# Patient Record
Sex: FEMALE | Race: BLACK OR AFRICAN AMERICAN | NOT HISPANIC OR LATINO | URBAN - METROPOLITAN AREA
[De-identification: names, ages, dates, MRNs, and addresses within clinical notes are randomized per-mention and may not be internally consistent; named-entity substitution may affect disease eponyms.]

---

## 2017-09-18 ENCOUNTER — EMERGENCY (EMERGENCY)
Facility: HOSPITAL | Age: 32
LOS: 1 days | Discharge: HOME | End: 2017-09-18
Attending: EMERGENCY MEDICINE | Admitting: EMERGENCY MEDICINE
Payer: COMMERCIAL

## 2017-09-18 VITALS
DIASTOLIC BLOOD PRESSURE: 82 MMHG | WEIGHT: 168.21 LBS | SYSTOLIC BLOOD PRESSURE: 131 MMHG | OXYGEN SATURATION: 98 % | HEART RATE: 99 BPM | TEMPERATURE: 98 F | RESPIRATION RATE: 18 BRPM

## 2017-09-18 VITALS
SYSTOLIC BLOOD PRESSURE: 113 MMHG | OXYGEN SATURATION: 100 % | RESPIRATION RATE: 18 BRPM | HEART RATE: 80 BPM | TEMPERATURE: 98 F | DIASTOLIC BLOOD PRESSURE: 77 MMHG

## 2017-09-18 DIAGNOSIS — J45.909 UNSPECIFIED ASTHMA, UNCOMPLICATED: ICD-10-CM

## 2017-09-18 DIAGNOSIS — R07.89 OTHER CHEST PAIN: ICD-10-CM

## 2017-09-18 PROCEDURE — 93005 ELECTROCARDIOGRAM TRACING: CPT

## 2017-09-18 PROCEDURE — 94640 AIRWAY INHALATION TREATMENT: CPT

## 2017-09-18 PROCEDURE — 71020: CPT | Mod: 26

## 2017-09-18 PROCEDURE — 99285 EMERGENCY DEPT VISIT HI MDM: CPT | Mod: 25

## 2017-09-18 PROCEDURE — 99284 EMERGENCY DEPT VISIT MOD MDM: CPT | Mod: 25

## 2017-09-18 PROCEDURE — 93010 ELECTROCARDIOGRAM REPORT: CPT

## 2017-09-18 PROCEDURE — 71046 X-RAY EXAM CHEST 2 VIEWS: CPT

## 2017-09-18 RX ORDER — IBUPROFEN 200 MG
600 TABLET ORAL ONCE
Qty: 0 | Refills: 0 | Status: COMPLETED | OUTPATIENT
Start: 2017-09-18 | End: 2017-09-18

## 2017-09-18 RX ORDER — IPRATROPIUM/ALBUTEROL SULFATE 18-103MCG
3 AEROSOL WITH ADAPTER (GRAM) INHALATION ONCE
Qty: 0 | Refills: 0 | Status: COMPLETED | OUTPATIENT
Start: 2017-09-18 | End: 2017-09-18

## 2017-09-18 RX ADMIN — Medication 3 MILLILITER(S): at 18:49

## 2017-09-18 RX ADMIN — Medication 600 MILLIGRAM(S): at 18:50

## 2017-09-18 RX ADMIN — Medication 3 MILLILITER(S): at 17:58

## 2017-09-18 NOTE — ED ADULT TRIAGE NOTE - CHIEF COMPLAINT QUOTE
c/o left sided chest discomfort radiating to back accompanied with sob x 3 weeks. Pt reports had cold like symptoms with productive cough with clear sputum. [facility] Asthma.

## 2017-09-18 NOTE — ED ADULT NURSE NOTE - FALLEN IN THE PAST
For vomiting, clear fluids.  Take small sips often.  Don't intake too much at one time.  When tolerating clears, advance to bland diet.  BRAT:bananas, rice, applesauce, toast.  When tolerating bland, advance slowly to regular diet.  Try to avoid milk products for a few days.  Lactose free milk if needed.  Avoid greasy and spicy foods.  May develop malabsorptive stools.  Ok to eat with diarrhea.  Just watch spice, grease, and milk.  Call if symptoms persists.  Take any meds (zofran, phenergan) if prescribed if needed    
no

## 2017-09-18 NOTE — ED PROVIDER NOTE - OBJECTIVE STATEMENT
31 yo female with PMH of asthma presents with left chest pain and shortness of breath for the past 3 weeks. Pt states that she recently had a cold. Pt states that the chest pain became worse today so she came into the ED. In addition, she states that she feels lumps in her breasts. Pt denies n/v/f.

## 2017-09-18 NOTE — ED ADULT NURSE NOTE - CHPI ED SYMPTOMS NEG
no chills/no edema/no wheezing/no cough/no shortness of breath/no fever/no headache/no hemoptysis/no diaphoresis

## 2017-09-18 NOTE — ED ADULT NURSE NOTE - OBJECTIVE STATEMENT
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  No use of inhaler prior to arrival.  Provider to evaluate.

## 2017-09-18 NOTE — ED PROVIDER NOTE - MEDICAL DECISION MAKING DETAILS
Vitals Stable. EKG normal. CXR normal. Wheezes heard on exam. Nebulizer treatment given. No risk factors for PE. Pt is safe for d/c

## 2017-09-18 NOTE — ED PROVIDER NOTE - ATTENDING CONTRIBUTION TO CARE
33yo F here w/ CP and SOB x3 weeks. started w/ uri type symptoms, but those have since resolved. Never had CP w/ it before. Hx of asthma, last use of neb a few days ago. +wheezing on exam. will check ekg, cxr, and try duoneb, and reassess. if ekg nl, do not think needs labs. 33yo F here w/ CP and SOB x3 weeks. started w/ uri type symptoms, but those have since resolved. Never had CP w/ it before. Hx of asthma, last use of neb a few days ago. +mild wheezing on exam. will check ekg, cxr, and try duoneb, and reassess. if ekg nl, do not think needs labs. no PE risk factors, is PERC neg.

## 2022-04-08 ENCOUNTER — OFFICE VISIT (OUTPATIENT)
Dept: OBSTETRICS AND GYNECOLOGY | Facility: CLINIC | Age: 37
End: 2022-04-08
Payer: COMMERCIAL

## 2022-04-08 VITALS — WEIGHT: 167.56 LBS | DIASTOLIC BLOOD PRESSURE: 76 MMHG | SYSTOLIC BLOOD PRESSURE: 104 MMHG

## 2022-04-08 DIAGNOSIS — Z12.4 ENCOUNTER FOR PAPANICOLAOU SMEAR FOR CERVICAL CANCER SCREENING: ICD-10-CM

## 2022-04-08 DIAGNOSIS — L29.2 VULVAR ITCHING: ICD-10-CM

## 2022-04-08 DIAGNOSIS — R35.0 URINARY FREQUENCY: ICD-10-CM

## 2022-04-08 DIAGNOSIS — Z01.419 WELL WOMAN EXAM WITH ROUTINE GYNECOLOGICAL EXAM: Primary | ICD-10-CM

## 2022-04-08 PROCEDURE — 99385 PREV VISIT NEW AGE 18-39: CPT | Mod: S$GLB,,, | Performed by: OBSTETRICS & GYNECOLOGY

## 2022-04-08 PROCEDURE — 87624 HPV HI-RISK TYP POOLED RSLT: CPT | Performed by: OBSTETRICS & GYNECOLOGY

## 2022-04-08 PROCEDURE — 87481 CANDIDA DNA AMP PROBE: CPT | Mod: 59 | Performed by: OBSTETRICS & GYNECOLOGY

## 2022-04-08 PROCEDURE — 87086 URINE CULTURE/COLONY COUNT: CPT | Performed by: OBSTETRICS & GYNECOLOGY

## 2022-04-08 PROCEDURE — 3074F SYST BP LT 130 MM HG: CPT | Mod: CPTII,S$GLB,, | Performed by: OBSTETRICS & GYNECOLOGY

## 2022-04-08 PROCEDURE — 3078F PR MOST RECENT DIASTOLIC BLOOD PRESSURE < 80 MM HG: ICD-10-PCS | Mod: CPTII,S$GLB,, | Performed by: OBSTETRICS & GYNECOLOGY

## 2022-04-08 PROCEDURE — 3074F PR MOST RECENT SYSTOLIC BLOOD PRESSURE < 130 MM HG: ICD-10-PCS | Mod: CPTII,S$GLB,, | Performed by: OBSTETRICS & GYNECOLOGY

## 2022-04-08 PROCEDURE — 99999 PR PBB SHADOW E&M-NEW PATIENT-LVL III: CPT | Mod: PBBFAC,,, | Performed by: OBSTETRICS & GYNECOLOGY

## 2022-04-08 PROCEDURE — 88175 CYTOPATH C/V AUTO FLUID REDO: CPT | Performed by: OBSTETRICS & GYNECOLOGY

## 2022-04-08 PROCEDURE — 1159F MED LIST DOCD IN RCRD: CPT | Mod: CPTII,S$GLB,, | Performed by: OBSTETRICS & GYNECOLOGY

## 2022-04-08 PROCEDURE — 99385 PR PREVENTIVE VISIT,NEW,18-39: ICD-10-PCS | Mod: S$GLB,,, | Performed by: OBSTETRICS & GYNECOLOGY

## 2022-04-08 PROCEDURE — 1159F PR MEDICATION LIST DOCUMENTED IN MEDICAL RECORD: ICD-10-PCS | Mod: CPTII,S$GLB,, | Performed by: OBSTETRICS & GYNECOLOGY

## 2022-04-08 PROCEDURE — 3078F DIAST BP <80 MM HG: CPT | Mod: CPTII,S$GLB,, | Performed by: OBSTETRICS & GYNECOLOGY

## 2022-04-08 PROCEDURE — 99999 PR PBB SHADOW E&M-NEW PATIENT-LVL III: ICD-10-PCS | Mod: PBBFAC,,, | Performed by: OBSTETRICS & GYNECOLOGY

## 2022-04-08 RX ORDER — NITROFURANTOIN 25; 75 MG/1; MG/1
100 CAPSULE ORAL 2 TIMES DAILY
Qty: 14 CAPSULE | Refills: 0 | Status: SHIPPED | OUTPATIENT
Start: 2022-04-08 | End: 2022-04-15

## 2022-04-08 NOTE — PROGRESS NOTES
SUBJECTIVE:   Daniela Dillon is a 36 y.o. female No obstetric history on file.  for annual well woman exam. Patient's last menstrual period was 2022..  She reported menses have always been monthly and lasting 7-10 days, this is normal for her  However in Feb she did not get a cycle, period resumed in 3/31/22  H/o endometriosis and was on ocp in the past    Also having urinary frequency/urgency, incomplete bladder emptying x 1 week.  + dysuria as well  Not sure if she has yeast infection or UTI   gets yeast infection frequently, she denies vaginal discharge    Contraception: pullout method      No past medical history on file.  Past Surgical History:   Procedure Laterality Date    DILATION AND CURETTAGE OF UTERUS       for polyp     Social History     Socioeconomic History    Marital status:    Tobacco Use    Smoking status: Never Smoker    Smokeless tobacco: Never Used   Substance and Sexual Activity    Alcohol use: Not Currently    Drug use: Never     No family history on file.  OB History    Para Term  AB Living   2         2   SAB IAB Ectopic Multiple Live Births           2      # Outcome Date GA Lbr Simon/2nd Weight Sex Delivery Anes PTL Lv   2             1                Obstetric Comments   Gynhx: reg/7-10 days (heavy)    endometriosis   H/o hysteroscopy D&C ( ? Polyp was found) in    Denies abnl pap   Denies std         No current outpatient medications on file.     No current facility-administered medications for this visit.     Allergies: Patient has no known allergies.       ROS:  GENERAL: Denies weight gain or weight loss. Feeling well overall.   SKIN: Denies rash or lesions.   HEAD: Denies head injury or headache.   NODES: Denies enlarged lymph nodes.   CHEST: Denies chest pain or shortness of breath.   CARDIOVASCULAR: Denies palpitations or left sided chest pain.   ABDOMEN: No abdominal pain, constipation, diarrhea, nausea, vomiting or rectal  bleeding.   URINARY: No frequency, dysuria, hematuria, or burning on urination.  REPRODUCTIVE: Denies vaginal discharge, abnormal vaginal bleeding, lesions, pelvic pain  BREASTS: The patient performs breast self-examination and denies pain, lumps, or nipple discharge.   HEMATOLOGIC: No easy bruisability or excessive bleeding.  MUSCULOSKELETAL: Denies joint pain or swelling.   NEUROLOGIC: Denies syncope or weakness.   PSYCHIATRIC: Denies depression, anxiety or mood swings.      OBJECTIVE:   /76   Wt 76 kg (167 lb 8.8 oz)   LMP 03/31/2022 Comment: no feb period  The patient appears well, alert, oriented x 3, in no distress.  PSYCH:  Normal, full range of affect  NECK: negative, no thyromegaly, trachea midline  SKIN: normal, good color, good turgor and no acne, striae, hirsutism  BREAST EXAM: breasts appear normal, no suspicious masses, no skin or nipple changes or axillary nodes  ABDOMEN: soft, non-tender; bowel sounds normal; no masses,  no organomegaly and no hernias, masses, or hepatosplenomegaly  GENITALIA: normal external genitalia, no erythema, no discharge  BLADDER: soft  URETHRA: normal appearing urethra with no masses, tenderness or lesions and normal urethra, normal urethral meatus  VAGINA: Normal  CERVIX: no lesions or cervical motion tenderness  UTERUS: normal size, contour, position, consistency, mobility, non-tender  ADNEXA: no mass, fullness, tenderness      ASSESSMENT:   1. Health maintenance  -pap done. Discussed ASCCP guideline screening every 3 - 5 years.   -counseled on exercise and healthy diet, weight loss  -bone health:  Discussed Vitamin D and Calcium supplementation, weight bearing exercises  2.  Discussed safety at home/school/work, healthy and balanced diet, sleep hygiene, as well as violence/weapons exposure.   3.  Monitor period.  4.  Contraception:  Pt declined, elected to continue with current method  5.  Affirm done, likely UTI - empiric treatment with macrobid, urine cx sent

## 2022-04-10 LAB — BACTERIA UR CULT: NORMAL

## 2022-04-13 LAB
BACTERIAL VAGINOSIS DNA: NEGATIVE
CANDIDA GLABRATA DNA: NEGATIVE
CANDIDA KRUSEI DNA: NEGATIVE
CANDIDA RRNA VAG QL PROBE: NEGATIVE
T VAGINALIS RRNA GENITAL QL PROBE: NEGATIVE

## 2022-04-14 LAB
FINAL PATHOLOGIC DIAGNOSIS: NORMAL
HPV HR 12 DNA SPEC QL NAA+PROBE: NEGATIVE
HPV16 AG SPEC QL: NEGATIVE
HPV18 DNA SPEC QL NAA+PROBE: NEGATIVE
Lab: NORMAL

## 2023-02-23 ENCOUNTER — OFFICE VISIT (OUTPATIENT)
Dept: OBSTETRICS AND GYNECOLOGY | Facility: CLINIC | Age: 38
End: 2023-02-23
Payer: COMMERCIAL

## 2023-02-23 ENCOUNTER — TELEPHONE (OUTPATIENT)
Dept: OBSTETRICS AND GYNECOLOGY | Facility: CLINIC | Age: 38
End: 2023-02-23
Payer: COMMERCIAL

## 2023-02-23 VITALS — WEIGHT: 181 LBS | DIASTOLIC BLOOD PRESSURE: 62 MMHG | SYSTOLIC BLOOD PRESSURE: 104 MMHG

## 2023-02-23 DIAGNOSIS — R30.0 DYSURIA: Primary | ICD-10-CM

## 2023-02-23 PROCEDURE — 99999 PR PBB SHADOW E&M-EST. PATIENT-LVL II: CPT | Mod: PBBFAC,,, | Performed by: PHYSICIAN ASSISTANT

## 2023-02-23 PROCEDURE — 3078F PR MOST RECENT DIASTOLIC BLOOD PRESSURE < 80 MM HG: ICD-10-PCS | Mod: CPTII,S$GLB,, | Performed by: PHYSICIAN ASSISTANT

## 2023-02-23 PROCEDURE — 1159F PR MEDICATION LIST DOCUMENTED IN MEDICAL RECORD: ICD-10-PCS | Mod: CPTII,S$GLB,, | Performed by: PHYSICIAN ASSISTANT

## 2023-02-23 PROCEDURE — 1159F MED LIST DOCD IN RCRD: CPT | Mod: CPTII,S$GLB,, | Performed by: PHYSICIAN ASSISTANT

## 2023-02-23 PROCEDURE — 3074F SYST BP LT 130 MM HG: CPT | Mod: CPTII,S$GLB,, | Performed by: PHYSICIAN ASSISTANT

## 2023-02-23 PROCEDURE — 3074F PR MOST RECENT SYSTOLIC BLOOD PRESSURE < 130 MM HG: ICD-10-PCS | Mod: CPTII,S$GLB,, | Performed by: PHYSICIAN ASSISTANT

## 2023-02-23 PROCEDURE — 99213 OFFICE O/P EST LOW 20 MIN: CPT | Mod: S$GLB,,, | Performed by: PHYSICIAN ASSISTANT

## 2023-02-23 PROCEDURE — 3078F DIAST BP <80 MM HG: CPT | Mod: CPTII,S$GLB,, | Performed by: PHYSICIAN ASSISTANT

## 2023-02-23 PROCEDURE — 99213 PR OFFICE/OUTPT VISIT, EST, LEVL III, 20-29 MIN: ICD-10-PCS | Mod: S$GLB,,, | Performed by: PHYSICIAN ASSISTANT

## 2023-02-23 PROCEDURE — 99999 PR PBB SHADOW E&M-EST. PATIENT-LVL II: ICD-10-PCS | Mod: PBBFAC,,, | Performed by: PHYSICIAN ASSISTANT

## 2023-02-23 PROCEDURE — 87086 URINE CULTURE/COLONY COUNT: CPT | Performed by: PHYSICIAN ASSISTANT

## 2023-02-23 RX ORDER — MONTELUKAST SODIUM 10 MG/1
10 TABLET ORAL NIGHTLY
COMMUNITY
Start: 2023-02-06

## 2023-02-23 RX ORDER — PHENAZOPYRIDINE HYDROCHLORIDE 100 MG/1
100 TABLET, FILM COATED ORAL 3 TIMES DAILY PRN
Qty: 6 TABLET | Refills: 0 | Status: SHIPPED | OUTPATIENT
Start: 2023-02-23 | End: 2023-02-25

## 2023-02-23 RX ORDER — ALBUTEROL SULFATE 90 UG/1
2 AEROSOL, METERED RESPIRATORY (INHALATION) EVERY 6 HOURS PRN
COMMUNITY
Start: 2023-01-22

## 2023-02-23 RX ORDER — FLUTICASONE PROPIONATE AND SALMETEROL XINAFOATE 115; 21 UG/1; UG/1
2 AEROSOL, METERED RESPIRATORY (INHALATION) 2 TIMES DAILY
COMMUNITY
Start: 2023-02-06

## 2023-02-23 RX ORDER — NITROFURANTOIN 25; 75 MG/1; MG/1
100 CAPSULE ORAL 2 TIMES DAILY
Qty: 10 CAPSULE | Refills: 0 | Status: SHIPPED | OUTPATIENT
Start: 2023-02-23 | End: 2023-02-28

## 2023-02-23 NOTE — TELEPHONE ENCOUNTER
----- Message from Wilder Carbajal MA sent at 2/23/2023 10:05 AM CST -----  Type: Patient Call Back    Who called:Self    What is the request in detail: pt. Is asking if she can drop off a sample.. she feels she may have a UTI..     Can the clinic reply by [facility]?No    Would the patient rather a call back or a response via [facility]? yes    Best call back number: [phone] (home)

## 2023-02-23 NOTE — PROGRESS NOTES
CC: Dysuria    HPI: Pt is a 37 y.o.  female who presents for evaluation of her UTI symptoms.   The patient presents today with dysuria, frequency, hematuria and urgency for the past 2 days. The patient denies flank pain, fever, nausea, vomiting. She denies frequent or recurrent UTIs - one last year.  Alleviating factors: cranberry juice, increased water intake    ROS:  GENERAL: Feeling well overall. Denies fever or chills.   SKIN: Denies rash or lesions.   HEAD: Denies head injury or headache.   NODES: Denies enlarged lymph nodes.   CHEST: Denies chest pain or shortness of breath.   CARDIOVASCULAR: Denies palpitations or left sided chest pain.   ABDOMEN: No abdominal pain, constipation, diarrhea, nausea, vomiting or rectal bleeding.   URINARY: + dysuria, hematuria, or burning on urination.  REPRODUCTIVE: See HPI.   BREASTS: Denies pain, lumps, or nipple discharge.   HEMATOLOGIC: No easy bruisability or excessive bleeding.   MUSCULOSKELETAL: Denies joint pain or swelling.   NEUROLOGIC: Denies syncope or weakness.   PSYCHIATRIC: Denies depression, anxiety or mood swings.    PE:   APPEARANCE: Well nourished, well developed, Black or  female in no acute distress.  PELVIS: Deferred  ABDOMEN: No suprapubic tenderness  MUSCULOSKELETAL: No CVA tenderness      Diagnosis:  1. Dysuria        Plan:     Orders Placed This Encounter    Urine culture    POCT urine dipstick without microscope    nitrofurantoin, macrocrystal-monohydrate, (MACROBID) 100 MG capsule    phenazopyridine (PYRIDIUM) 100 MG tablet       UTI Causes  Bladder infections are not contagious. You can't get one from someone else, from a toilet seat, or from sharing a bath.  The most common cause of bladder infections is bacteria from the bowels. The bacteria get onto the skin around the opening of the urethra. From there, they can get into the urine and travel up to the bladder, causing inflammation and infection. This usually happens because  of:  Wiping improperly after urinating. Always wipe from front to back.  Bowel incontinence  Pregnancy  Procedures such as having a catheter inserted  Older age  Not emptying your bladder. This can allow bacteria a chance to grow in your urine.  Dehydration  Constipation  Sex  Use of a diaphragm for birth control      UTI Care and prevention  These self-care steps can help prevent future infections:  Drink plenty of fluids to prevent dehydration and flush out your bladder. Do this unless you must restrict fluids for other health reasons, or your doctor told you not to.  Proper cleaning after going to the bathroom is important. Wipe from front to back after using the toilet to prevent the spread of bacteria.  Establish regular bladder habits. Urinate more often. Don't try to hold urine in for a long time.  Wear loose-fitting clothes and cotton underwear. Avoid tight-fitting pants.  Avoid vulvovaginal irritants  Improve your diet and prevent constipation. Eat more fresh fruit and vegetables, and fiber, and less junk and fatty foods.  Avoid sex until your symptoms are gone.  Increase fluid intake but avoid caffeine, alcohol, and spicy foods. These can irritate your bladder.  Drink water prior to intercourse and urinate afterwards and avoid certain positions which could increase likelyhood of UTIs,  If you use birth control pills and have frequent bladder infections, discuss it with your doctor.  Signs of pylonephritis: Go to the ED if develops fever, chills, n/v, back pain, worsening dyuria, hematuria  Pelvic rest until symptoms resolve    Bladder Irritants  Alcoholic beverages   Apples and apple juice  Cantaloupe    Carbonated beverages  Chili and spicy foods   Chocolate  Citrus fruit    Coffee (including decaffeinated)  Cranberries and cranberry juice Grapes  Guava     Milk Products: milk, cheese, cottage cheese, yogurt, ice cream  Peaches    Pineapple  Plums     Strawberries  Sugar especially artificial sweeteners,  saccharin, aspartame, corn sweeteners, honey, fructose, sucrose, lactose  Tea     Tomatoes and tomato juice  Vitamin B complex   Vinegar  *Most people are not sensitive to ALL of these products; your goal is to find the foods that make YOUR symptoms worse     Certain foods and drinks have been associated with worsening symptoms of urinary frequency, urgency, urge incontinence, or bladder pain. If you suffer from any of these conditions, you may wish to try eliminating one or more of these foods from your diet and see if your symptoms improve. If bladder symptoms are related to dietary factors, strict adherence to a diet that eliminates the food should bring marked relief in 10 days. Once you are feeling better, you can begin to add foods back into your diet, one at a time. If symptoms return, you will be able to identify the irritant. As you add foods back to your diet it is very important that you drink significant amounts of water. Low-acid fruit substitutions include apricots, papaya, pears and watermelon. Coffee drinkers can drink Kava or other lowacid instant drinks. Tea drinkers can substitute non-citrus herbal and sun brewed teas. Calcium carbonate co-buffered with calcium ascorbate can be substituted for Vitamin C. Prelief is a dietary supplement that works as an acid blocker for the bladder.       Follow-up PRN no resolution of symptoms.      Crystal Brambila PA-C

## 2023-02-25 LAB — BACTERIA UR CULT: NORMAL

## 2023-03-29 NOTE — PROGRESS NOTES
Subjective:       Patient ID: Daniela Dillon is a pleasant 37 y.o. Black or  female patient    Chief Complaint: Establish Care      Patient is a new pt to me and to our practice.    HPI    Pt with PMH of asthma and endometrial polyp coming today to establish care with a new PCP.  She was in [facility] for 19 years but is from [facility].  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  They have 2 kids, an 18 y old daughter who is in college ([place]) and an 11 y old son (Renee).  Reports feeling globally fine, she gained some weight when moving to [facility], does not move as much as in [facility].  She has no major problem to report.    Patient Active Problem List   Diagnosis    Asthma          ACTIVE MEDICAL ISSUES:  Documented in Problem List     PAST MEDICAL HISTORY  Documented     PAST SURGICAL HISTORY:  Documented     SOCIAL HISTORY:  Documented     FAMILY HISTORY:  Documented     ALLERGIES AND MEDICATIONS: updated and reviewed.  Documented    Review of Systems   Constitutional:  Positive for unexpected weight change.   HENT: Negative.     Respiratory: Negative.     Cardiovascular: Negative.    Gastrointestinal: Negative.    Genitourinary: Negative.    Musculoskeletal: Negative.    All other systems reviewed and are negative.    Objective:      Physical Exam  Vitals and nursing note reviewed.   Constitutional:       Appearance: Normal appearance.   HENT:      Right Ear: Tympanic membrane normal.      Left Ear: Tympanic membrane normal.   Eyes:      Conjunctiva/sclera: Conjunctivae normal.   Cardiovascular:      Rate and Rhythm: Normal rate and regular rhythm.      Pulses: Normal pulses.      Heart sounds: Normal heart sounds.   Pulmonary:      Effort: Pulmonary effort is normal.      Breath sounds: Normal breath sounds.   Abdominal:      General: Bowel sounds are normal.   Musculoskeletal:         General: Normal range of motion.   Skin:     General: Skin is warm and dry.   Neurological:       "General: No focal deficit present.      Mental Status: She is alert and oriented to person, place, and time.   Psychiatric:         Mood and Affect: Mood normal.         Behavior: Behavior normal.         Thought Content: Thought content normal.         Judgment: Judgment normal.       Vitals:    03/30/23 1345   BP: 118/72   BP Location: Right arm   Patient Position: Sitting   BP Method: Large (Manual)   Pulse: 80   Resp: 16   Temp: 98.2 °F (36.8 °C)   TempSrc: Oral   SpO2: 99%   Weight: 80.2 kg (176 lb 12.9 oz)   Height: 5' 5" (1.651 m)     Body mass index is 29.42 kg/m².    RESULTS: None to review.    Assessment:       1. Encounter for annual physical exam    2. Establishing care with new doctor, encounter for    3. Asthma, unspecified asthma severity, unspecified whether complicated, unspecified whether persistent    4. Need for Td vaccine        Plan:   Daniela was seen today for establish care.    Diagnoses and all orders for this visit:    Encounter for annual physical exam  -     CBC Auto Differential; Future  -     Comprehensive Metabolic Panel; Future  -     Hemoglobin A1C; Future  -     TSH; Future  -     Lipid Panel; Future  -     Hepatitis C Antibody; Future  -     HIV 1/2 Ag/Ab (4th Gen); Future    Will do usual blood work, discussed preventative measures, encouraged to take the COVID bivalent booster, discussed lifestyle.    Establishing care with new doctor, encounter for    Discussed the importance of a good pt-doctor trust relationship. Provided the pt with my contact.    Asthma, unspecified asthma severity, unspecified whether complicated, unspecified whether persistent    Not active at this point.    Need for Td vaccine  -     (In Office Administered) Td Vaccine - Preservative Free      No follow-ups on file.    This note was created by combination of typed  and M-Modal dictation.  Transcription errors may be present.  If there are any questions, please contact me.    "

## 2023-03-30 ENCOUNTER — OFFICE VISIT (OUTPATIENT)
Dept: FAMILY MEDICINE | Facility: CLINIC | Age: 38
End: 2023-03-30
Payer: COMMERCIAL

## 2023-03-30 VITALS
RESPIRATION RATE: 16 BRPM | DIASTOLIC BLOOD PRESSURE: 72 MMHG | BODY MASS INDEX: 29.46 KG/M2 | HEIGHT: 65 IN | SYSTOLIC BLOOD PRESSURE: 118 MMHG | OXYGEN SATURATION: 99 % | WEIGHT: 176.81 LBS | HEART RATE: 80 BPM | TEMPERATURE: 98 F

## 2023-03-30 DIAGNOSIS — Z00.00 ENCOUNTER FOR ANNUAL PHYSICAL EXAM: Primary | ICD-10-CM

## 2023-03-30 DIAGNOSIS — Z23 NEED FOR TD VACCINE: ICD-10-CM

## 2023-03-30 DIAGNOSIS — J45.909 ASTHMA, UNSPECIFIED ASTHMA SEVERITY, UNSPECIFIED WHETHER COMPLICATED, UNSPECIFIED WHETHER PERSISTENT: ICD-10-CM

## 2023-03-30 DIAGNOSIS — Z76.89 ESTABLISHING CARE WITH NEW DOCTOR, ENCOUNTER FOR: ICD-10-CM

## 2023-03-30 PROCEDURE — 3008F BODY MASS INDEX DOCD: CPT | Mod: CPTII,S$GLB,, | Performed by: INTERNAL MEDICINE

## 2023-03-30 PROCEDURE — 3008F PR BODY MASS INDEX (BMI) DOCUMENTED: ICD-10-PCS | Mod: CPTII,S$GLB,, | Performed by: INTERNAL MEDICINE

## 2023-03-30 PROCEDURE — 99999 PR PBB SHADOW E&M-EST. PATIENT-LVL IV: ICD-10-PCS | Mod: PBBFAC,,, | Performed by: INTERNAL MEDICINE

## 2023-03-30 PROCEDURE — 3074F PR MOST RECENT SYSTOLIC BLOOD PRESSURE < 130 MM HG: ICD-10-PCS | Mod: CPTII,S$GLB,, | Performed by: INTERNAL MEDICINE

## 2023-03-30 PROCEDURE — 1160F RVW MEDS BY RX/DR IN RCRD: CPT | Mod: CPTII,S$GLB,, | Performed by: INTERNAL MEDICINE

## 2023-03-30 PROCEDURE — 1160F PR REVIEW ALL MEDS BY PRESCRIBER/CLIN PHARMACIST DOCUMENTED: ICD-10-PCS | Mod: CPTII,S$GLB,, | Performed by: INTERNAL MEDICINE

## 2023-03-30 PROCEDURE — 90471 TD VACCINE GREATER THAN OR EQUAL TO 7YO PRESERVATIVE FREE IM: ICD-10-PCS | Mod: S$GLB,,, | Performed by: INTERNAL MEDICINE

## 2023-03-30 PROCEDURE — 90714 TD VACC NO PRESV 7 YRS+ IM: CPT | Mod: S$GLB,,, | Performed by: INTERNAL MEDICINE

## 2023-03-30 PROCEDURE — 1159F MED LIST DOCD IN RCRD: CPT | Mod: CPTII,S$GLB,, | Performed by: INTERNAL MEDICINE

## 2023-03-30 PROCEDURE — 90714 TD VACCINE GREATER THAN OR EQUAL TO 7YO PRESERVATIVE FREE IM: ICD-10-PCS | Mod: S$GLB,,, | Performed by: INTERNAL MEDICINE

## 2023-03-30 PROCEDURE — 99395 PR PREVENTIVE VISIT,EST,18-39: ICD-10-PCS | Mod: 25,S$GLB,, | Performed by: INTERNAL MEDICINE

## 2023-03-30 PROCEDURE — 90471 IMMUNIZATION ADMIN: CPT | Mod: S$GLB,,, | Performed by: INTERNAL MEDICINE

## 2023-03-30 PROCEDURE — 3074F SYST BP LT 130 MM HG: CPT | Mod: CPTII,S$GLB,, | Performed by: INTERNAL MEDICINE

## 2023-03-30 PROCEDURE — 3078F PR MOST RECENT DIASTOLIC BLOOD PRESSURE < 80 MM HG: ICD-10-PCS | Mod: CPTII,S$GLB,, | Performed by: INTERNAL MEDICINE

## 2023-03-30 PROCEDURE — 99999 PR PBB SHADOW E&M-EST. PATIENT-LVL IV: CPT | Mod: PBBFAC,,, | Performed by: INTERNAL MEDICINE

## 2023-03-30 PROCEDURE — 3078F DIAST BP <80 MM HG: CPT | Mod: CPTII,S$GLB,, | Performed by: INTERNAL MEDICINE

## 2023-03-30 PROCEDURE — 99395 PREV VISIT EST AGE 18-39: CPT | Mod: 25,S$GLB,, | Performed by: INTERNAL MEDICINE

## 2023-03-30 PROCEDURE — 1159F PR MEDICATION LIST DOCUMENTED IN MEDICAL RECORD: ICD-10-PCS | Mod: CPTII,S$GLB,, | Performed by: INTERNAL MEDICINE

## 2023-03-30 RX ORDER — FLUTICASONE PROPIONATE 50 MCG
1 SPRAY, SUSPENSION (ML) NASAL
COMMUNITY

## 2023-03-30 RX ORDER — CETIRIZINE HYDROCHLORIDE 10 MG/1
10 TABLET ORAL DAILY
COMMUNITY

## 2023-03-30 NOTE — PROGRESS NOTES
Health Maintenance Due   Topic     Hepatitis C Screening      Lipid Panel      HIV Screening      TETANUS VACCINE      COVID-19 Vaccine (3 - Booster)

## 2023-03-31 PROBLEM — J45.909 ASTHMA: Status: ACTIVE | Noted: 2023-03-31

## 2023-04-10 ENCOUNTER — LAB VISIT (OUTPATIENT)
Dept: LAB | Facility: HOSPITAL | Age: 38
End: 2023-04-10
Attending: INTERNAL MEDICINE
Payer: COMMERCIAL

## 2023-04-10 DIAGNOSIS — Z00.00 ENCOUNTER FOR ANNUAL PHYSICAL EXAM: ICD-10-CM

## 2023-04-10 LAB
ALBUMIN SERPL BCP-MCNC: 4.1 G/DL (ref 3.5–5.2)
ALP SERPL-CCNC: 67 U/L (ref 55–135)
ALT SERPL W/O P-5'-P-CCNC: 12 U/L (ref 10–44)
ANION GAP SERPL CALC-SCNC: 9 MMOL/L (ref 8–16)
AST SERPL-CCNC: 17 U/L (ref 10–40)
BASOPHILS # BLD AUTO: 0.02 K/UL (ref 0–0.2)
BASOPHILS NFR BLD: 0.5 % (ref 0–1.9)
BILIRUB SERPL-MCNC: 0.6 MG/DL (ref 0.1–1)
BUN SERPL-MCNC: 12 MG/DL (ref 6–20)
CALCIUM SERPL-MCNC: 9.3 MG/DL (ref 8.7–10.5)
CHLORIDE SERPL-SCNC: 106 MMOL/L (ref 95–110)
CHOLEST SERPL-MCNC: 157 MG/DL (ref 120–199)
CHOLEST/HDLC SERPL: 3.5 {RATIO} (ref 2–5)
CO2 SERPL-SCNC: 22 MMOL/L (ref 23–29)
CREAT SERPL-MCNC: 0.8 MG/DL (ref 0.5–1.4)
DIFFERENTIAL METHOD: ABNORMAL
EOSINOPHIL # BLD AUTO: 0.1 K/UL (ref 0–0.5)
EOSINOPHIL NFR BLD: 1.2 % (ref 0–8)
ERYTHROCYTE [DISTWIDTH] IN BLOOD BY AUTOMATED COUNT: 13 % (ref 11.5–14.5)
EST. GFR  (NO RACE VARIABLE): >60 ML/MIN/1.73 M^2
ESTIMATED AVG GLUCOSE: 114 MG/DL (ref 68–131)
GLUCOSE SERPL-MCNC: 90 MG/DL (ref 70–110)
HBA1C MFR BLD: 5.6 % (ref 4–5.6)
HCT VFR BLD AUTO: 37.7 % (ref 37–48.5)
HCV AB SERPL QL IA: NORMAL
HDLC SERPL-MCNC: 45 MG/DL (ref 40–75)
HDLC SERPL: 28.7 % (ref 20–50)
HGB BLD-MCNC: 11.9 G/DL (ref 12–16)
HIV 1+2 AB+HIV1 P24 AG SERPL QL IA: NORMAL
IMM GRANULOCYTES # BLD AUTO: 0.01 K/UL (ref 0–0.04)
IMM GRANULOCYTES NFR BLD AUTO: 0.2 % (ref 0–0.5)
LDLC SERPL CALC-MCNC: 99.4 MG/DL (ref 63–159)
LYMPHOCYTES # BLD AUTO: 1.4 K/UL (ref 1–4.8)
LYMPHOCYTES NFR BLD: 35.1 % (ref 18–48)
MCH RBC QN AUTO: 27.8 PG (ref 27–31)
MCHC RBC AUTO-ENTMCNC: 31.6 G/DL (ref 32–36)
MCV RBC AUTO: 88 FL (ref 82–98)
MONOCYTES # BLD AUTO: 0.4 K/UL (ref 0.3–1)
MONOCYTES NFR BLD: 9 % (ref 4–15)
NEUTROPHILS # BLD AUTO: 2.2 K/UL (ref 1.8–7.7)
NEUTROPHILS NFR BLD: 54 % (ref 38–73)
NONHDLC SERPL-MCNC: 112 MG/DL
NRBC BLD-RTO: 0 /100 WBC
PLATELET # BLD AUTO: 256 K/UL (ref 150–450)
PMV BLD AUTO: 12.1 FL (ref 9.2–12.9)
POTASSIUM SERPL-SCNC: 4.4 MMOL/L (ref 3.5–5.1)
PROT SERPL-MCNC: 6.9 G/DL (ref 6–8.4)
RBC # BLD AUTO: 4.28 M/UL (ref 4–5.4)
SODIUM SERPL-SCNC: 137 MMOL/L (ref 136–145)
TRIGL SERPL-MCNC: 63 MG/DL (ref 30–150)
TSH SERPL DL<=0.005 MIU/L-ACNC: 3.22 UIU/ML (ref 0.4–4)
WBC # BLD AUTO: 4.02 K/UL (ref 3.9–12.7)

## 2023-04-10 PROCEDURE — 80061 LIPID PANEL: CPT | Performed by: INTERNAL MEDICINE

## 2023-04-10 PROCEDURE — 84443 ASSAY THYROID STIM HORMONE: CPT | Performed by: INTERNAL MEDICINE

## 2023-04-10 PROCEDURE — 83036 HEMOGLOBIN GLYCOSYLATED A1C: CPT | Performed by: INTERNAL MEDICINE

## 2023-04-10 PROCEDURE — 87389 HIV-1 AG W/HIV-1&-2 AB AG IA: CPT | Performed by: INTERNAL MEDICINE

## 2023-04-10 PROCEDURE — 85025 COMPLETE CBC W/AUTO DIFF WBC: CPT | Performed by: INTERNAL MEDICINE

## 2023-04-10 PROCEDURE — 80053 COMPREHEN METABOLIC PANEL: CPT | Performed by: INTERNAL MEDICINE

## 2023-04-10 PROCEDURE — 36415 COLL VENOUS BLD VENIPUNCTURE: CPT | Mod: PO | Performed by: INTERNAL MEDICINE

## 2023-04-10 PROCEDURE — 86803 HEPATITIS C AB TEST: CPT | Performed by: INTERNAL MEDICINE

## 2023-04-20 ENCOUNTER — OFFICE VISIT (OUTPATIENT)
Dept: OBSTETRICS AND GYNECOLOGY | Facility: CLINIC | Age: 38
End: 2023-04-20
Payer: COMMERCIAL

## 2023-04-20 VITALS
SYSTOLIC BLOOD PRESSURE: 118 MMHG | WEIGHT: 179.38 LBS | DIASTOLIC BLOOD PRESSURE: 68 MMHG | BODY MASS INDEX: 29.84 KG/M2

## 2023-04-20 DIAGNOSIS — Z12.31 BREAST CANCER SCREENING BY MAMMOGRAM: ICD-10-CM

## 2023-04-20 DIAGNOSIS — Z01.419 ENCOUNTER FOR GYNECOLOGICAL EXAMINATION WITHOUT ABNORMAL FINDING: Primary | ICD-10-CM

## 2023-04-20 PROCEDURE — 3078F DIAST BP <80 MM HG: CPT | Mod: CPTII,S$GLB,,

## 2023-04-20 PROCEDURE — 99395 PREV VISIT EST AGE 18-39: CPT | Mod: S$GLB,,,

## 2023-04-20 PROCEDURE — 99999 PR PBB SHADOW E&M-EST. PATIENT-LVL III: ICD-10-PCS | Mod: PBBFAC,,,

## 2023-04-20 PROCEDURE — 99999 PR PBB SHADOW E&M-EST. PATIENT-LVL III: CPT | Mod: PBBFAC,,,

## 2023-04-20 PROCEDURE — 1159F MED LIST DOCD IN RCRD: CPT | Mod: CPTII,S$GLB,,

## 2023-04-20 PROCEDURE — 99395 PR PREVENTIVE VISIT,EST,18-39: ICD-10-PCS | Mod: S$GLB,,,

## 2023-04-20 PROCEDURE — 3074F SYST BP LT 130 MM HG: CPT | Mod: CPTII,S$GLB,,

## 2023-04-20 PROCEDURE — 1159F PR MEDICATION LIST DOCUMENTED IN MEDICAL RECORD: ICD-10-PCS | Mod: CPTII,S$GLB,,

## 2023-04-20 PROCEDURE — 3044F PR MOST RECENT HEMOGLOBIN A1C LEVEL <7.0%: ICD-10-PCS | Mod: CPTII,S$GLB,,

## 2023-04-20 PROCEDURE — 3008F PR BODY MASS INDEX (BMI) DOCUMENTED: ICD-10-PCS | Mod: CPTII,S$GLB,,

## 2023-04-20 PROCEDURE — 3044F HG A1C LEVEL LT 7.0%: CPT | Mod: CPTII,S$GLB,,

## 2023-04-20 PROCEDURE — 3078F PR MOST RECENT DIASTOLIC BLOOD PRESSURE < 80 MM HG: ICD-10-PCS | Mod: CPTII,S$GLB,,

## 2023-04-20 PROCEDURE — 3074F PR MOST RECENT SYSTOLIC BLOOD PRESSURE < 130 MM HG: ICD-10-PCS | Mod: CPTII,S$GLB,,

## 2023-04-20 PROCEDURE — 3008F BODY MASS INDEX DOCD: CPT | Mod: CPTII,S$GLB,,

## 2023-04-20 NOTE — PROGRESS NOTES
Subjective:      Patient ID: Daniela Dillon is a 37 y.o. female.    Chief Complaint:  Well Woman      History of Present Illness  HPI  Annual Exam-Premenopausal  Patient presents for annual exam. The patient has no complaints today. She does report same problems with her cycle, heavy and prolonged (lasting 7 days). She is not taking OCP. Birth control: coitus interruptus.    The patient is sexually active. GYN screening history: last pap: approximate date 4/18/2022 and was normal and last mammogram: patient has never had a mammogram. The patient wears seatbelts: yes. The patient participates in regular exercise: yes. Has the patient ever been transfused or tattooed?: not asked. The patient reports that there is not domestic violence in her life.    Denies STD screening    SCREENING:   Pap: 4/8/2022  Covid Vaccine Status:  needs booster  Mammogram: N/A   TC:   Colonoscopy: N/A   DEXA:  N/A     GYN FH:  Breast cancer: none  Colon cancer: none  Ovarian cancer: none  Endometrial cancer: none    Last 4/2022 (AM)  Daniela Dillon is a 36 y.o. female No obstetric history on file.  for annual well woman exam.   Patient's last menstrual period was 03/31/2022..  She reported menses have always been monthly   and lasting 7-10 days, this is normal for her  However in Feb she did not get a cycle, period resumed in 3/31/22  H/o endometriosis and was on ocp in the past   Also having urinary frequency/urgency, incomplete bladder emptying x 1 week.  + dysuria as well  Not sure if she has yeast infection or UTI   gets yeast infection frequently, she denies vaginal discharge   Contraception: pullout method        Past Medical History:  Past Medical History:   Diagnosis Date    Asthma        Past Surgical History:  Past Surgical History:   Procedure Laterality Date    DILATION AND CURETTAGE OF UTERUS      2021 for polyp       Family History:  Family History   Problem Relation Age of Onset    Hodgkin's lymphoma Mother     Stroke  Father     Hypertension Father     Drug abuse Father     Alcohol abuse Father     Endocrine tumor Sister     Lung cancer Paternal Grandmother         smoker    Fibroids Daughter     No Known Problems Son        Allergies:  Review of patient's allergies indicates:  No Known Allergies    Medications:  Current Outpatient Medications on File Prior to Visit   Medication Sig Dispense Refill    ADVAIR -21 mcg/actuation HFAA inhaler 2 puffs 2 (two) times daily.      albuterol (PROVENTIL/VENTOLIN HFA) 90 mcg/actuation inhaler 2 puffs every 6 (six) hours as needed.      cetirizine (ZYRTEC) 10 MG tablet Take 10 mg by mouth once daily.      fluticasone propionate (FLONASE) 50 mcg/actuation nasal spray 1 spray by Nasal route.      montelukast (SINGULAIR) 10 mg tablet Take 10 mg by mouth every evening.       No current facility-administered medications on file prior to visit.       Social History:  Social History     Tobacco Use    Smoking status: Never    Smokeless tobacco: Never   Substance Use Topics    Alcohol use: Yes     Comment: social    Drug use: Yes     Types: Marijuana            GYN & OB History  Patient's last menstrual period was 04/10/2023 (exact date).   Date of Last Pap: 2022    OB History    Para Term  AB Living   2 2 2 0 0 2   SAB IAB Ectopic Multiple Live Births           2      # Outcome Date GA Lbr Simon/2nd Weight Sex Delivery Anes PTL Lv   2 Term            1 Term               Obstetric Comments   Gynhx: reg/7-10 days (heavy)    endometriosis   H/o hysteroscopy D&C ( ? Polyp was found) in    Denies abnl pap   Denies std     Past Medical History:  Past Medical History:   Diagnosis Date    Asthma        Past Surgical History:  Past Surgical History:   Procedure Laterality Date    DILATION AND CURETTAGE OF UTERUS       for polyp       Family History:  Family History   Problem Relation Age of Onset    Hodgkin's lymphoma Mother     Stroke Father     Hypertension Father     Drug  abuse Father     Alcohol abuse Father     Endocrine tumor Sister     Lung cancer Paternal Grandmother         smoker    Fibroids Daughter     No Known Problems Son        Allergies:  Review of patient's allergies indicates:  No Known Allergies    Medications:  Current Outpatient Medications on File Prior to Visit   Medication Sig Dispense Refill    ADVAIR -21 mcg/actuation HFAA inhaler 2 puffs 2 (two) times daily.      albuterol (PROVENTIL/VENTOLIN HFA) 90 mcg/actuation inhaler 2 puffs every 6 (six) hours as needed.      cetirizine (ZYRTEC) 10 MG tablet Take 10 mg by mouth once daily.      fluticasone propionate (FLONASE) 50 mcg/actuation nasal spray 1 spray by Nasal route.      montelukast (SINGULAIR) 10 mg tablet Take 10 mg by mouth every evening.       No current facility-administered medications on file prior to visit.       Social History:  Social History     Tobacco Use    Smoking status: Never    Smokeless tobacco: Never   Substance Use Topics    Alcohol use: Yes     Comment: social    Drug use: Yes     Types: Marijuana        Review of Systems  Review of Systems   Constitutional:  Negative for activity change and appetite change.   Respiratory:  Negative for shortness of breath.    Cardiovascular:  Negative for chest pain.   Gastrointestinal:  Negative for abdominal pain, diarrhea and nausea.   Genitourinary:  Positive for menorrhagia. Negative for bladder incontinence, decreased libido, dysmenorrhea, dyspareunia, dysuria, flank pain, frequency, genital sores, hematuria, hot flashes, menstrual problem, pelvic pain, urgency, vaginal bleeding, vaginal discharge, vaginal pain, urinary incontinence, postcoital bleeding, postmenopausal bleeding, vaginal dryness and vaginal odor.   Integumentary:  Negative for breast tenderness.   Neurological:  Negative for headaches.   Breast: Negative for breast self exam and tenderness       Objective:     Physical Exam:   Constitutional: She is oriented to person,  place, and time. She appears well-developed and well-nourished.    HENT:   Head: Normocephalic.      Cardiovascular:       Exam reveals no clubbing.        Pulmonary/Chest: Effort normal and breath sounds normal. Right breast exhibits no nipple discharge, no skin change, no tenderness, no bleeding and no swelling. Left breast exhibits no nipple discharge, no skin change, no tenderness, no bleeding and no swelling. Breasts are symmetrical.        Abdominal: Soft. There is no abdominal tenderness. Hernia confirmed negative in the right inguinal area and confirmed negative in the left inguinal area.     Genitourinary:    Inguinal canal, vagina, uterus, right adnexa, left adnexa and rectum normal.   Rectum:      No tenderness.      Pelvic exam was performed with patient supine.   The external female genitalia was normal.   No external genitalia lesions identified,Genitalia hair distrobution normal .   Labial bartholins normal.Cervix is normal. No  no vaginal discharge or tenderness in the vagina.    No signs of injury in the vagina.   Vagina was moist.      Cervix exhibits polyp. Cervix exhibits no discharge and no tenderness. Uterus is not tender. Normal urethral meatus.Urethra findings: no tendernessBladder findings: no bladder tenderness          Musculoskeletal: Normal range of motion and moves all extremeties.      Lymphadenopathy: No inguinal adenopathy noted on the right or left side.    Neurological: She is alert and oriented to person, place, and time.    Skin: Skin is warm. Nails show no clubbing.    Psychiatric: She has a normal mood and affect. Her behavior is normal. Judgment and thought content normal.       Assessment:     1. Encounter for gynecological examination without abnormal finding    2. Breast cancer screening by mammogram              Plan:     1. Encounter for gynecological examination without abnormal finding  - Pap due in 2 years.  -   Screening tests as ordered.  - Diet and exercise  encouraged.    Counseling: injury prevention: Driving under the influence of alcohol  Weapons  Seatbelts  Adequate sleep  Exercise  Stress management techniques  reviewed current Pap guidelines. Explained new understanding of natural history of cervical disease and improved Paps. Recommended guideline concordant care.    2. Breast cancer screening by mammogram  - Mammo Digital Screening Bilat w/ Dennis; Future (after her birthday)         Follow up next year for annual exam or sooner if needed

## 2023-09-27 ENCOUNTER — TELEPHONE (OUTPATIENT)
Dept: OBSTETRICS AND GYNECOLOGY | Facility: CLINIC | Age: 38
End: 2023-09-27
Payer: COMMERCIAL

## 2023-09-27 NOTE — TELEPHONE ENCOUNTER
"----- Message from Fazal Alba sent at 9/27/2023 12:01 PM CDT -----  Regarding: Self  [phone]  Type: RX Refill Request    Who Called:  Self     Have you contacted your pharmacy: no    Refill    RX Name and Strength:  "Diflucan" do not see it on chart, but the pt said the doctor prescribed it before.     Preferred Pharmacy with phone number:   [facility] [address] [place] [address]  Phone: [phone] Fax: [phone]     Local or Mail Order: local     Would the patient rather a call back or a response via My [facility]? Call back     Best Call Back Number: [phone]     Additional Information:     Thank you.       "

## 2023-09-27 NOTE — TELEPHONE ENCOUNTER
Pt call was returned. Pt was informed that she would need to come into the office to get tested to make sure she is giving the correct medications. Pt stated she never had no one tell her this information before, she will have to look for another provider. I went spoke with Dr. Slaughter and pt have not seen her in some time.

## 2023-10-16 ENCOUNTER — OFFICE VISIT (OUTPATIENT)
Dept: OBSTETRICS AND GYNECOLOGY | Facility: CLINIC | Age: 38
End: 2023-10-16
Payer: COMMERCIAL

## 2023-10-16 VITALS — SYSTOLIC BLOOD PRESSURE: 112 MMHG | DIASTOLIC BLOOD PRESSURE: 64 MMHG | WEIGHT: 177.5 LBS | BODY MASS INDEX: 29.53 KG/M2

## 2023-10-16 DIAGNOSIS — N30.01 HEMATURIA DUE TO ACUTE CYSTITIS: Primary | ICD-10-CM

## 2023-10-16 LAB
BILIRUB SERPL-MCNC: NEGATIVE MG/DL
BLOOD URINE, POC: 250
CLARITY, POC UA: NORMAL
COLOR, POC UA: NORMAL
GLUCOSE UR QL STRIP: NORMAL
KETONES UR QL STRIP: NEGATIVE
LEUKOCYTE ESTERASE URINE, POC: 2
NITRITE, POC UA: 0
PH, POC UA: 7
PROTEIN, POC: 2
SPECIFIC GRAVITY, POC UA: 1.01
UROBILINOGEN, POC UA: NORMAL

## 2023-10-16 PROCEDURE — 87086 URINE CULTURE/COLONY COUNT: CPT | Performed by: OBSTETRICS & GYNECOLOGY

## 2023-10-16 PROCEDURE — 3078F DIAST BP <80 MM HG: CPT | Mod: CPTII,S$GLB,, | Performed by: OBSTETRICS & GYNECOLOGY

## 2023-10-16 PROCEDURE — 3074F SYST BP LT 130 MM HG: CPT | Mod: CPTII,S$GLB,, | Performed by: OBSTETRICS & GYNECOLOGY

## 2023-10-16 PROCEDURE — 81002 POCT URINE DIPSTICK WITHOUT MICROSCOPE: ICD-10-PCS | Mod: S$GLB,,, | Performed by: OBSTETRICS & GYNECOLOGY

## 2023-10-16 PROCEDURE — 1159F PR MEDICATION LIST DOCUMENTED IN MEDICAL RECORD: ICD-10-PCS | Mod: CPTII,S$GLB,, | Performed by: OBSTETRICS & GYNECOLOGY

## 2023-10-16 PROCEDURE — 3074F PR MOST RECENT SYSTOLIC BLOOD PRESSURE < 130 MM HG: ICD-10-PCS | Mod: CPTII,S$GLB,, | Performed by: OBSTETRICS & GYNECOLOGY

## 2023-10-16 PROCEDURE — 3044F PR MOST RECENT HEMOGLOBIN A1C LEVEL <7.0%: ICD-10-PCS | Mod: CPTII,S$GLB,, | Performed by: OBSTETRICS & GYNECOLOGY

## 2023-10-16 PROCEDURE — 99999 PR PBB SHADOW E&M-EST. PATIENT-LVL III: ICD-10-PCS | Mod: PBBFAC,,, | Performed by: OBSTETRICS & GYNECOLOGY

## 2023-10-16 PROCEDURE — 3078F PR MOST RECENT DIASTOLIC BLOOD PRESSURE < 80 MM HG: ICD-10-PCS | Mod: CPTII,S$GLB,, | Performed by: OBSTETRICS & GYNECOLOGY

## 2023-10-16 PROCEDURE — 3008F PR BODY MASS INDEX (BMI) DOCUMENTED: ICD-10-PCS | Mod: CPTII,S$GLB,, | Performed by: OBSTETRICS & GYNECOLOGY

## 2023-10-16 PROCEDURE — 81002 URINALYSIS NONAUTO W/O SCOPE: CPT | Mod: S$GLB,,, | Performed by: OBSTETRICS & GYNECOLOGY

## 2023-10-16 PROCEDURE — 3044F HG A1C LEVEL LT 7.0%: CPT | Mod: CPTII,S$GLB,, | Performed by: OBSTETRICS & GYNECOLOGY

## 2023-10-16 PROCEDURE — 99213 PR OFFICE/OUTPT VISIT, EST, LEVL III, 20-29 MIN: ICD-10-PCS | Mod: S$GLB,,, | Performed by: OBSTETRICS & GYNECOLOGY

## 2023-10-16 PROCEDURE — 1159F MED LIST DOCD IN RCRD: CPT | Mod: CPTII,S$GLB,, | Performed by: OBSTETRICS & GYNECOLOGY

## 2023-10-16 PROCEDURE — 99213 OFFICE O/P EST LOW 20 MIN: CPT | Mod: S$GLB,,, | Performed by: OBSTETRICS & GYNECOLOGY

## 2023-10-16 PROCEDURE — 3008F BODY MASS INDEX DOCD: CPT | Mod: CPTII,S$GLB,, | Performed by: OBSTETRICS & GYNECOLOGY

## 2023-10-16 PROCEDURE — 99999 PR PBB SHADOW E&M-EST. PATIENT-LVL III: CPT | Mod: PBBFAC,,, | Performed by: OBSTETRICS & GYNECOLOGY

## 2023-10-16 RX ORDER — FLUCONAZOLE 150 MG/1
150 TABLET ORAL DAILY
Qty: 1 TABLET | Refills: 0 | Status: SHIPPED | OUTPATIENT
Start: 2023-10-16 | End: 2023-10-17

## 2023-10-16 RX ORDER — CIPROFLOXACIN 500 MG/1
500 TABLET ORAL 2 TIMES DAILY
Qty: 6 TABLET | Refills: 0 | Status: SHIPPED | OUTPATIENT
Start: 2023-10-16 | End: 2023-10-19

## 2023-10-16 NOTE — LETTER
October 16, 2023    Daniela Dillon  [address]  [place] [address]         [facility] [place] - [facility] GYN  [address]  [place] [address]  Phone: [phone] October 16, 2023     Patient: Daniela iDllon   YOB: 1985   Date of Visit: 10/16/2023       To Whom It May Concern:    It is my medical opinion that Daniela Dillon may return to work on 10/18/2023 . Ms. Dillon will need to take more breaks for the restroom.     If you have any questions or concerns, please don't hesitate to call.    Sincerely,        Tigre Alcala MD

## 2023-10-18 LAB — BACTERIA UR CULT: NORMAL

## 2023-10-23 ENCOUNTER — OFFICE VISIT (OUTPATIENT)
Dept: OBSTETRICS AND GYNECOLOGY | Facility: CLINIC | Age: 38
End: 2023-10-23
Payer: COMMERCIAL

## 2023-10-23 VITALS
WEIGHT: 176.38 LBS | SYSTOLIC BLOOD PRESSURE: 110 MMHG | DIASTOLIC BLOOD PRESSURE: 70 MMHG | BODY MASS INDEX: 29.35 KG/M2

## 2023-10-23 DIAGNOSIS — N93.9 ABNORMAL UTERINE BLEEDING (AUB): Primary | ICD-10-CM

## 2023-10-23 PROCEDURE — 99213 OFFICE O/P EST LOW 20 MIN: CPT | Mod: S$GLB,,, | Performed by: OBSTETRICS & GYNECOLOGY

## 2023-10-23 PROCEDURE — 99999 PR PBB SHADOW E&M-EST. PATIENT-LVL III: CPT | Mod: PBBFAC,,, | Performed by: OBSTETRICS & GYNECOLOGY

## 2023-10-23 PROCEDURE — 3078F DIAST BP <80 MM HG: CPT | Mod: CPTII,S$GLB,, | Performed by: OBSTETRICS & GYNECOLOGY

## 2023-10-23 PROCEDURE — 3044F HG A1C LEVEL LT 7.0%: CPT | Mod: CPTII,S$GLB,, | Performed by: OBSTETRICS & GYNECOLOGY

## 2023-10-23 PROCEDURE — 1159F PR MEDICATION LIST DOCUMENTED IN MEDICAL RECORD: ICD-10-PCS | Mod: CPTII,S$GLB,, | Performed by: OBSTETRICS & GYNECOLOGY

## 2023-10-23 PROCEDURE — 3074F SYST BP LT 130 MM HG: CPT | Mod: CPTII,S$GLB,, | Performed by: OBSTETRICS & GYNECOLOGY

## 2023-10-23 PROCEDURE — 1159F MED LIST DOCD IN RCRD: CPT | Mod: CPTII,S$GLB,, | Performed by: OBSTETRICS & GYNECOLOGY

## 2023-10-23 PROCEDURE — 3074F PR MOST RECENT SYSTOLIC BLOOD PRESSURE < 130 MM HG: ICD-10-PCS | Mod: CPTII,S$GLB,, | Performed by: OBSTETRICS & GYNECOLOGY

## 2023-10-23 PROCEDURE — 3008F PR BODY MASS INDEX (BMI) DOCUMENTED: ICD-10-PCS | Mod: CPTII,S$GLB,, | Performed by: OBSTETRICS & GYNECOLOGY

## 2023-10-23 PROCEDURE — 3008F BODY MASS INDEX DOCD: CPT | Mod: CPTII,S$GLB,, | Performed by: OBSTETRICS & GYNECOLOGY

## 2023-10-23 PROCEDURE — 99999 PR PBB SHADOW E&M-EST. PATIENT-LVL III: ICD-10-PCS | Mod: PBBFAC,,, | Performed by: OBSTETRICS & GYNECOLOGY

## 2023-10-23 PROCEDURE — 3078F PR MOST RECENT DIASTOLIC BLOOD PRESSURE < 80 MM HG: ICD-10-PCS | Mod: CPTII,S$GLB,, | Performed by: OBSTETRICS & GYNECOLOGY

## 2023-10-23 PROCEDURE — 99213 PR OFFICE/OUTPT VISIT, EST, LEVL III, 20-29 MIN: ICD-10-PCS | Mod: S$GLB,,, | Performed by: OBSTETRICS & GYNECOLOGY

## 2023-10-23 PROCEDURE — 3044F PR MOST RECENT HEMOGLOBIN A1C LEVEL <7.0%: ICD-10-PCS | Mod: CPTII,S$GLB,, | Performed by: OBSTETRICS & GYNECOLOGY

## 2023-10-23 NOTE — LETTER
October 23, 2023      [facility] - OB GYN  [address]  [place] [address]  Phone: [phone]       Patient: Daniela Dillon   YOB: 1985  Date of Visit: 10/23/2023    To Whom It May Concern:    Cris Dillon  was at [facility] on 10/23/2023. The patient may return to work on 10/23/2023 with no restrictions.She also has a appointment on 10/25/23 for 2:30pm. If you have any questions or concerns, or if I can be of further assistance, please do not hesitate to contact me.    Sincerely,    Carla Killian MA

## 2023-10-23 NOTE — PROGRESS NOTES
SUBJECTIVE:   38 y.o. female   for endometrial ablation.     Patient's last menstrual period was 10/02/2023 (exact date)..        Pt with long standing AUB-HMB, endometriosis. 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 Polyp was found) in    Denies abnl pap   Denies std     Past Medical History:   Diagnosis Date    Asthma      Past Surgical History:   Procedure Laterality Date    DILATION AND CURETTAGE OF UTERUS       for polyp     Social History     Socioeconomic History    Marital status:    Tobacco Use    Smoking status: Never    Smokeless tobacco: Never   Substance and Sexual Activity    Alcohol use: Yes     Comment: social    Drug use: Yes     Types: Marijuana    Sexual activity: Yes     Partners: Male     Birth control/protection: None   Social History Narrative     since     She is teacher at [employer].     Family History   Problem Relation Age of Onset    Hodgkin's lymphoma Mother     Stroke Father     Hypertension Father     Drug abuse Father     Alcohol abuse Father     Endocrine tumor Sister     Lung cancer Paternal Grandmother         smoker    Fibroids Daughter     No Known Problems Son          Current Outpatient Medications   Medication Sig Dispense Refill     ADVAIR -21 mcg/actuation HFAA inhaler 2 puffs 2 (two) times daily.      albuterol (PROVENTIL/VENTOLIN HFA) 90 mcg/actuation inhaler 2 puffs every 6 (six) hours as needed.      cetirizine (ZYRTEC) 10 MG tablet Take 10 mg by mouth once daily.      fluticasone propionate (FLONASE) 50 mcg/actuation nasal spray 1 spray by Nasal route.      montelukast (SINGULAIR) 10 mg tablet Take 10 mg by mouth every evening.       No current facility-administered medications for this visit.     Allergies: Patient has no known allergies.       ROS  ROS:  GENERAL: Denies weight gain or weight loss. Feeling well overall.   SKIN: Denies rash or lesions.   HEAD: Denies head injury or headache.   NODES: Denies enlarged lymph nodes.   CHEST: Denies chest pain or shortness of breath.   CARDIOVASCULAR: Denies palpitations or left sided chest pain.   ABDOMEN: No abdominal pain, constipation, diarrhea, nausea, vomiting or rectal bleeding.   URINARY: No frequency, dysuria, hematuria, or burning on urination.  REPRODUCTIVE: Denies vaginal discharge, abnormal vaginal bleeding, lesions, pelvic pain  BREASTS: The patient performs breast self-examination and denies pain, lumps, or nipple discharge.   HEMATOLOGIC: No easy bruisability or excessive bleeding.  MUSCULOSKELETAL: Denies joint pain or swelling.   NEUROLOGIC: Denies syncope or weakness.   PSYCHIATRIC: Denies depression, anxiety or mood swings.      OBJECTIVE:   /70   Wt 80 kg (176 lb 5.9 oz)   LMP 10/02/2023 (Exact Date)   BMI 29.35 kg/m²   The patient appears well, alert, oriented x 3, in no distress.    Counseling appt       ASSESSMENT:   .Diagnoses and all orders for this visit:    Abnormal uterine bleeding (AUB)  -     US Pelvis Comp with Transvag NON-OB (xpd; Future  -     Case Request Operating Room: ABLATION, ENDOMETRIUM, THERMAL, HYSTEROSCOPIC, SALPINGECTOMY, LAPAROSCOPIC        -      Endometrial ablation with novasure and BTL scheduled for 11/14/23    RTC for EMB  this week, preop as well

## 2023-10-24 ENCOUNTER — ANESTHESIA EVENT (OUTPATIENT)
Dept: SURGERY | Facility: HOSPITAL | Age: 38
End: 2023-10-24
Payer: COMMERCIAL

## 2023-10-25 ENCOUNTER — HOSPITAL ENCOUNTER (OUTPATIENT)
Dept: RADIOLOGY | Facility: HOSPITAL | Age: 38
Discharge: HOME | End: 2023-10-25
Attending: OBSTETRICS & GYNECOLOGY
Payer: COMMERCIAL

## 2023-10-25 ENCOUNTER — HOSPITAL ENCOUNTER (OUTPATIENT)
Dept: PREADMISSION TESTING | Facility: HOSPITAL | Age: 38
Discharge: HOME | End: 2023-10-25
Attending: OBSTETRICS & GYNECOLOGY
Payer: COMMERCIAL

## 2023-10-25 ENCOUNTER — PROCEDURE VISIT (OUTPATIENT)
Dept: OBSTETRICS AND GYNECOLOGY | Facility: CLINIC | Age: 38
End: 2023-10-25
Payer: COMMERCIAL

## 2023-10-25 VITALS — SYSTOLIC BLOOD PRESSURE: 110 MMHG | BODY MASS INDEX: 29.29 KG/M2 | DIASTOLIC BLOOD PRESSURE: 70 MMHG | WEIGHT: 176 LBS

## 2023-10-25 DIAGNOSIS — Z01.818 PREOP EXAMINATION: ICD-10-CM

## 2023-10-25 DIAGNOSIS — N93.9 ABNORMAL UTERINE BLEEDING (AUB): ICD-10-CM

## 2023-10-25 DIAGNOSIS — Z01.818 PREOP TESTING: Primary | ICD-10-CM

## 2023-10-25 LAB
ANION GAP SERPL CALC-SCNC: 8 MMOL/L (ref 8–16)
BASOPHILS # BLD AUTO: 0.03 K/UL (ref 0–0.2)
BASOPHILS NFR BLD: 0.5 % (ref 0–1.9)
BUN SERPL-MCNC: 10 MG/DL (ref 6–20)
CALCIUM SERPL-MCNC: 8.9 MG/DL (ref 8.7–10.5)
CHLORIDE SERPL-SCNC: 108 MMOL/L (ref 95–110)
CO2 SERPL-SCNC: 22 MMOL/L (ref 23–29)
CREAT SERPL-MCNC: 0.9 MG/DL (ref 0.5–1.4)
DIFFERENTIAL METHOD: ABNORMAL
EOSINOPHIL # BLD AUTO: 0.1 K/UL (ref 0–0.5)
EOSINOPHIL NFR BLD: 1.1 % (ref 0–8)
ERYTHROCYTE [DISTWIDTH] IN BLOOD BY AUTOMATED COUNT: 13.8 % (ref 11.5–14.5)
EST. GFR  (NO RACE VARIABLE): >60 ML/MIN/1.73 M^2
GLUCOSE SERPL-MCNC: 88 MG/DL (ref 70–110)
HCT VFR BLD AUTO: 34.3 % (ref 37–48.5)
HGB BLD-MCNC: 11 G/DL (ref 12–16)
IMM GRANULOCYTES # BLD AUTO: 0.01 K/UL (ref 0–0.04)
IMM GRANULOCYTES NFR BLD AUTO: 0.2 % (ref 0–0.5)
LYMPHOCYTES # BLD AUTO: 1.9 K/UL (ref 1–4.8)
LYMPHOCYTES NFR BLD: 30.6 % (ref 18–48)
MCH RBC QN AUTO: 27 PG (ref 27–31)
MCHC RBC AUTO-ENTMCNC: 32.1 G/DL (ref 32–36)
MCV RBC AUTO: 84 FL (ref 82–98)
MONOCYTES # BLD AUTO: 0.5 K/UL (ref 0.3–1)
MONOCYTES NFR BLD: 8.3 % (ref 4–15)
NEUTROPHILS # BLD AUTO: 3.7 K/UL (ref 1.8–7.7)
NEUTROPHILS NFR BLD: 59.3 % (ref 38–73)
NRBC BLD-RTO: 0 /100 WBC
PLATELET # BLD AUTO: 225 K/UL (ref 150–450)
PMV BLD AUTO: 12 FL (ref 9.2–12.9)
POTASSIUM SERPL-SCNC: 4 MMOL/L (ref 3.5–5.1)
RBC # BLD AUTO: 4.07 M/UL (ref 4–5.4)
SODIUM SERPL-SCNC: 138 MMOL/L (ref 136–145)
WBC # BLD AUTO: 6.3 K/UL (ref 3.9–12.7)

## 2023-10-25 PROCEDURE — 58100 PR BIOPSY OF UTERUS LINING: ICD-10-PCS | Mod: S$GLB,,, | Performed by: OBSTETRICS & GYNECOLOGY

## 2023-10-25 PROCEDURE — 93005 ELECTROCARDIOGRAM TRACING: CPT

## 2023-10-25 PROCEDURE — 88305 TISSUE EXAM BY PATHOLOGIST: ICD-10-PCS | Mod: 26,,, | Performed by: PATHOLOGY

## 2023-10-25 PROCEDURE — 71046 X-RAY EXAM CHEST 2 VIEWS: CPT | Mod: TC,FY

## 2023-10-25 PROCEDURE — 71046 XR CHEST PA AND LATERAL PRE-OP: ICD-10-PCS | Mod: 26,,, | Performed by: INTERNAL MEDICINE

## 2023-10-25 PROCEDURE — 88305 TISSUE EXAM BY PATHOLOGIST: CPT | Performed by: PATHOLOGY

## 2023-10-25 PROCEDURE — 88305 TISSUE EXAM BY PATHOLOGIST: CPT | Mod: 26,,, | Performed by: PATHOLOGY

## 2023-10-25 PROCEDURE — 85025 COMPLETE CBC W/AUTO DIFF WBC: CPT | Performed by: OBSTETRICS & GYNECOLOGY

## 2023-10-25 PROCEDURE — 58100 BIOPSY OF UTERUS LINING: CPT | Mod: S$GLB,,, | Performed by: OBSTETRICS & GYNECOLOGY

## 2023-10-25 PROCEDURE — 99499 NO LOS: ICD-10-PCS | Mod: 59,S$GLB,, | Performed by: OBSTETRICS & GYNECOLOGY

## 2023-10-25 PROCEDURE — 71046 X-RAY EXAM CHEST 2 VIEWS: CPT | Mod: 26,,, | Performed by: INTERNAL MEDICINE

## 2023-10-25 PROCEDURE — 80048 BASIC METABOLIC PNL TOTAL CA: CPT | Performed by: OBSTETRICS & GYNECOLOGY

## 2023-10-25 PROCEDURE — 93010 ELECTROCARDIOGRAM REPORT: CPT | Mod: ,,, | Performed by: INTERNAL MEDICINE

## 2023-10-25 PROCEDURE — 99499 UNLISTED E&M SERVICE: CPT | Mod: 59,S$GLB,, | Performed by: OBSTETRICS & GYNECOLOGY

## 2023-10-25 PROCEDURE — 93010 EKG 12-LEAD: ICD-10-PCS | Mod: ,,, | Performed by: INTERNAL MEDICINE

## 2023-10-25 RX ORDER — MUPIROCIN 20 MG/G
OINTMENT TOPICAL
Status: CANCELLED | OUTPATIENT
Start: 2023-10-25

## 2023-10-25 RX ORDER — SODIUM CHLORIDE 9 MG/ML
INJECTION, SOLUTION INTRAVENOUS CONTINUOUS
Status: CANCELLED | OUTPATIENT
Start: 2023-10-25

## 2023-10-25 RX ORDER — FAMOTIDINE 20 MG/1
20 TABLET, FILM COATED ORAL
Status: CANCELLED | OUTPATIENT
Start: 2023-10-25

## 2023-10-25 NOTE — DISCHARGE INSTRUCTIONS
YOUR PROCEDURE WILL BE AT [facility] at [address] [place] [address]                  Before 7 AM, enter through the Emergency Entrance..   After 7 AM enter through the Main Entrance.                 Report to the Same Day Surgery Registration Desk in the hallway.(Just beside the Same Day Surgery Unit)      Your procedure  is scheduled for __11/14/2023________.    Call [phone] between 2pm and 5pm on ___11/13/2023____to find out your arrival time for the day of surgery.    You may have two visitors.  No children under 12 years old.     You will be going to the Same Day Surgery Unit on the 2nd floor of the hospital.    Important instructions:  Do not eat anything after midnight.  You may have plain water, non carbonated.  You may also have Gatorade or Powerade after midnight.    Stop all fluids 2 hours before your surgery.    It is okay to brush your teeth.  Do not have gum, candy or mints.    SEE MEDICATION SHEET.   TAKE MEDICATIONS AS DIRECTED WITH SIPS OF WATER.      Do not take any diabetic medication on the morning of surgery unless instructed to do so by your doctor or pre op nurse.      All GLP-1 weekly diabetic/weight loss medications must not be taken for one week before your surgery, or your surgery could be canceled.      STOP taking Aspirin, Ibuprofen,  Advil, Motrin, Mobic(meloxicam), Aleve (naproxen), Fish oil, and Vitamin E for at least 7 days before your surgery.     You may take Tylenol if needed which is not a blood thinner.    Please shower the night before and the morning of your surgery.      Follow any Prep Instructions given by your surgeon.    Use Chlorhexidine soap as instructed by your pre op nurse.   Please place clean linens on your bed the night before surgery. Please wear fresh clean clothing after each shower.    No shaving of procedural area at least 4-5 days before surgery due to increased risk of skin irritation and/or possible  infection.    Female patients may be asked for a urine specimen on the morning of the surgery.  Please check with your nurse before using the restroom.    Contact lenses and removable denture work may not be worn during your procedure.    You may wear deodorant only. If you are having breast surgery, do not wear deodorant on the operative side.    Do not wear powder, body lotion, perfume/cologne or make-up, oils, creams or rubs.    Do not wear any jewelry or have any metal on your body.    You will be asked to remove any dentures or partials for the procedure.    If you are going home on the same day of surgery, you must arrange for a family member or a friend to drive you home.  Public transportation is prohibited.  You will not be able to drive home if you were given anesthesia or sedation.    Patients who want to have their Post-op prescriptions filled from our in-house [facility], bring a Credit/Debit Card or cash with you. A co-pay may be required.  The pharmacy closes at 5:30 pm.    Wear loose fitting clothes allowing for bandages.    Please leave money and valuables home.      You may bring your cell phone.    Call the doctor if fever or illness should occur before your surgery.    Call [phone] to contact us here if needed.                            CLOTHES ON DAY OF SURGERY    SHOULDER surgery:  you must have a very oversized shirt.  Very, Very large.  You will probably have a large sling on with your arm strapped to your chest.  You will not be able to put the arm of the operated shoulder into a sleeve.  You can put the arm of the un-operated shoulder into the sleeve, but the shirt will need to be draped over the operated shoulder.       ARM or HAND surgery:  make sure that your sleeves are large and loose enough to pass over large dressings or cast.      BREAST or UNDERARM surgery:  wear a loose, button down shirt so that you can dress without raising your arms over your head.    ABDOMINAL surgery:   wear loose, comfortable clothing.  Nothing tight around the abdomen.  NO JEANS    PENIS or SCROTAL surgery:  loose comfortable clothing.  Large sweat pants, pajama pants or a robe.  ABSOLUTELY NO JEANS      LEG or FOOT surgery:  wear large loose pants that are able to pass over any large dressings or casts.  You could also wear loose shorts or a skirt.

## 2023-10-25 NOTE — H&P
SUBJECTIVE:   38 y.o. female   for endometrial ablation.     Patient's last menstrual period was 10/02/2023 (exact date)..        Pt with long standing AUB-HMB, endometriosis. 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 Polyp was found) in    Denies abnl pap   Denies std     Past Medical History:   Diagnosis Date    Asthma      Past Surgical History:   Procedure Laterality Date    DILATION AND CURETTAGE OF UTERUS       for polyp     Social History     Socioeconomic History    Marital status:    Tobacco Use    Smoking status: Never    Smokeless tobacco: Never   Substance and Sexual Activity    Alcohol use: Yes     Comment: social    Drug use: Yes     Types: Marijuana    Sexual activity: Yes     Partners: Male     Birth control/protection: None   Social History Narrative     since     She is teacher at [employer].     Family History   Problem Relation Age of Onset    Hodgkin's lymphoma Mother     Stroke Father     Hypertension Father     Drug abuse Father     Alcohol abuse Father     Endocrine tumor Sister     Lung cancer Paternal Grandmother         smoker    Fibroids Daughter     No Known Problems Son          Current Outpatient Medications   Medication Sig Dispense Refill     ADVAIR -21 mcg/actuation HFAA inhaler 2 puffs 2 (two) times daily.      albuterol (PROVENTIL/VENTOLIN HFA) 90 mcg/actuation inhaler 2 puffs every 6 (six) hours as needed.      cetirizine (ZYRTEC) 10 MG tablet Take 10 mg by mouth once daily.      fluticasone propionate (FLONASE) 50 mcg/actuation nasal spray 1 spray by Nasal route.      montelukast (SINGULAIR) 10 mg tablet Take 10 mg by mouth every evening.       No current facility-administered medications for this visit.     Allergies: Patient has no known allergies.       ROS  ROS:  GENERAL: Denies weight gain or weight loss. Feeling well overall.   SKIN: Denies rash or lesions.   HEAD: Denies head injury or headache.   NODES: Denies enlarged lymph nodes.   CHEST: Denies chest pain or shortness of breath.   CARDIOVASCULAR: Denies palpitations or left sided chest pain.   ABDOMEN: No abdominal pain, constipation, diarrhea, nausea, vomiting or rectal bleeding.   URINARY: No frequency, dysuria, hematuria, or burning on urination.  REPRODUCTIVE: Denies vaginal discharge, abnormal vaginal bleeding, lesions, pelvic pain  BREASTS: The patient performs breast self-examination and denies pain, lumps, or nipple discharge.   HEMATOLOGIC: No easy bruisability or excessive bleeding.  MUSCULOSKELETAL: Denies joint pain or swelling.   NEUROLOGIC: Denies syncope or weakness.   PSYCHIATRIC: Denies depression, anxiety or mood swings.      OBJECTIVE:   /70   Wt 79.8 kg (176 lb)   LMP 10/02/2023 (Exact Date)   BMI 29.29 kg/m²   The patient appears well, alert, oriented x 3, in no distress.  PSYCH:  Normal, full range of affect  NECK: negative, no thyromegaly, trachea midline  SKIN: normal, good color, good turgor and no acne, striae, hirsutism  HEART:  RRR  LUNGS:  CTAB  ABDOMEN: soft, non-tender; bowel sounds normal; no masses,  no organomegaly and no hernias, masses, or hepatosplenomegaly  GENITALIA: normal external genitalia, no erythema, no discharge  BLADDER:  soft  URETHRA: normal appearing urethra with no masses, tenderness or lesions and normal urethra, normal urethral meatus  VAGINA: Normal  CERVIX: no lesions or cervical motion tenderness  UTERUS: normal size, contour, position, consistency, mobility, non-tender  ADNEXA: no mass, fullness, tenderness      ASSESSMENT:       Abnormal uterine bleeding (AUB)  -  desired endometrial ablation  -  EMB done today and will f/u  -   pelvic US scheduled for next week  -   plan for novasure endometrial ablation with lap bilateral salpingectomy. Consent signed today

## 2023-10-25 NOTE — PROGRESS NOTES
CC: ENDOMETRIAL BIOPSPY    Daniela Dillon is a 38 y.o. female  presents for an endometrial biopsy secondary to AUB-HMB.  UPT is Negative.      PRE-ENDOMETRIAL BIOPSY COUNSELING:    The patient was informed of the risk of bleeding, infection, uterine perforation and pain and that the test will rule-out endometrial cancer with accuracy greater than 95%.  She was counseled on the alternatives to endometrial biopsy and agrees to proceed.      TIME OUT PERFORMED.    The cervix was visualized with a speculum.    The cervix was prepped with iodine.    A single tooth tenaculum was placed on the anterior lip prior to the biopsy.      A sterile endometrial pipelle was passed without difficulty to a depth of 7 cm.    Endometrial tissue was obtained.      The specimen was placed in formalyn and sent to Pathology of histology evaluation.    The patient tolerated the procedure well.      ASSESSMENT AND PLAN  The encounter diagnosis was Abnormal uterine bleeding (AUB).    POST ENDOMETRIAL BIOPSY COUNSELING:  Manage post biopsy cramping with NSAIDs or Tylenol.  Expect spotting or light bleeding for a few days.  Report bleeding heavier than a period, fever > 101.0 F, worsening pain or a foul smelling vaginal discharge.      Counseling lasted approximately 15 minutes and all her questions were answered.      FOLLOW-UP:  Pending biopsy.

## 2023-10-27 LAB
FINAL PATHOLOGIC DIAGNOSIS: NORMAL
GROSS: NORMAL
Lab: NORMAL

## 2023-10-31 ENCOUNTER — HOSPITAL ENCOUNTER (OUTPATIENT)
Dept: RADIOLOGY | Facility: HOSPITAL | Age: 38
Discharge: HOME | End: 2023-10-31
Attending: OBSTETRICS & GYNECOLOGY
Payer: COMMERCIAL

## 2023-10-31 DIAGNOSIS — N93.9 ABNORMAL UTERINE BLEEDING (AUB): ICD-10-CM

## 2023-10-31 PROCEDURE — 76830 TRANSVAGINAL US NON-OB: CPT | Mod: TC

## 2023-10-31 PROCEDURE — 76830 TRANSVAGINAL US NON-OB: CPT | Mod: 26,,, | Performed by: RADIOLOGY

## 2023-10-31 PROCEDURE — 76856 US EXAM PELVIC COMPLETE: CPT | Mod: 26,,, | Performed by: RADIOLOGY

## 2023-10-31 PROCEDURE — 76830 US PELVIS COMP WITH TRANSVAG NON-OB (XPD): ICD-10-PCS | Mod: 26,,, | Performed by: RADIOLOGY

## 2023-10-31 PROCEDURE — 76856 US PELVIS COMP WITH TRANSVAG NON-OB (XPD): ICD-10-PCS | Mod: 26,,, | Performed by: RADIOLOGY

## 2023-11-11 ENCOUNTER — LAB VISIT (OUTPATIENT)
Dept: LAB | Facility: HOSPITAL | Age: 38
End: 2023-11-11
Attending: OBSTETRICS & GYNECOLOGY
Payer: COMMERCIAL

## 2023-11-11 DIAGNOSIS — Z01.818 PREOP TESTING: ICD-10-CM

## 2023-11-11 LAB
ABO + RH BLD: NORMAL
BLD GP AB SCN CELLS X3 SERPL QL: NORMAL
HCG INTACT+B SERPL-ACNC: <1.2 MIU/ML
SPECIMEN OUTDATE: NORMAL

## 2023-11-11 PROCEDURE — 84702 CHORIONIC GONADOTROPIN TEST: CPT | Performed by: OBSTETRICS & GYNECOLOGY

## 2023-11-11 PROCEDURE — 36415 COLL VENOUS BLD VENIPUNCTURE: CPT | Performed by: OBSTETRICS & GYNECOLOGY

## 2023-11-11 PROCEDURE — 86901 BLOOD TYPING SEROLOGIC RH(D): CPT | Performed by: OBSTETRICS & GYNECOLOGY

## 2023-11-13 ENCOUNTER — TELEPHONE (OUTPATIENT)
Dept: SURGERY | Facility: HOSPITAL | Age: 38
End: 2023-11-13
Payer: COMMERCIAL

## 2023-11-14 ENCOUNTER — HOSPITAL ENCOUNTER (OUTPATIENT)
Facility: HOSPITAL | Age: 38
Discharge: HOME | End: 2023-11-14
Attending: OBSTETRICS & GYNECOLOGY | Admitting: OBSTETRICS & GYNECOLOGY
Payer: COMMERCIAL

## 2023-11-14 ENCOUNTER — ANESTHESIA (OUTPATIENT)
Dept: SURGERY | Facility: HOSPITAL | Age: 38
End: 2023-11-14
Payer: COMMERCIAL

## 2023-11-14 VITALS
RESPIRATION RATE: 16 BRPM | BODY MASS INDEX: 29.29 KG/M2 | TEMPERATURE: 98 F | HEART RATE: 78 BPM | OXYGEN SATURATION: 100 % | SYSTOLIC BLOOD PRESSURE: 121 MMHG | WEIGHT: 176 LBS | DIASTOLIC BLOOD PRESSURE: 56 MMHG

## 2023-11-14 DIAGNOSIS — Z30.2 REQUEST FOR STERILIZATION: ICD-10-CM

## 2023-11-14 DIAGNOSIS — Z98.890 S/P ENDOMETRIAL ABLATION: Primary | ICD-10-CM

## 2023-11-14 DIAGNOSIS — N93.9 ABNORMAL UTERINE BLEEDING (AUB): ICD-10-CM

## 2023-11-14 PROCEDURE — 25000003 PHARM REV CODE 250: Performed by: ANESTHESIOLOGY

## 2023-11-14 PROCEDURE — 71000016 HC POSTOP RECOV ADDL HR: Performed by: OBSTETRICS & GYNECOLOGY

## 2023-11-14 PROCEDURE — 25000003 PHARM REV CODE 250: Performed by: STUDENT IN AN ORGANIZED HEALTH CARE EDUCATION/TRAINING PROGRAM

## 2023-11-14 PROCEDURE — 63600175 PHARM REV CODE 636 W HCPCS: Performed by: ANESTHESIOLOGY

## 2023-11-14 PROCEDURE — 36000708 HC OR TIME LEV III 1ST 15 MIN: Performed by: OBSTETRICS & GYNECOLOGY

## 2023-11-14 PROCEDURE — 71000039 HC RECOVERY, EACH ADD'L HOUR: Performed by: OBSTETRICS & GYNECOLOGY

## 2023-11-14 PROCEDURE — 71000015 HC POSTOP RECOV 1ST HR: Performed by: OBSTETRICS & GYNECOLOGY

## 2023-11-14 PROCEDURE — 37000009 HC ANESTHESIA EA ADD 15 MINS: Performed by: OBSTETRICS & GYNECOLOGY

## 2023-11-14 PROCEDURE — 37000008 HC ANESTHESIA 1ST 15 MINUTES: Performed by: OBSTETRICS & GYNECOLOGY

## 2023-11-14 PROCEDURE — 58661 LAPAROSCOPY REMOVE ADNEXA: CPT | Mod: 50,,, | Performed by: OBSTETRICS & GYNECOLOGY

## 2023-11-14 PROCEDURE — D9220A PRA ANESTHESIA: ICD-10-PCS | Mod: CRNA,,, | Performed by: STUDENT IN AN ORGANIZED HEALTH CARE EDUCATION/TRAINING PROGRAM

## 2023-11-14 PROCEDURE — 25000003 PHARM REV CODE 250: Performed by: OBSTETRICS & GYNECOLOGY

## 2023-11-14 PROCEDURE — 58661 PR LAP,RMV  ADNEXAL STRUCTURE: ICD-10-PCS | Mod: 50,,, | Performed by: OBSTETRICS & GYNECOLOGY

## 2023-11-14 PROCEDURE — 88302 TISSUE EXAM BY PATHOLOGIST: CPT | Mod: 26,,, | Performed by: PATHOLOGY

## 2023-11-14 PROCEDURE — D9220A PRA ANESTHESIA: ICD-10-PCS | Mod: ANES,,, | Performed by: ANESTHESIOLOGY

## 2023-11-14 PROCEDURE — 36000709 HC OR TIME LEV III EA ADD 15 MIN: Performed by: OBSTETRICS & GYNECOLOGY

## 2023-11-14 PROCEDURE — 71000033 HC RECOVERY, INTIAL HOUR: Performed by: OBSTETRICS & GYNECOLOGY

## 2023-11-14 PROCEDURE — 58563 HYSTEROSCOPY ABLATION: CPT | Mod: 51,,, | Performed by: OBSTETRICS & GYNECOLOGY

## 2023-11-14 PROCEDURE — D9220A PRA ANESTHESIA: Mod: CRNA,,, | Performed by: STUDENT IN AN ORGANIZED HEALTH CARE EDUCATION/TRAINING PROGRAM

## 2023-11-14 PROCEDURE — 27201423 OPTIME MED/SURG SUP & DEVICES STERILE SUPPLY: Performed by: OBSTETRICS & GYNECOLOGY

## 2023-11-14 PROCEDURE — D9220A PRA ANESTHESIA: Mod: ANES,,, | Performed by: ANESTHESIOLOGY

## 2023-11-14 PROCEDURE — 58563 PR HYSTEROSCOPY,W/ENDOMETRIAL ABLATION: ICD-10-PCS | Mod: 51,,, | Performed by: OBSTETRICS & GYNECOLOGY

## 2023-11-14 PROCEDURE — 63600175 PHARM REV CODE 636 W HCPCS: Performed by: STUDENT IN AN ORGANIZED HEALTH CARE EDUCATION/TRAINING PROGRAM

## 2023-11-14 PROCEDURE — 88302 PR  SURG PATH,LEVEL II: ICD-10-PCS | Mod: 26,,, | Performed by: PATHOLOGY

## 2023-11-14 PROCEDURE — 82962 GLUCOSE BLOOD TEST: CPT | Performed by: OBSTETRICS & GYNECOLOGY

## 2023-11-14 PROCEDURE — 88302 TISSUE EXAM BY PATHOLOGIST: CPT | Performed by: PATHOLOGY

## 2023-11-14 RX ORDER — ACETAMINOPHEN 500 MG
1000 TABLET ORAL
Status: COMPLETED | OUTPATIENT
Start: 2023-11-14 | End: 2023-11-14

## 2023-11-14 RX ORDER — HYDROCODONE BITARTRATE AND ACETAMINOPHEN 5; 325 MG/1; MG/1
1 TABLET ORAL EVERY 4 HOURS PRN
Status: CANCELLED | OUTPATIENT
Start: 2023-11-14

## 2023-11-14 RX ORDER — PROCHLORPERAZINE EDISYLATE 5 MG/ML
5 INJECTION INTRAMUSCULAR; INTRAVENOUS EVERY 6 HOURS PRN
Status: CANCELLED | OUTPATIENT
Start: 2023-11-14

## 2023-11-14 RX ORDER — PROCHLORPERAZINE EDISYLATE 5 MG/ML
INJECTION INTRAMUSCULAR; INTRAVENOUS
Status: DISCONTINUED | OUTPATIENT
Start: 2023-11-14 | End: 2023-11-14

## 2023-11-14 RX ORDER — IBUPROFEN 600 MG/1
600 TABLET ORAL EVERY 6 HOURS PRN
Qty: 30 TABLET | Refills: 0 | Status: SHIPPED | OUTPATIENT
Start: 2023-11-14

## 2023-11-14 RX ORDER — MUPIROCIN 20 MG/G
OINTMENT TOPICAL
Status: DISCONTINUED | OUTPATIENT
Start: 2023-11-14 | End: 2023-11-14 | Stop reason: HOSPADM

## 2023-11-14 RX ORDER — ONDANSETRON 4 MG/1
8 TABLET, ORALLY DISINTEGRATING ORAL EVERY 8 HOURS PRN
Status: CANCELLED | OUTPATIENT
Start: 2023-11-14

## 2023-11-14 RX ORDER — HYDROMORPHONE HYDROCHLORIDE 2 MG/ML
0.2 INJECTION, SOLUTION INTRAMUSCULAR; INTRAVENOUS; SUBCUTANEOUS EVERY 5 MIN PRN
Status: DISCONTINUED | OUTPATIENT
Start: 2023-11-14 | End: 2023-11-14 | Stop reason: HOSPADM

## 2023-11-14 RX ORDER — SODIUM CHLORIDE 0.9 % (FLUSH) 0.9 %
10 SYRINGE (ML) INJECTION
Status: DISCONTINUED | OUTPATIENT
Start: 2023-11-14 | End: 2023-11-14 | Stop reason: HOSPADM

## 2023-11-14 RX ORDER — IBUPROFEN 600 MG/1
600 TABLET ORAL EVERY 6 HOURS PRN
Status: CANCELLED | OUTPATIENT
Start: 2023-11-14

## 2023-11-14 RX ORDER — PHENYLEPHRINE HYDROCHLORIDE 10 MG/ML
INJECTION INTRAVENOUS
Status: DISCONTINUED | OUTPATIENT
Start: 2023-11-14 | End: 2023-11-14

## 2023-11-14 RX ORDER — DEXAMETHASONE SODIUM PHOSPHATE 4 MG/ML
INJECTION, SOLUTION INTRA-ARTICULAR; INTRALESIONAL; INTRAMUSCULAR; INTRAVENOUS; SOFT TISSUE
Status: DISCONTINUED | OUTPATIENT
Start: 2023-11-14 | End: 2023-11-14

## 2023-11-14 RX ORDER — ONDANSETRON 2 MG/ML
INJECTION INTRAMUSCULAR; INTRAVENOUS
Status: DISCONTINUED | OUTPATIENT
Start: 2023-11-14 | End: 2023-11-14

## 2023-11-14 RX ORDER — MORPHINE SULFATE 4 MG/ML
3 INJECTION, SOLUTION INTRAMUSCULAR; INTRAVENOUS
Status: CANCELLED | OUTPATIENT
Start: 2023-11-14

## 2023-11-14 RX ORDER — KETOROLAC TROMETHAMINE 30 MG/ML
INJECTION, SOLUTION INTRAMUSCULAR; INTRAVENOUS
Status: DISCONTINUED | OUTPATIENT
Start: 2023-11-14 | End: 2023-11-14

## 2023-11-14 RX ORDER — LIDOCAINE HYDROCHLORIDE 10 MG/ML
1 INJECTION, SOLUTION EPIDURAL; INFILTRATION; INTRACAUDAL; PERINEURAL ONCE
Status: DISCONTINUED | OUTPATIENT
Start: 2023-11-14 | End: 2023-11-14 | Stop reason: HOSPADM

## 2023-11-14 RX ORDER — DIPHENHYDRAMINE HYDROCHLORIDE 50 MG/ML
25 INJECTION INTRAMUSCULAR; INTRAVENOUS EVERY 4 HOURS PRN
Status: CANCELLED | OUTPATIENT
Start: 2023-11-14

## 2023-11-14 RX ORDER — LIDOCAINE HYDROCHLORIDE 20 MG/ML
INJECTION INTRAVENOUS
Status: DISCONTINUED | OUTPATIENT
Start: 2023-11-14 | End: 2023-11-14

## 2023-11-14 RX ORDER — HYDROCODONE BITARTRATE AND ACETAMINOPHEN 5; 325 MG/1; MG/1
1 TABLET ORAL EVERY 6 HOURS PRN
Qty: 10 TABLET | Refills: 0 | Status: SHIPPED | OUTPATIENT
Start: 2023-11-14

## 2023-11-14 RX ORDER — FENTANYL CITRATE 50 UG/ML
INJECTION, SOLUTION INTRAMUSCULAR; INTRAVENOUS
Status: DISCONTINUED | OUTPATIENT
Start: 2023-11-14 | End: 2023-11-14

## 2023-11-14 RX ORDER — HYDROMORPHONE HYDROCHLORIDE 2 MG/ML
1 INJECTION, SOLUTION INTRAMUSCULAR; INTRAVENOUS; SUBCUTANEOUS EVERY 6 HOURS PRN
Status: CANCELLED | OUTPATIENT
Start: 2023-11-14

## 2023-11-14 RX ORDER — PROPOFOL 10 MG/ML
VIAL (ML) INTRAVENOUS
Status: DISCONTINUED | OUTPATIENT
Start: 2023-11-14 | End: 2023-11-14

## 2023-11-14 RX ORDER — SODIUM CHLORIDE 9 MG/ML
INJECTION, SOLUTION INTRAVENOUS CONTINUOUS
Status: DISCONTINUED | OUTPATIENT
Start: 2023-11-14 | End: 2023-11-14 | Stop reason: HOSPADM

## 2023-11-14 RX ORDER — FAMOTIDINE 20 MG/1
20 TABLET, FILM COATED ORAL
Status: COMPLETED | OUTPATIENT
Start: 2023-11-14 | End: 2023-11-14

## 2023-11-14 RX ORDER — DOCUSATE SODIUM 100 MG/1
100 CAPSULE, LIQUID FILLED ORAL 2 TIMES DAILY
Qty: 60 CAPSULE | Refills: 2 | Status: SHIPPED | OUTPATIENT
Start: 2023-11-14 | End: 2024-11-13

## 2023-11-14 RX ORDER — ROCURONIUM BROMIDE 10 MG/ML
INJECTION, SOLUTION INTRAVENOUS
Status: DISCONTINUED | OUTPATIENT
Start: 2023-11-14 | End: 2023-11-14

## 2023-11-14 RX ORDER — DIPHENHYDRAMINE HCL 25 MG
25 CAPSULE ORAL EVERY 4 HOURS PRN
Status: CANCELLED | OUTPATIENT
Start: 2023-11-14

## 2023-11-14 RX ORDER — MIDAZOLAM HYDROCHLORIDE 1 MG/ML
INJECTION INTRAMUSCULAR; INTRAVENOUS
Status: DISCONTINUED | OUTPATIENT
Start: 2023-11-14 | End: 2023-11-14

## 2023-11-14 RX ORDER — SODIUM CHLORIDE, SODIUM LACTATE, POTASSIUM CHLORIDE, CALCIUM CHLORIDE 600; 310; 30; 20 MG/100ML; MG/100ML; MG/100ML; MG/100ML
INJECTION, SOLUTION INTRAVENOUS CONTINUOUS
Status: DISCONTINUED | OUTPATIENT
Start: 2023-11-14 | End: 2023-11-14 | Stop reason: HOSPADM

## 2023-11-14 RX ADMIN — ROCURONIUM BROMIDE 20 MG: 10 INJECTION, SOLUTION INTRAVENOUS at 01:11

## 2023-11-14 RX ADMIN — SODIUM CHLORIDE, POTASSIUM CHLORIDE, SODIUM LACTATE AND CALCIUM CHLORIDE: 600; 310; 30; 20 INJECTION, SOLUTION INTRAVENOUS at 02:11

## 2023-11-14 RX ADMIN — ROCURONIUM BROMIDE 50 MG: 10 INJECTION, SOLUTION INTRAVENOUS at 01:11

## 2023-11-14 RX ADMIN — FAMOTIDINE 20 MG: 20 TABLET ORAL at 09:11

## 2023-11-14 RX ADMIN — HYDROMORPHONE HYDROCHLORIDE 0.2 MG: 2 INJECTION INTRAMUSCULAR; INTRAVENOUS; SUBCUTANEOUS at 04:11

## 2023-11-14 RX ADMIN — LIDOCAINE HYDROCHLORIDE 40 MG: 20 INJECTION, SOLUTION INTRAVENOUS at 02:11

## 2023-11-14 RX ADMIN — PROPOFOL 30 MG: 10 INJECTION, EMULSION INTRAVENOUS at 02:11

## 2023-11-14 RX ADMIN — DEXAMETHASONE SODIUM PHOSPHATE 8 MG: 4 INJECTION, SOLUTION INTRAMUSCULAR; INTRAVENOUS at 01:11

## 2023-11-14 RX ADMIN — MIDAZOLAM HYDROCHLORIDE 2 MG: 1 INJECTION INTRAMUSCULAR; INTRAVENOUS at 01:11

## 2023-11-14 RX ADMIN — LIDOCAINE HYDROCHLORIDE 100 MG: 20 INJECTION, SOLUTION INTRAVENOUS at 01:11

## 2023-11-14 RX ADMIN — PROCHLORPERAZINE EDISYLATE 5 MG: 5 INJECTION INTRAMUSCULAR; INTRAVENOUS at 01:11

## 2023-11-14 RX ADMIN — MUPIROCIN: 20 OINTMENT TOPICAL at 09:11

## 2023-11-14 RX ADMIN — PHENYLEPHRINE HYDROCHLORIDE 100 MCG: 10 INJECTION INTRAVENOUS at 01:11

## 2023-11-14 RX ADMIN — SUGAMMADEX 200 MG: 100 INJECTION, SOLUTION INTRAVENOUS at 02:11

## 2023-11-14 RX ADMIN — PROPOFOL 150 MG: 10 INJECTION, EMULSION INTRAVENOUS at 01:11

## 2023-11-14 RX ADMIN — PHENYLEPHRINE HYDROCHLORIDE 200 MCG: 10 INJECTION INTRAVENOUS at 01:11

## 2023-11-14 RX ADMIN — SODIUM CHLORIDE, POTASSIUM CHLORIDE, SODIUM LACTATE AND CALCIUM CHLORIDE: 600; 310; 30; 20 INJECTION, SOLUTION INTRAVENOUS at 09:11

## 2023-11-14 RX ADMIN — FENTANYL CITRATE 100 MCG: 50 INJECTION, SOLUTION INTRAMUSCULAR; INTRAVENOUS at 01:11

## 2023-11-14 RX ADMIN — ACETAMINOPHEN 1000 MG: 500 TABLET ORAL at 09:11

## 2023-11-14 RX ADMIN — KETOROLAC TROMETHAMINE 30 MG: 30 INJECTION, SOLUTION INTRAMUSCULAR; INTRAVENOUS at 02:11

## 2023-11-14 RX ADMIN — ONDANSETRON 4 MG: 2 INJECTION, SOLUTION INTRAMUSCULAR; INTRAVENOUS at 01:11

## 2023-11-14 NOTE — ANESTHESIA PREPROCEDURE EVALUATION
11/14/2023  Daniela Dillon is a 38 y.o., female.      Pre-op Assessment    I have reviewed the Patient Summary Reports.     I have reviewed the Nursing Notes. I have reviewed the NPO Status.   I have reviewed the Medications.     Review of Systems  Anesthesia Hx:  No problems with previous Anesthesia             Denies Family Hx of Anesthesia complications.    Denies Personal Hx of Anesthesia complications.                    Social:  Non-Smoker, No Alcohol Use       Hematology/Oncology:  Hematology Normal   Oncology Normal                                   EENT/Dental:  EENT/Dental Normal           Cardiovascular:  Cardiovascular Normal                                            Pulmonary:    Asthma moderate                   Renal/:  Renal/ Normal                 Hepatic/GI:  Hepatic/GI Normal                 Musculoskeletal:  Musculoskeletal Normal                Neurological:  Neurology Normal                                      Endocrine:  Endocrine Normal                Physical Exam  General: Well nourished, Cooperative, Alert and Oriented    Airway:  Mallampati: II   Mouth Opening: Normal  TM Distance: Normal  Tongue: Normal  Neck ROM: Normal ROM    Dental:  Intact        Anesthesia Plan  Type of Anesthesia, risks & benefits discussed:    Anesthesia Type: Gen ETT  Intra-op Monitoring Plan: Standard ASA Monitors  Induction:  IV  Airway Plan: Video, Post-Induction  Informed Consent: Informed consent signed with the Patient and all parties understand the risks and agree with anesthesia plan.  All questions answered. Patient consented to blood products? No  ASA Score: 2    Ready For Surgery From Anesthesia Perspective.     .       Breath sounds clear and equal bilaterally.

## 2023-11-14 NOTE — PLAN OF CARE
Pre-op plan of care reviewed with patient. Admit assessment complete. Questions encouraged and answered. Post-op education begun with pt. Pt ready to proceed.

## 2023-11-14 NOTE — H&P
SUBJECTIVE:   38 y.o. female   for endometrial ablation.      Patient's last menstrual period was 10/02/2023 (exact date)..         Pt with long standing AUB-HMB, endometriosis. 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     COMPARISON:  None.     FINDINGS:  Uterus:     Size: 7.3 x 4.4 x 5.3 cm     Masses: None     Endometrium: Normal in this pre menopausal patient, measuring the 2.6 mm.     Right ovary:     Size: Is 2.7 cm     Appearance: Normal     Vascular flow: Normal.     Left ovary:     Size: 2.7 cm     Appearance: Normal     Vascular Flow: Normal.     Free Fluid:     Physiologic     Impression:     No sonographic abnormality.        Electronically signed by: Josh Staton Jr  Date:                                            10/31/2023  Time:                                           16:22                    OB History    Para Term  AB Living   2 2 2 0 0 2   SAB IAB Ectopic Multiple Live Births              2          # Outcome Date GA Lbr Simon/2nd Weight Sex Delivery Anes PTL Lv   2 Term                      1 Term                         Obstetric Comments   Gynhx: reg/7-10 days (heavy)    endometriosis   H/o hysteroscopy D&C ( ? Polyp was found) in 2021   Denies abnl pap   Denies std           Past Medical History:   Diagnosis Date    Asthma              Past Surgical History:   Procedure Laterality Date    DILATION AND CURETTAGE OF UTERUS         2021 for polyp      Social History               Socioeconomic History    Marital status:    Tobacco Use    Smoking status: Never    Smokeless tobacco: Never   Substance and Sexual Activity    Alcohol use: Yes       Comment: social    Drug use: Yes       Types: Marijuana    Sexual activity: Yes       Partners: Male       Birth control/protection: None   Social History Narrative      since 2009     She is teacher at [employer].               Family History   Problem Relation Age of Onset    Hodgkin's lymphoma Mother      Stroke Father      Hypertension Father      Drug abuse Father      Alcohol abuse Father      Endocrine tumor Sister      Lung cancer Paternal Grandmother           smoker    Fibroids Daughter      No Known Problems Son              Current Medications          Current Outpatient Medications   Medication Sig Dispense Refill    ADVAIR -21 mcg/actuation HFAA inhaler 2 puffs 2 (two) times daily.        albuterol (PROVENTIL/VENTOLIN HFA) 90 mcg/actuation inhaler 2 puffs every 6 (six) hours as needed.        cetirizine (ZYRTEC) 10 MG tablet Take 10 mg by mouth once daily.        fluticasone propionate (FLONASE) 50 mcg/actuation nasal spray 1 spray by Nasal route.        montelukast (SINGULAIR) 10 mg tablet Take 10 mg by mouth every evening.          No current facility-administered medications for this visit.         Allergies: Patient has no known allergies.         ROS  ROS:  GENERAL: Denies weight gain or weight loss. Feeling well overall.   SKIN: Denies rash or lesions.   HEAD: Denies head injury or headache.   NODES:  Denies enlarged lymph nodes.   CHEST: Denies chest pain or shortness of breath.   CARDIOVASCULAR: Denies palpitations or left sided chest pain.   ABDOMEN: No abdominal pain, constipation, diarrhea, nausea, vomiting or rectal bleeding.   URINARY: No frequency, dysuria, hematuria, or burning on urination.  REPRODUCTIVE: Denies vaginal discharge, abnormal vaginal bleeding, lesions, pelvic pain  BREASTS: The patient performs breast self-examination and denies pain, lumps, or nipple discharge.   HEMATOLOGIC: No easy bruisability or excessive bleeding.  MUSCULOSKELETAL: Denies joint pain or swelling.   NEUROLOGIC: Denies syncope or weakness.   PSYCHIATRIC: Denies depression, anxiety or mood swings.        OBJECTIVE:   /70   Wt 79.8 kg (176 lb)   LMP 10/02/2023 (Exact Date)   BMI 29.29 kg/m²   The patient appears well, alert, oriented x 3, in no distress.  PSYCH:  Normal, full range of affect  NECK: negative, no thyromegaly, trachea midline  SKIN: normal, good color, good turgor and no acne, striae, hirsutism  HEART:  RRR  LUNGS:  CTAB  ABDOMEN: soft, non-tender; bowel sounds normal; no masses,  no organomegaly and no hernias, masses, or hepatosplenomegaly  GENITALIA: normal external genitalia, no erythema, no discharge  BLADDER: soft  URETHRA: normal appearing urethra with no masses, tenderness or lesions and normal urethra, normal urethral meatus  VAGINA: Normal  CERVIX: no lesions or cervical motion tenderness  UTERUS: normal size, contour, position, consistency, mobility, non-tender  ADNEXA: no mass, fullness, tenderness        ASSESSMENT:         Abnormal uterine bleeding (AUB)  -  desired endometrial ablation  -  EMB done today and will f/u  -   pelvic US scheduled for next week  -   plan for novasure endometrial ablation with lap bilateral salpingectomy. Consent signed today

## 2023-11-14 NOTE — ANESTHESIA PROCEDURE NOTES
Intubation    Date/Time: 11/14/2023 1:13 PM    Performed by: Kristine Egan CRNA  Authorized by: Antonio Gardner MD    Intubation:     Induction:  Intravenous    Intubated:  Postinduction    Mask Ventilation:  Easy mask    Attempts:  1    Attempted By:  Student    Method of Intubation:  Video laryngoscopy    Blade:  Caban 3    Laryngeal View Grade: Grade I - full view of cords      Difficult Airway Encountered?: No      Complications:  None    Airway Device:  Oral endotracheal tube    Airway Device Size:  7.0    Style/Cuff Inflation:  Cuffed (inflated to minimal occlusive pressure)    Tube secured:  21    Secured at:  The lips    Placement Verified By:  Capnometry    Complicating Factors:  None    Findings Post-Intubation:  BS equal bilateral and atraumatic/condition of teeth unchanged

## 2023-11-14 NOTE — OP NOTE
PROCEDURE NOTE    Preoperative Diagnosis: 1.  Desires permanent sterilization    2. AUB-HMB    Postoperative Diagnosis: same    Procedure Performed: 1.  Laparoscopic bilateral salpingectomy    2. Hysteroscopy D&C with novasure endometrial ablation    Surgeon: Yaa Slaughter MD    Assistants: None    Anesthesia: GETA    Findings: Normal uterus, tubes, and ovaries    EBL: minimal    Complications: None    Antibiotics: None    Pathology specimen: bilateral fallopian tubes    Indications: 37y/o female with long standing AUB-HMB, failed medical treatment and desired novasure ablation with  permanent sterilization. All risks/benefits/alternatives discussed including bleeding, infection, damage to bowel, bladder, internal organs, laparotomy, risk of failure < 1/200 with increased chance of ectopic if gestation occurs. Pt understands and wishes to proceed. All questions answered.    Procedure: The patient was taken to the operating room where general anesthesia was noted to be adequate. She was then prepped and draped in the dorsal supine position with lower extremities in Matheus stirrups. An appropriate time-out was performed. The bladder was drained.    The a heavy weight speculum placed in the patient's vagina. A julian retractor was used to identify the anterior lip of the cervix which was grasped with a single tooth tenaculum and and the cervix was dilated with Theron dilators.    The hysteroscope was inserted atraumatically under direct visualization. The saline was then infused under low pressure to perform a diagnostic hysteroscopy. Both tubal ostia were visualized. A D&C was done.     The hysteroscope was removed and the cervix was futher dilated. The Novasure device was inserted. The uterine cavity size was 5 cm x 3.7 cm.  The carbon dioxide perforation test was performed and passed. The Novasure device was then activated. Total ablation time was 120 seconds.    The hysteroscope was reinserted and there was noted to be  adequate ablation of most of the endometrial lining.    Fluid deficit was 150 cc.     Attention was then turned to the patient's abdomen.  A 8 mm incision was made immediately inferior to the umbilicus.  The veres needle was inserted and saline drop test was done.  The abdomen was insufflated with CO2 with entry pressure of 3mmHg and maximum pressure of 15 mmHg.  The trocar and laparoscope were introduced into the peritoneal cavity under direct visualization without difficulty. The bilateral lower trocar was introduced under direct visualization.    Inspection of the abdominopelvic cavity revealed normal anatomy.     The left tube was grasped at the fimbriated end.  The Cool seal bipolar device was used to coagulated then transected from the fimbria to the uterine cornua. The same procedure was done on the right tube.  Both tubes were removed through the 8 mm trocar    The abdomen was then irrigated and suction.  The intraperitoneal pressure was decreased to 5 mmHg and there was no bleeding observed.      All instruments were then removed from the patient's abdomen. The skin incisions were closed with 4-0 monocryl and dermabond. There was excellent hemostasis. All instruments were then removed from the patient's vagina. No bleeding was noted in the vagina.    The patient tolerated the procedure well. Sponge, lap, and needle counts were correct x 2. The patient was taken to the recovery room in stable condition.    Yaa Slaughter

## 2023-11-14 NOTE — TRANSFER OF CARE
Anesthesia Transfer of Care Note    Patient: Daniela Dillon    Procedure(s) Performed: Procedure(s) (LRB):  ABLATION, ENDOMETRIUM, THERMAL, HYSTEROSCOPIC (N/A)  SALPINGECTOMY, LAPAROSCOPIC (Bilateral)    Patient location: PACU    Anesthesia Type: general    Transport from OR: Transported from OR on 6-10 L/min O2 by face mask with adequate spontaneous ventilation    Post pain: adequate analgesia    Post assessment: no apparent anesthetic complications and tolerated procedure well    Post vital signs: stable    Level of consciousness: sedated and responds to stimulation    Nausea/Vomiting: no nausea/vomiting    Complications: none    Transfer of care protocol was followed      Last vitals: Visit Vitals  /64   Pulse 93   Temp 36.4 °C (97.5 °F) (Oral)   Resp 17   Wt 79.8 kg (176 lb)   LMP 10/27/2023 (Exact Date)   SpO2 100%   Breastfeeding No   BMI 29.29 kg/m²

## 2023-11-15 ENCOUNTER — TELEPHONE (OUTPATIENT)
Dept: OBSTETRICS AND GYNECOLOGY | Facility: CLINIC | Age: 38
End: 2023-11-15
Payer: COMMERCIAL

## 2023-11-15 LAB — POCT GLUCOSE: 92 MG/DL (ref 70–110)

## 2023-11-15 NOTE — PLAN OF CARE
Care complete. Pt verbalized understanding of discharge instructions and readiness to go home. Rx delivered from [facility] pharmacy. Now waiting for transport home.

## 2023-11-15 NOTE — DISCHARGE SUMMARY
[facility] - Surgery  Discharge Note  Short Stay    Procedure(s) (LRB):  ABLATION, ENDOMETRIUM, THERMAL, HYSTEROSCOPIC (N/A)  SALPINGECTOMY, LAPAROSCOPIC (Bilateral)      OUTCOME: Condition has improved and patient is now ready for discharge.    DISPOSITION: Home or Self Care    FINAL DIAGNOSIS:  <principal problem not specified>    FOLLOWUP: In clinic    DISCHARGE INSTRUCTIONS:    Discharge Procedure Orders   Diet Adult Regular     Pelvic Rest     Notify your health care provider if you experience any of the following:  increased confusion or weakness     Notify your health care provider if you experience any of the following:  persistent dizziness, light-headedness, or visual disturbances     Notify your health care provider if you experience any of the following:  worsening rash     Notify your health care provider if you experience any of the following:  severe persistent headache     Notify your health care provider if you experience any of the following:  difficulty breathing or increased cough     Notify your health care provider if you experience any of the following:  redness, tenderness, or signs of infection (pain, swelling, redness, odor or green/yellow discharge around incision site)     Notify your health care provider if you experience any of the following:  severe uncontrolled pain     Notify your health care provider if you experience any of the following:  persistent nausea and vomiting or diarrhea     Notify your health care provider if you experience any of the following:  temperature >100.4     No dressing needed     Activity as tolerated         Clinical Reference Documents Added to Patient Instructions         Document    FALLOPIAN TUBE REMOVAL DISCHARGE INSTRUCTIONS (ENGLISH)    HYSTEROSCOPY DISCHARGE INSTRUCTIONS (ENGLISH)            TIME SPENT ON DISCHARGE: 5 minutes

## 2023-11-15 NOTE — TELEPHONE ENCOUNTER
----- Message from Clifford Baze sent at 11/15/2023  2:45 PM CST -----  Regarding: Self .[phone]  Patient is requesting a sooner appointment.  Patient declined first available appointment listed as well as another facility and provider .  Patient will not accept being placed on the waitlist and is requesting a message be sent to doctor.    Name of Caller: Self     When is the first available appointment?  12/20    Symptoms:  Post op     Would the patient rather a call back or a response via [facility]?  Call back     Best Call Back Number:  [phone]      Additional Information: Pt had a sx on 11/14 and was told to schedule a post op visit with Dr Slaughter within two weeks. Please call pt back with sooner appt times.

## 2023-11-15 NOTE — ANESTHESIA POSTPROCEDURE EVALUATION
Anesthesia Post Evaluation    Patient: Daniela Dillon    Procedure(s) Performed: Procedure(s) (LRB):  ABLATION, ENDOMETRIUM, THERMAL, HYSTEROSCOPIC (N/A)  SALPINGECTOMY, LAPAROSCOPIC (Bilateral)    Final Anesthesia Type: general      Patient location during evaluation: [facility]  Patient participation: Yes- Able to Participate  Level of consciousness: awake and alert  Post-procedure vital signs: reviewed and stable  Pain management: adequate  Airway patency: patent    PONV status at discharge: No PONV  Anesthetic complications: no      Cardiovascular status: hemodynamically stable and blood pressure returned to baseline  Respiratory status: room air, spontaneous ventilation and unassisted  Hydration status: euvolemic  Follow-up not needed.          Vitals Value Taken Time   /56 11/14/23 1754   Temp 36.9 °C (98.4 °F) 11/14/23 1754   Pulse 78 11/14/23 1754   Resp 16 11/14/23 1754   SpO2 100 % 11/14/23 1754         Event Time   Out of Recovery 16:26:00         Pain/Dafne Score: Pain Rating Prior to Med Admin: 3 (11/14/2023  4:25 PM)  Dafne Score: 10 (11/14/2023  5:54 PM)  Modified Dafne Score: 20 (11/14/2023  5:54 PM)

## 2023-11-15 NOTE — ANESTHESIA POSTPROCEDURE EVALUATION
Anesthesia Post Evaluation    Patient: Daniela Dillon    Procedure(s) Performed: Procedure(s) (LRB):  ABLATION, ENDOMETRIUM, THERMAL, HYSTEROSCOPIC (N/A)  SALPINGECTOMY, LAPAROSCOPIC (Bilateral)    Final Anesthesia Type: general      Patient location during evaluation: [facility]  Patient participation: Yes- Able to Participate  Level of consciousness: awake and alert  Post-procedure vital signs: reviewed and stable  Pain management: adequate  Airway patency: patent    PONV status at discharge: No PONV      Cardiovascular status: hemodynamically stable and blood pressure returned to baseline  Respiratory status: room air, spontaneous ventilation and unassisted  Hydration status: euvolemic            Vitals Value Taken Time   /56 11/14/23 1754   Temp 36.9 °C (98.4 °F) 11/14/23 1754   Pulse 78 11/14/23 1754   Resp 16 11/14/23 1754   SpO2 100 % 11/14/23 1754         Event Time   Out of Recovery 16:26:00         Pain/Dafne Score: Pain Rating Prior to Med Admin: 3 (11/14/2023  4:25 PM)  Dafne Score: 10 (11/14/2023  5:54 PM)  Modified Dafne Score: 20 (11/14/2023  5:54 PM)

## 2023-11-15 NOTE — ANESTHESIA POSTPROCEDURE EVALUATION
Anesthesia Post Evaluation    Patient: Daniela Dillon    Procedure(s) Performed: Procedure(s) (LRB):  ABLATION, ENDOMETRIUM, THERMAL, HYSTEROSCOPIC (N/A)  SALPINGECTOMY, LAPAROSCOPIC (Bilateral)    Final Anesthesia Type: general      Patient location during evaluation: [facility]  Patient participation: Yes- Able to Participate  Level of consciousness: awake and alert  Post-procedure vital signs: reviewed and stable  Pain management: adequate  Airway patency: patent    PONV status at discharge: No PONV      Cardiovascular status: hemodynamically stable and blood pressure returned to baseline  Respiratory status: room air, spontaneous ventilation and unassisted  Hydration status: euvolemic  Follow-up not needed.          Vitals Value Taken Time   /56 11/14/23 1754   Temp 36.9 °C (98.4 °F) 11/14/23 1754   Pulse 78 11/14/23 1754   Resp 16 11/14/23 1754   SpO2 100 % 11/14/23 1754         Event Time   Out of Recovery 16:26:00         Pain/Dafne Score: Pain Rating Prior to Med Admin: 3 (11/14/2023  4:25 PM)  Dafne Score: 10 (11/14/2023  5:54 PM)  Modified Dafne Score: 20 (11/14/2023  5:54 PM)

## 2023-11-16 LAB
FINAL PATHOLOGIC DIAGNOSIS: NORMAL
GROSS: NORMAL
Lab: NORMAL

## 2023-11-21 ENCOUNTER — TELEPHONE (OUTPATIENT)
Dept: OBSTETRICS AND GYNECOLOGY | Facility: CLINIC | Age: 38
End: 2023-11-21
Payer: COMMERCIAL

## 2023-11-21 NOTE — TELEPHONE ENCOUNTER
----- Message from Ary Ortiz sent at 11/21/2023 10:12 AM CST -----  Regarding: self [phone]  .Type: Patient Call Back    Who called:self    What is the request in detail: patient is requesting a call back in regards to a clearance from her post op procedure. She stated her job is requesting documentation to come back to work. Patient seeking advice on if she can upload documents into the portal and Dr. Slaughter can fill them out.    Can the clinic reply by [facility]? no    Would the patient rather a call back or a response via [facility]?call back     Best call back number [phone]

## 2023-11-29 ENCOUNTER — OFFICE VISIT (OUTPATIENT)
Dept: OBSTETRICS AND GYNECOLOGY | Facility: CLINIC | Age: 38
End: 2023-11-29
Payer: COMMERCIAL

## 2023-11-29 VITALS — BODY MASS INDEX: 29.2 KG/M2 | DIASTOLIC BLOOD PRESSURE: 80 MMHG | SYSTOLIC BLOOD PRESSURE: 118 MMHG | WEIGHT: 175.5 LBS

## 2023-11-29 DIAGNOSIS — Z98.890 POST-OPERATIVE STATE: Primary | ICD-10-CM

## 2023-11-29 PROCEDURE — 3044F HG A1C LEVEL LT 7.0%: CPT | Mod: CPTII,S$GLB,, | Performed by: OBSTETRICS & GYNECOLOGY

## 2023-11-29 PROCEDURE — 3079F DIAST BP 80-89 MM HG: CPT | Mod: CPTII,S$GLB,, | Performed by: OBSTETRICS & GYNECOLOGY

## 2023-11-29 PROCEDURE — 3079F PR MOST RECENT DIASTOLIC BLOOD PRESSURE 80-89 MM HG: ICD-10-PCS | Mod: CPTII,S$GLB,, | Performed by: OBSTETRICS & GYNECOLOGY

## 2023-11-29 PROCEDURE — 3074F PR MOST RECENT SYSTOLIC BLOOD PRESSURE < 130 MM HG: ICD-10-PCS | Mod: CPTII,S$GLB,, | Performed by: OBSTETRICS & GYNECOLOGY

## 2023-11-29 PROCEDURE — 99024 POSTOP FOLLOW-UP VISIT: CPT | Mod: S$GLB,,, | Performed by: OBSTETRICS & GYNECOLOGY

## 2023-11-29 PROCEDURE — 3074F SYST BP LT 130 MM HG: CPT | Mod: CPTII,S$GLB,, | Performed by: OBSTETRICS & GYNECOLOGY

## 2023-11-29 PROCEDURE — 99999 PR PBB SHADOW E&M-EST. PATIENT-LVL III: CPT | Mod: PBBFAC,,, | Performed by: OBSTETRICS & GYNECOLOGY

## 2023-11-29 PROCEDURE — 1159F PR MEDICATION LIST DOCUMENTED IN MEDICAL RECORD: ICD-10-PCS | Mod: CPTII,S$GLB,, | Performed by: OBSTETRICS & GYNECOLOGY

## 2023-11-29 PROCEDURE — 99999 PR PBB SHADOW E&M-EST. PATIENT-LVL III: ICD-10-PCS | Mod: PBBFAC,,, | Performed by: OBSTETRICS & GYNECOLOGY

## 2023-11-29 PROCEDURE — 99024 PR POST-OP FOLLOW-UP VISIT: ICD-10-PCS | Mod: S$GLB,,, | Performed by: OBSTETRICS & GYNECOLOGY

## 2023-11-29 PROCEDURE — 3044F PR MOST RECENT HEMOGLOBIN A1C LEVEL <7.0%: ICD-10-PCS | Mod: CPTII,S$GLB,, | Performed by: OBSTETRICS & GYNECOLOGY

## 2023-11-29 PROCEDURE — 1159F MED LIST DOCD IN RCRD: CPT | Mod: CPTII,S$GLB,, | Performed by: OBSTETRICS & GYNECOLOGY

## 2023-11-29 NOTE — PROGRESS NOTES
CC: Postoperative visit    Daniela Dillon is a 38 y.o. female  presents for a postoperative visit s/p novarsure endometrial ablation and lap BS on 23.  Her postoperative course was uncomplicated.  She is doing well postoperative.    Did have any bleeding with her last cycle      1. Bilateral fallopian tubes, salpingectomy:  Completely transected fallopian tubes with no diagnostic histopathologic abnormalities           ROS:  GENERAL: No fever, chills, fatigability or weight loss.  VULVAR: No pain, no lesions and no itching.  VAGINAL: No relaxation, no itching, no discharge, no abnormal bleeding and no lesions.  ABDOMEN: No abdominal pain. Denies nausea. Denies vomiting. No diarrhea. No constipation  BREAST: Denies pain. No lumps. No discharge.  URINARY: No incontinence, no nocturia, no frequency and no dysuria.  CARDIOVASCULAR: No chest pain. No shortness of breath. No leg cramps.  NEUROLOGICAL: No headaches. No vision changes.    Physical Exam  /80   Wt 79.6 kg (175 lb 7.8 oz)   BMI 29.20 kg/m²   The patient appears well, alert, oriented x 3, in no distress.  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

## 2023-11-30 ENCOUNTER — TELEPHONE (OUTPATIENT)
Dept: OBSTETRICS AND GYNECOLOGY | Facility: CLINIC | Age: 38
End: 2023-11-30
Payer: COMMERCIAL

## 2023-11-30 ENCOUNTER — PATIENT MESSAGE (OUTPATIENT)
Dept: OBSTETRICS AND GYNECOLOGY | Facility: CLINIC | Age: 38
End: 2023-11-30
Payer: COMMERCIAL

## 2023-11-30 NOTE — TELEPHONE ENCOUNTER
----- Message from Roberto Bradley sent at 11/30/2023  9:36 AM CST -----  Regarding: Rosita  Type: Patient Callback     Who called: Rosita     What is the request in detail: Pt stated that she has more forms for her job that needs to be filled out by Monday and she needs someone from the office to give her a call to get advice as to how to handle this.     Can the clinic reply by [facility]? Yes     Would the patient rather a call back or a response via [facility]? Callback     Best call back number: [phone]      Additional Information:

## 2023-11-30 NOTE — TELEPHONE ENCOUNTER
----- Message from Joyce Lock LPN sent at 11/30/2023 12:04 PM CST -----  Regarding: FW: Rosita    ----- Message -----  From: Roberto Bradley  Sent: 11/30/2023   9:38 AM CST  To: Kasandra Baez Staff  Subject: Rosita                                              Type: Patient Callback     Who called: Rosita     What is the request in detail: Pt stated that she has more forms for her job that needs to be filled out by Monday and she needs someone from the office to give her a call to get advice as to how to handle this.     Can the clinic reply by [facility]? Yes     Would the patient rather a call back or a response via [facility]? Callback     Best call back number: [phone]      Additional Information:

## 2023-12-04 ENCOUNTER — TELEPHONE (OUTPATIENT)
Dept: OBSTETRICS AND GYNECOLOGY | Facility: CLINIC | Age: 38
End: 2023-12-04
Payer: COMMERCIAL

## 2023-12-04 NOTE — TELEPHONE ENCOUNTER
Returned pt's call - advised message was sent to me and not to Dr. Slaughter. Pt planning to resend message to Dr. Slaughter.

## 2023-12-04 NOTE — TELEPHONE ENCOUNTER
----- Message from Yoko Oquendo sent at 12/4/2023  9:06 AM CST -----  Regarding: fmala paperwork / today  Name of Who is Calling:  BIBIANA HORTA [[id number]]        What is the request in detail: pt is calling bc her fmla pperwork is due today. She said that Dr Dykes forgot to fill out the 3rd page. She said that it is due today . She is asking if Dr Dykes can fill out the 3rd page and send it to her through the portal. Please assist asap as it is due today.           Can the clinic reply by [facility]:          [facility] Back [facility]: [phone]

## 2023-12-29 ENCOUNTER — PATIENT MESSAGE (OUTPATIENT)
Dept: FAMILY MEDICINE | Facility: CLINIC | Age: 38
End: 2023-12-29
Payer: COMMERCIAL

## 2023-12-29 DIAGNOSIS — J45.909 ASTHMA, UNSPECIFIED ASTHMA SEVERITY, UNSPECIFIED WHETHER COMPLICATED, UNSPECIFIED WHETHER PERSISTENT: Primary | ICD-10-CM

## 2024-02-05 ENCOUNTER — PATIENT MESSAGE (OUTPATIENT)
Dept: FAMILY MEDICINE | Facility: CLINIC | Age: 39
End: 2024-02-05
Payer: COMMERCIAL

## 2024-02-08 ENCOUNTER — PATIENT MESSAGE (OUTPATIENT)
Dept: SURGERY | Facility: HOSPITAL | Age: 39
End: 2024-02-08
Payer: COMMERCIAL

## 2024-02-15 ENCOUNTER — TELEPHONE (OUTPATIENT)
Dept: FAMILY MEDICINE | Facility: CLINIC | Age: 39
End: 2024-02-15
Payer: COMMERCIAL

## 2024-02-15 NOTE — TELEPHONE ENCOUNTER
Advised pt that an office visit is needed for vaccines. Pt declined scheduling, states she will schedule online.

## 2024-02-15 NOTE — TELEPHONE ENCOUNTER
----- Message from Ladi Terry sent at 2/15/2024  2:55 PM CST -----  Type: Patient Call Back    Who called:Self    What is the request in detail:In regards to vaccines     Can the clinic reply by [facility]?No    Would the patient rather a call back or a response via [facility]? Call    Best call back number:[phone] (home)       Additional Information:

## 2024-03-12 ENCOUNTER — OFFICE VISIT (OUTPATIENT)
Dept: FAMILY MEDICINE | Facility: CLINIC | Age: 39
End: 2024-03-12
Payer: COMMERCIAL

## 2024-03-12 VITALS
TEMPERATURE: 98 F | WEIGHT: 173.5 LBS | BODY MASS INDEX: 28.91 KG/M2 | OXYGEN SATURATION: 98 % | SYSTOLIC BLOOD PRESSURE: 110 MMHG | HEIGHT: 65 IN | DIASTOLIC BLOOD PRESSURE: 60 MMHG | RESPIRATION RATE: 16 BRPM | HEART RATE: 94 BPM

## 2024-03-12 DIAGNOSIS — Z02.0 SCHOOL PHYSICAL EXAM: Primary | ICD-10-CM

## 2024-03-12 DIAGNOSIS — Z00.00 ENCOUNTER FOR ANNUAL PHYSICAL EXAM: ICD-10-CM

## 2024-03-12 DIAGNOSIS — Z23 NEED FOR TETANUS, DIPHTHERIA, AND ACELLULAR PERTUSSIS (TDAP) VACCINE: ICD-10-CM

## 2024-03-12 PROCEDURE — 99999 PR PBB SHADOW E&M-EST. PATIENT-LVL IV: CPT | Mod: PBBFAC,,, | Performed by: INTERNAL MEDICINE

## 2024-03-12 PROCEDURE — 1160F RVW MEDS BY RX/DR IN RCRD: CPT | Mod: CPTII,S$GLB,, | Performed by: INTERNAL MEDICINE

## 2024-03-12 PROCEDURE — 90734 MENACWYD/MENACWYCRM VACC IM: CPT | Mod: S$GLB,,, | Performed by: INTERNAL MEDICINE

## 2024-03-12 PROCEDURE — 99396 PREV VISIT EST AGE 40-64: CPT | Mod: 25,S$GLB,, | Performed by: INTERNAL MEDICINE

## 2024-03-12 PROCEDURE — 3078F DIAST BP <80 MM HG: CPT | Mod: CPTII,S$GLB,, | Performed by: INTERNAL MEDICINE

## 2024-03-12 PROCEDURE — 1159F MED LIST DOCD IN RCRD: CPT | Mod: CPTII,S$GLB,, | Performed by: INTERNAL MEDICINE

## 2024-03-12 PROCEDURE — 86580 TB INTRADERMAL TEST: CPT | Mod: S$GLB,,, | Performed by: INTERNAL MEDICINE

## 2024-03-12 PROCEDURE — 90471 IMMUNIZATION ADMIN: CPT | Mod: S$GLB,,, | Performed by: INTERNAL MEDICINE

## 2024-03-12 PROCEDURE — 3074F SYST BP LT 130 MM HG: CPT | Mod: CPTII,S$GLB,, | Performed by: INTERNAL MEDICINE

## 2024-03-12 PROCEDURE — 3008F BODY MASS INDEX DOCD: CPT | Mod: CPTII,S$GLB,, | Performed by: INTERNAL MEDICINE

## 2024-03-12 NOTE — PROGRESS NOTES
Administered Menveo vaccine (1 vial dose) IM to left deltoid.  Patient tolerated injection well, no adverse reactions noted.   Administered PPD intradermally to right forearm, wheal noted.  Patient tolerated injection well, no adverse reactions noted. Patient advised on care of area of test.  Patient advised to return in 48 - 72 hours to have test read.  Patient verbalized understanding.

## 2024-03-12 NOTE — PROGRESS NOTES
Subjective:       Patient ID: Daniela Dillon is a pleasant 38 y.o. 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  She wants to do an annual physical at the same time.  Come with a form to fill in as well as the list of requested immunizations.  She states she would need several shots: Tdap, MMR, Varicella, Hep B, and, Meningococcal.  Also needed tb screening.    Patient Active Problem List   Diagnosis    Asthma    Request for sterilization    S/P endometrial ablation    Abnormal uterine bleeding (AUB)          ACTIVE MEDICAL ISSUES:  Documented in Problem List     PAST MEDICAL HISTORY  Documented     PAST SURGICAL HISTORY:  Documented     SOCIAL HISTORY:  Documented     FAMILY HISTORY:  Documented     ALLERGIES AND MEDICATIONS: updated and reviewed.  Documented    Review of Systems   Constitutional: Negative.  Negative for unexpected weight change.   HENT: Negative.     Respiratory: Negative.     Cardiovascular: Negative.    Gastrointestinal: Negative.    Genitourinary: Negative.    Musculoskeletal: Negative.    All other systems reviewed and are negative.      Objective:      Physical Exam  Vitals and nursing note reviewed.   Constitutional:       Appearance: Normal appearance.   HENT:      Right Ear: Tympanic membrane normal.      Left Ear: Tympanic membrane normal.   Eyes:      Conjunctiva/sclera: Conjunctivae normal.   Cardiovascular:      Rate and Rhythm: Normal rate and regular rhythm.      Pulses: Normal pulses.      Heart sounds: Normal heart sounds.   Pulmonary:      Effort: Pulmonary effort is normal.      Breath sounds: Normal breath sounds.   Abdominal:      General: Bowel sounds are normal.   Musculoskeletal:         General: Normal range of motion.   Skin:      "General: Skin is warm and dry.   Neurological:      General: No focal deficit present.      Mental Status: She is alert and oriented to person, place, and time.   Psychiatric:         Mood and Affect: Mood normal.         Behavior: Behavior normal.         Thought Content: Thought content normal.         Judgment: Judgment normal.         Vitals:    03/12/24 1534   BP: 110/60   BP Location: Left arm   Patient Position: Sitting   BP Method: Large (Manual)   Pulse: 94   Resp: 16   Temp: 98 °F (36.7 °C)   TempSrc: Oral   SpO2: 98%   Weight: 78.7 kg (173 lb 8 oz)   Height: 5' 5" (1.651 m)     Body mass index is 28.87 kg/m².    RESULTS: Reviewed labs from last 12 months    Last Lab Results:     Lab Results   Component Value Date    WBC 6.30 10/25/2023    HGB 11.0 (L) 10/25/2023    HCT 34.3 (L) 10/25/2023     10/25/2023     10/25/2023    K 4.0 10/25/2023     10/25/2023    CO2 22 (L) 10/25/2023    BUN 10 10/25/2023    CREATININE 0.9 10/25/2023    CALCIUM 8.9 10/25/2023    ALBUMIN 4.1 04/10/2023    AST 17 04/10/2023    ALT 12 04/10/2023    CHOL 157 04/10/2023    TRIG 63 04/10/2023    HDL 45 04/10/2023    LDLCALC 99.4 04/10/2023    HGBA1C 5.6 04/10/2023    TSH 3.221 04/10/2023       Assessment:       1. School physical exam    2. Encounter for annual physical exam    3. BMI 28.0-28.9,adult    4. Need for tetanus, diphtheria, and acellular pertussis (Tdap) vaccine        Plan:   Daniela was seen today for follow-up.    Diagnoses and all orders for this visit:    School physical exam  -     Rubella antibody, IgG; Future  -     Rubeola antibody IgG; Future  -     Mumps, IgG Screen; Future  -     Hepatitis B Surface Antibody, Qual/Quant; Future  -     Varicella zoster antibody, IgG; Future  -     (In Office Administered) Meningococcal Conjugate - MCV4O (MENVEO) 1 VIAL Ages 10 Years-55 Years; Future  -     POCT TB Skin Test  -     (In Office Administered) Meningococcal Conjugate - MCV4O (MENVEO) 1 VIAL Ages 10 " Years-55 Years    See HPI, blood work with titers, updated immunizations and will do tb testing.    Encounter for annual physical exam  -     CBC Auto Differential; Future  -     Comprehensive Metabolic Panel; Future  -     Hemoglobin A1C; Future  -     TSH; Future  -     Lipid Panel; Future    Usual blood work, discussed preventative measures and lifestyle.    BMI 28.0-28.9,adult    Need for tetanus, diphtheria, and acellular pertussis (Tdap) vaccine  -     (In Office Administered) Tdap Vaccine      No follow-ups on file.    This note was created by combination of typed  and M-Modal dictation.  Transcription errors may be present.  If there are any questions, please contact me.

## 2024-03-13 ENCOUNTER — PATIENT MESSAGE (OUTPATIENT)
Dept: FAMILY MEDICINE | Facility: CLINIC | Age: 39
End: 2024-03-13
Payer: COMMERCIAL

## 2024-03-15 ENCOUNTER — CLINICAL SUPPORT (OUTPATIENT)
Dept: FAMILY MEDICINE | Facility: CLINIC | Age: 39
End: 2024-03-15
Payer: COMMERCIAL

## 2024-03-15 ENCOUNTER — LAB VISIT (OUTPATIENT)
Dept: LAB | Facility: HOSPITAL | Age: 39
End: 2024-03-15
Attending: INTERNAL MEDICINE
Payer: COMMERCIAL

## 2024-03-15 DIAGNOSIS — Z11.1 SCREENING-PULMONARY TB: Primary | ICD-10-CM

## 2024-03-15 DIAGNOSIS — Z00.00 ENCOUNTER FOR ANNUAL PHYSICAL EXAM: ICD-10-CM

## 2024-03-15 DIAGNOSIS — Z02.0 SCHOOL PHYSICAL EXAM: ICD-10-CM

## 2024-03-15 LAB
ALBUMIN SERPL BCP-MCNC: 4 G/DL (ref 3.5–5.2)
ALP SERPL-CCNC: 80 U/L (ref 55–135)
ALT SERPL W/O P-5'-P-CCNC: 21 U/L (ref 10–44)
ANION GAP SERPL CALC-SCNC: 6 MMOL/L (ref 8–16)
AST SERPL-CCNC: 21 U/L (ref 10–40)
BASOPHILS # BLD AUTO: 0.03 K/UL (ref 0–0.2)
BASOPHILS NFR BLD: 0.7 % (ref 0–1.9)
BILIRUB SERPL-MCNC: 0.6 MG/DL (ref 0.1–1)
BUN SERPL-MCNC: 12 MG/DL (ref 6–20)
CALCIUM SERPL-MCNC: 9.2 MG/DL (ref 8.7–10.5)
CHLORIDE SERPL-SCNC: 110 MMOL/L (ref 95–110)
CHOLEST SERPL-MCNC: 176 MG/DL (ref 120–199)
CHOLEST/HDLC SERPL: 4.2 {RATIO} (ref 2–5)
CO2 SERPL-SCNC: 22 MMOL/L (ref 23–29)
CREAT SERPL-MCNC: 0.9 MG/DL (ref 0.5–1.4)
DIFFERENTIAL METHOD BLD: ABNORMAL
EOSINOPHIL # BLD AUTO: 0.3 K/UL (ref 0–0.5)
EOSINOPHIL NFR BLD: 5.9 % (ref 0–8)
ERYTHROCYTE [DISTWIDTH] IN BLOOD BY AUTOMATED COUNT: 14 % (ref 11.5–14.5)
EST. GFR  (NO RACE VARIABLE): >60 ML/MIN/1.73 M^2
ESTIMATED AVG GLUCOSE: 114 MG/DL (ref 68–131)
GLUCOSE SERPL-MCNC: 100 MG/DL (ref 70–110)
HBA1C MFR BLD: 5.6 % (ref 4–5.6)
HCT VFR BLD AUTO: 36.4 % (ref 37–48.5)
HDLC SERPL-MCNC: 42 MG/DL (ref 40–75)
HDLC SERPL: 23.9 % (ref 20–50)
HGB BLD-MCNC: 11.3 G/DL (ref 12–16)
IMM GRANULOCYTES # BLD AUTO: 0.01 K/UL (ref 0–0.04)
IMM GRANULOCYTES NFR BLD AUTO: 0.2 % (ref 0–0.5)
LDLC SERPL CALC-MCNC: 124.6 MG/DL (ref 63–159)
LYMPHOCYTES # BLD AUTO: 1.4 K/UL (ref 1–4.8)
LYMPHOCYTES NFR BLD: 29.5 % (ref 18–48)
MCH RBC QN AUTO: 27.6 PG (ref 27–31)
MCHC RBC AUTO-ENTMCNC: 31 G/DL (ref 32–36)
MCV RBC AUTO: 89 FL (ref 82–98)
MONOCYTES # BLD AUTO: 0.4 K/UL (ref 0.3–1)
MONOCYTES NFR BLD: 9.1 % (ref 4–15)
NEUTROPHILS # BLD AUTO: 2.5 K/UL (ref 1.8–7.7)
NEUTROPHILS NFR BLD: 54.6 % (ref 38–73)
NONHDLC SERPL-MCNC: 134 MG/DL
NRBC BLD-RTO: 0 /100 WBC
PLATELET # BLD AUTO: 193 K/UL (ref 150–450)
PMV BLD AUTO: 12.5 FL (ref 9.2–12.9)
POTASSIUM SERPL-SCNC: 4.4 MMOL/L (ref 3.5–5.1)
PROT SERPL-MCNC: 6.6 G/DL (ref 6–8.4)
RBC # BLD AUTO: 4.09 M/UL (ref 4–5.4)
SODIUM SERPL-SCNC: 138 MMOL/L (ref 136–145)
TB INDURATION - 48 HR READ: NORMAL
TB INDURATION - 72 HR READ: 0 MM
TB SKIN TEST - 48 HR READ: NORMAL
TB SKIN TEST - 72 HR READ: NEGATIVE
TRIGL SERPL-MCNC: 47 MG/DL (ref 30–150)
TSH SERPL DL<=0.005 MIU/L-ACNC: 1.95 UIU/ML (ref 0.4–4)
WBC # BLD AUTO: 4.61 K/UL (ref 3.9–12.7)

## 2024-03-15 PROCEDURE — 86735 MUMPS ANTIBODY: CPT | Performed by: INTERNAL MEDICINE

## 2024-03-15 PROCEDURE — 86762 RUBELLA ANTIBODY: CPT | Performed by: INTERNAL MEDICINE

## 2024-03-15 PROCEDURE — 80061 LIPID PANEL: CPT | Performed by: INTERNAL MEDICINE

## 2024-03-15 PROCEDURE — 86706 HEP B SURFACE ANTIBODY: CPT | Performed by: INTERNAL MEDICINE

## 2024-03-15 PROCEDURE — 84443 ASSAY THYROID STIM HORMONE: CPT | Performed by: INTERNAL MEDICINE

## 2024-03-15 PROCEDURE — 99999 PR PBB SHADOW E&M-EST. PATIENT-LVL I: CPT | Mod: PBBFAC,,,

## 2024-03-15 PROCEDURE — 36415 COLL VENOUS BLD VENIPUNCTURE: CPT | Mod: PO | Performed by: INTERNAL MEDICINE

## 2024-03-15 PROCEDURE — 85025 COMPLETE CBC W/AUTO DIFF WBC: CPT | Performed by: INTERNAL MEDICINE

## 2024-03-15 PROCEDURE — 86787 VARICELLA-ZOSTER ANTIBODY: CPT | Performed by: INTERNAL MEDICINE

## 2024-03-15 PROCEDURE — 83036 HEMOGLOBIN GLYCOSYLATED A1C: CPT | Performed by: INTERNAL MEDICINE

## 2024-03-15 PROCEDURE — 86765 RUBEOLA ANTIBODY: CPT | Performed by: INTERNAL MEDICINE

## 2024-03-15 PROCEDURE — 80053 COMPREHEN METABOLIC PANEL: CPT | Performed by: INTERNAL MEDICINE

## 2024-03-18 LAB
HBV SURFACE AB SER QL IA: NEGATIVE
HBV SURFACE AB SERPL IA-ACNC: <3 MIU/ML
MUMPS IGG INTERPRETATION: POSITIVE
MUMPS IGG SCREEN: 48.5 AU/ML
RUBEOLA IGG ANTIBODY: 87 AU/ML
RUBEOLA INTERPRETATION: POSITIVE
RUBV IGG SER-ACNC: 32.5 IU/ML
RUBV IGG SER-IMP: REACTIVE
VARICELLA INTERPRETATION: POSITIVE
VARICELLA ZOSTER IGG: 519.3 AU/ML

## 2024-03-29 NOTE — PROGRESS NOTES
Cc:  blood in urine    HPI:  38 yr  who is up to date with routine gyn care presents with a 1 day hx of bleeding with urination.  Pt has noticed passage of blood clots when urinating for 1 day.    UA:  2+ LE, 3+ blood, 2+ protein    Vitals:    10/16/23 1038   BP: 112/64   Weight: 80.5 kg (177 lb 7.5 oz)     Physical Exam:   Constitutional: She is oriented to person, place, and time. She appears well-developed and well-nourished. No distress.    HENT:   Head: Normocephalic and atraumatic.     Neck: No thyromegaly present.     Pulmonary/Chest: Effort normal. No respiratory distress.        Abdominal: Soft. She exhibits no distension and no mass. There is no abdominal tenderness. There is no rebound and no guarding.             Musculoskeletal: Normal range of motion and moves all extremeties. No tenderness.       Neurological: She is alert and oriented to person, place, and time. No cranial nerve deficit. Coordination normal.    Skin: Skin is warm. She is not diaphoretic.    Psychiatric: She has a normal mood and affect. Her behavior is normal. Judgment and thought content normal.     Assessment/Plan  1. Hematuria due to acute cystitis  Urine culture    POCT URINE DIPSTICK WITHOUT MICROSCOPE    ciprofloxacin HCl (CIPRO) 500 MG tablet        Pt to f/u with Dr. Slaughter to discuss endometrial ablation  Note given for day off tomorrow and need to use restroom at least every 2 hours.   OT eval and treat OT eval and treat

## 2024-04-01 ENCOUNTER — PATIENT MESSAGE (OUTPATIENT)
Dept: FAMILY MEDICINE | Facility: CLINIC | Age: 39
End: 2024-04-01
Payer: COMMERCIAL

## 2024-04-01 DIAGNOSIS — Z23 HEPATITIS B VACCINATION ADMINISTERED AT CURRENT VISIT: Primary | ICD-10-CM

## 2024-04-03 ENCOUNTER — CLINICAL SUPPORT (OUTPATIENT)
Dept: FAMILY MEDICINE | Facility: CLINIC | Age: 39
End: 2024-04-03
Payer: COMMERCIAL

## 2024-04-03 ENCOUNTER — TELEPHONE (OUTPATIENT)
Dept: FAMILY MEDICINE | Facility: CLINIC | Age: 39
End: 2024-04-03

## 2024-04-03 DIAGNOSIS — Z23 NEED FOR TETANUS, DIPHTHERIA, AND ACELLULAR PERTUSSIS (TDAP) VACCINE: Primary | ICD-10-CM

## 2024-04-03 DIAGNOSIS — Z23 HEPATITIS B VACCINATION ADMINISTERED AT CURRENT VISIT: Primary | ICD-10-CM

## 2024-04-03 DIAGNOSIS — Z92.29 HAS RECEIVED FIRST DOSE OF HEPATITIS B VACCINE: ICD-10-CM

## 2024-04-03 PROCEDURE — 90715 TDAP VACCINE 7 YRS/> IM: CPT | Mod: S$GLB,,, | Performed by: INTERNAL MEDICINE

## 2024-04-03 PROCEDURE — 99999 PR PBB SHADOW E&M-EST. PATIENT-LVL I: CPT | Mod: PBBFAC,,,

## 2024-04-03 PROCEDURE — 90739 HEPB VACC 2/4 DOSE ADULT IM: CPT | Mod: S$GLB,,, | Performed by: INTERNAL MEDICINE

## 2024-04-03 PROCEDURE — 90471 IMMUNIZATION ADMIN: CPT | Mod: S$GLB,,, | Performed by: INTERNAL MEDICINE

## 2024-04-03 PROCEDURE — 90472 IMMUNIZATION ADMIN EACH ADD: CPT | Mod: S$GLB,,, | Performed by: INTERNAL MEDICINE

## 2024-04-17 ENCOUNTER — PATIENT MESSAGE (OUTPATIENT)
Dept: FAMILY MEDICINE | Facility: CLINIC | Age: 39
End: 2024-04-17
Payer: COMMERCIAL

## 2024-04-17 DIAGNOSIS — Z11.1 SCREENING-PULMONARY TB: Primary | ICD-10-CM

## 2024-04-19 DIAGNOSIS — Z11.1 VISIT FOR TB SKIN TEST: Primary | ICD-10-CM

## 2024-04-22 ENCOUNTER — CLINICAL SUPPORT (OUTPATIENT)
Dept: FAMILY MEDICINE | Facility: CLINIC | Age: 39
End: 2024-04-22
Payer: COMMERCIAL

## 2024-04-22 DIAGNOSIS — Z11.1 ENCOUNTER FOR SCREENING FOR RESPIRATORY TUBERCULOSIS: Primary | ICD-10-CM

## 2024-04-22 PROCEDURE — 99999 PR PBB SHADOW E&M-EST. PATIENT-LVL I: CPT | Mod: PBBFAC,,,

## 2024-04-22 PROCEDURE — 86580 TB INTRADERMAL TEST: CPT | Mod: S$GLB,,, | Performed by: INTERNAL MEDICINE

## 2024-04-22 NOTE — PROGRESS NOTES
Internal Medicine Discharge Note     Vinicio Lacy    Admission Date :  8/13/2018     Discharge Date :   8/20/18    Admission Physician:   Cliff Mullen MD    Discharge Physician:   Gen Castillo MD   [facility]   [phone]       Reason for Admission :   Abdominal pain     Admission Diagnosis:  Acute cholecystitis [K81.0]    Discharge Diagnosis:   1. Acute cholecystitis status post lap to open cholecystectomy (8/15) due presence of adhesions   2. Biliary Leak status post ERCP with biliary stent (plastic) placement 8/19/18  3. expected Post Op Ileus ; resolved   4. Mild atelectasis noted on XR  5. Type 2 Diabetes mellitus : updated A1c 8.1    6. History of paroxysmal Atrial fibrillation; May resume xarelto by Wed 8/22       Hospital Course:   This is a pleasant gentleman with prior history of Afib controlled with BB and CCB on Xarelto for stroke prevention who presented with abdominal pain. Evaluation revealed cholecystitis. He was promptly assessed by surgery and proceeded with laparoscopic cholecystectomy which was converted to open due to presence of significant adhesions (please refer to Op note). Incision was closed with Prevena VAC and drain was in placed. He had mild post op fever which was initially thought sec to mild atelectasis noted on XR but subsequently was diagnosed with bile leak. After short course of zosyn and GI consult for ERCP with plastic stent placement, fever and leak resolved. He had expected post op ileus which has resolved. As his condition starting to show significant strides to improvement, was felt proper to transition home.     He is to follow up with general surgery clinic for drain removal on 8/23/18 at 11am. GI follow up is to be set up for 10-12 weeks for removal of biliary stent. Due to the ERCP intervention, has been advised to hold his xarelto until 8/22/18.         Consults :   IP Consult Orders     Start     Ordered    08/18/18 0712  Inpatient consult to GI  ONE  PPD administered to left mid forearm without difficulty; wheal noted; pt informed to return in 48-72 hours to have test read     TIME     Provider:  Kaleb Zavaleta MD    08/18/18 0713    08/13/18 2302  Inpatient consult to General Surgery  ONE TIME     Provider:  Jey Zacarias MD    08/13/18 2301            Imaging results :   Xr Abdomen Ap Kub    Result Date: 8/17/2018  XR ABDOMEN 1 VW KUB SUPINE HISTORY: Concern for postop ileus. Abdominal bloating/distention. COMPARISON: Radiographs 8/8/2016. CT abdomen pelvis 8/12/2018. FINDINGS: Numerous loops of prominent air-filled small and large bowel, increased in prominence from prior radiograph 8/18/2018. No definite free air on this limited supine view. Small colonic stool load. Drain projecting over the right upper quadrant. Midline cutaneous staples. Changes of lumbar spinal fusion. Hazy bibasilar airspace opacities. Degenerative changes in the spine and hips.     IMPRESSION: 1. Numerous loops of prominent air-filled small and large bowel, increased from prior examination, possibly postoperative ileus. Close attention on follow-up is recommended. 2. Hazy bibasilar airspace opacities, new from prior.     Xr Abdomen 2 Vw    Result Date: 8/8/2018  EXAM: XR ABDOMEN 2 VW CLINICAL HISTORY: epigastric pain, history of ulcer, history of gastrectomy COMPARISON:None. FINDINGS: The two-view abdomen series demonstrates the included lower chest to be unremarkable. Postsurgical changes involve the lumbar spine from fusion. Degenerative changes involve the lumbar spine. Surgical clips overlie the right pelvis and inguinal region. Nondilated air containing loops of small bowel and colon are present within the upper abdomen. A few nondescript air-fluid levels are present. No gross free air or evidence for obstruction. No abnormal masses or calcifications are present.     Fl Pacs Record Reportable    Result Date: 8/19/2018  FL FLUOROSCOPY ERCP + REPORT HISTORY: Pain, status post 8/15/2018 open cholangiogram.    COMPARISONS:  None.     FINDINGS/IMPRESSION:  7 intraoperative spot views are submitted  during an ERCP which included an occlusion cholangiogram and stent placement. The initial image demonstrates the ERCP scope in place. Subsequent images demonstrate cannulation of the bile duct with subsequent injection of contrast. An occlusion cholangiogram demonstrates no distinct intraluminal filling defect other than the balloon and catheter. Contrast fills the cystic duct, which is distended proximally, and there is evidence of a trace moderate extravasation at the cystic duct stump. The final image demonstrates deployment of a distal common bile duct stent, and the ERCP scope has been removed. The still opacified biliary tree is decompressed. Please review the operative note for further details.       Physical Exam (on date of discharge) :   Blood pressure 118/80, pulse 86, temperature 97.5 °F (36.4 °C), temperature source Oral, resp. rate 20, height 6' 1\" (1.854 m), weight 99.2 kg, SpO2 98 %.  Gen: well developed and nourished ; cooperative. No acute complaints   HEENT moist oral mucosa ; no icterus ; EOMI / PERRLA   Cardiac: S1S2 no GR; regular   Lungs: clear to auscultation with good air movement at this time   Abdomen: soft Not Tender nor distended ; no palpable HSM ; Drain to R Abd with very scant bilious output (residual). Midline incision is well approximated without evidence of acute inflammation   Extremities: no edema of extremities; FROM x 4  Skin: intact  Except healing incision   Neuro: alert, oriented to person/place and date ; speech non dysarthric ; CN intact ; no focal motor nor sensory deficits        Disposition : home with family support     Recommendations :     LAB WORK : as needed    DIET : regular ; carb consistent   Wound Care : as per post surgery instructions in after visit summary     Follow Up appointments :   8/23/2018 11:00 AM [facility] [address] [facility] Surgery [address]     9/26/2018 11:20 AM Tuan Meyer MD ob Orthopaedics       -- Please refer to discharge  instructions as provided in After Visit Summary (AVS) --    MEDICATION REC - Please refer to AVS for details.       LACE(10 or more):High            5 LACE Length of Stay    3 LACE Acute Admission    2 LACE Charlson Comorbidity Index Evalution        Criteria that do not apply:    LACE Visits to ED Previous 6 Months            Time dedicated to medication reconciliation, education and other facets of discharge planning : 36 minutes

## 2024-04-24 ENCOUNTER — CLINICAL SUPPORT (OUTPATIENT)
Dept: FAMILY MEDICINE | Facility: CLINIC | Age: 39
End: 2024-04-24
Payer: COMMERCIAL

## 2024-04-24 DIAGNOSIS — Z11.1 VISIT FOR TB SKIN TEST: Primary | ICD-10-CM

## 2024-04-24 LAB
TB INDURATION - 48 HR READ: 5 MM
TB INDURATION - 72 HR READ: NORMAL
TB SKIN TEST - 48 HR READ: NEGATIVE
TB SKIN TEST - 72 HR READ: NORMAL

## 2024-04-24 PROCEDURE — 99999 PR PBB SHADOW E&M-EST. PATIENT-LVL I: CPT | Mod: PBBFAC,,,

## 2024-05-03 ENCOUNTER — CLINICAL SUPPORT (OUTPATIENT)
Dept: FAMILY MEDICINE | Facility: CLINIC | Age: 39
End: 2024-05-03
Payer: COMMERCIAL

## 2024-05-03 DIAGNOSIS — Z23 HEPATITIS B VACCINATION ADMINISTERED AT CURRENT VISIT: Primary | ICD-10-CM

## 2024-05-03 PROCEDURE — 90739 HEPB VACC 2/4 DOSE ADULT IM: CPT | Mod: S$GLB,,, | Performed by: INTERNAL MEDICINE

## 2024-05-03 PROCEDURE — 99999 PR PBB SHADOW E&M-EST. PATIENT-LVL I: CPT | Mod: PBBFAC,,,

## 2024-05-03 PROCEDURE — 90471 IMMUNIZATION ADMIN: CPT | Mod: S$GLB,,, | Performed by: INTERNAL MEDICINE

## 2024-05-30 ENCOUNTER — TELEPHONE (OUTPATIENT)
Dept: FAMILY MEDICINE | Facility: CLINIC | Age: 39
End: 2024-05-30
Payer: COMMERCIAL

## 2024-05-30 DIAGNOSIS — R41.840 CONCENTRATION DEFICIT: Primary | ICD-10-CM

## 2024-05-30 DIAGNOSIS — L92.2 GRANULOMA FACIALE: ICD-10-CM

## 2024-05-30 NOTE — TELEPHONE ENCOUNTER
----- Message from Ismael Suárez sent at 5/30/2024  9:07 AM CDT -----  Regarding: self  Type:  Patient Returning Call    Who Called:self    Who Left Message for Patient: Radha Guzman LPN    Does the patient know what this is regarding?:no    Would the patient rather a call back or a response via [facility]?callback    Best Call Back Number:[phone]    Additional Information:

## 2024-05-30 NOTE — TELEPHONE ENCOUNTER
----- Message from Melvi Navarro MD sent at 5/29/2024  5:22 PM CDT -----  Regarding: reason of the visit  Please tell pt that I do not assess or treat for ADHD. I am more than happy to refer her to Psychiatry to be tested of course. Thanks

## 2024-05-30 NOTE — TELEPHONE ENCOUNTER
Advised pt that you do not evaluate for ADHD. Patient is requesting a referral for ADHD and a dermatology referral for a granuloma on her ear.

## 2024-06-07 ENCOUNTER — HOSPITAL ENCOUNTER (OUTPATIENT)
Dept: RADIOLOGY | Facility: HOSPITAL | Age: 39
Discharge: HOME | End: 2024-06-07
Payer: COMMERCIAL

## 2024-06-07 DIAGNOSIS — Z12.31 BREAST CANCER SCREENING BY MAMMOGRAM: ICD-10-CM

## 2024-06-07 PROCEDURE — 77063 BREAST TOMOSYNTHESIS BI: CPT | Mod: TC

## 2024-06-07 PROCEDURE — 77063 BREAST TOMOSYNTHESIS BI: CPT | Mod: 26,,, | Performed by: RADIOLOGY

## 2024-06-07 PROCEDURE — 77067 SCR MAMMO BI INCL CAD: CPT | Mod: 26,,, | Performed by: RADIOLOGY

## 2024-06-07 PROCEDURE — 77067 SCR MAMMO BI INCL CAD: CPT | Mod: TC

## 2024-06-21 ENCOUNTER — PATIENT MESSAGE (OUTPATIENT)
Dept: DERMATOLOGY | Facility: CLINIC | Age: 39
End: 2024-06-21
Payer: COMMERCIAL

## 2024-06-25 ENCOUNTER — PATIENT MESSAGE (OUTPATIENT)
Dept: DERMATOLOGY | Facility: CLINIC | Age: 39
End: 2024-06-25
Payer: COMMERCIAL

## 2024-06-27 ENCOUNTER — PATIENT MESSAGE (OUTPATIENT)
Dept: DERMATOLOGY | Facility: CLINIC | Age: 39
End: 2024-06-27
Payer: COMMERCIAL

## 2024-06-28 ENCOUNTER — OFFICE VISIT (OUTPATIENT)
Dept: DERMATOLOGY | Facility: CLINIC | Age: 39
End: 2024-06-28
Payer: COMMERCIAL

## 2024-06-28 DIAGNOSIS — L91.0 KELOID: Primary | ICD-10-CM

## 2024-06-28 DIAGNOSIS — L81.9 HYPOPIGMENTATION: ICD-10-CM

## 2024-06-28 PROCEDURE — 1159F MED LIST DOCD IN RCRD: CPT | Mod: CPTII,95,, | Performed by: DERMATOLOGY

## 2024-06-28 PROCEDURE — 3044F HG A1C LEVEL LT 7.0%: CPT | Mod: CPTII,95,, | Performed by: DERMATOLOGY

## 2024-06-28 PROCEDURE — 1160F RVW MEDS BY RX/DR IN RCRD: CPT | Mod: CPTII,95,, | Performed by: DERMATOLOGY

## 2024-06-28 PROCEDURE — 99204 OFFICE O/P NEW MOD 45 MIN: CPT | Mod: 95,,, | Performed by: DERMATOLOGY

## 2024-06-28 RX ORDER — CLOBETASOL PROPIONATE 0.5 MG/G
CREAM TOPICAL 2 TIMES DAILY
Qty: 15 G | Refills: 1 | Status: SHIPPED | OUTPATIENT
Start: 2024-06-28

## 2024-06-28 NOTE — PROGRESS NOTES
The patient location is: [place]  The chief complaint leading to consultation is: rash    Visit type: Audiovisual    Face to Face time with patient: 15   minutes of total time spent on the encounter, which includes face to face time and non-face to face time preparing to see the patient (eg, review of tests), Obtaining and/or reviewing separately obtained history, Documenting clinical information in the electronic or other health record, Independently interpreting results (not separately reported) and communicating results to the patient/family/caregiver, or Care coordination (not separately reported).     Each patient to whom he or she provides medical services by telemedicine is:  (1) informed of the relationship between the physician and patient and the respective role of any other health care provider with respect to management of the patient; and (2) notified that he or she may decline to receive medical services by telemedicine and may withdraw from such care at any time.    Subjective:       Patient ID:  Daniela Dillon is a 39 y.o. female who presents for No chief complaint on file.    HPI  40 yo female here for 2 reasons. First is scar on back of left ear where she had a piercing. Area is firm and healed with a bump. Would like to discuss tx options.  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 It is in lines of white. She is noticing more on her back and face.  Has not had this biopsied in the past. No symptoms to the areas.    Review of Systems     [facility] Evisit Qnr    6/27/2024  3:24 PM CDT - Filed by Patient   How would you describe the skin condition you are experiencing? Other   You selected Other.  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 Recurring  problem   Where is this skin condition located? Face;  Forehead;  Left ear;  Torso;  Abdomen;  Back   When did you first notice your skin condition?  1 year or more ago   What symptoms are you experiencing with your skin condition?  Crusting;  Growing;  Inflamed;  Scabs;  Spreading;  Swelling;  Tender   How severe are your symptoms? Moderate to severe   What factors make your skin condition worse? Friction;  Pressure;  Stress   What treatments have you tried? Nothing;  Antihistamines   What was the outcome of this treatment?  Mild relief   What was the outcome of your antihistamine treatment?  No relief   Since you first noticed your skin condition, how has it changed?  It has gradually gotten worse   Have you been exposed to any of the following recently? None   Are you experiencing any additional symptoms? None   Do you have any history of Basal Cell Carcinoma (BCC) or Squamous Cell Carcinoma (SCC)?  No   Do you have any history of melanoma? No   Do you have any history of other skin disease?  Yes   Approximate date noted:  Eczema   Have you had prior use of Accutane?  No   Do you have any history of fever blisters?  No   Do you have any history of the following conditions? Allergies;  Asthma   Do you have a family history of any of the following medical conditions? Eczema   Are you pregnant or think you might be?  No   Are you currently breastfeeding?  No   If needed, please use the field below to go into more detail about your condition.     At least one photo is required for the MD to provide treatment. You can upload a maximum of three photos of the affected area.           Objective:    Physical Exam       Diagram Legend     Erythematous scaling macule/papule c/w actinic keratosis       Vascular papule c/w angioma      Pigmented verrucoid papule/plaque c/w seborrheic keratosis      Yellow umbilicated papule c/w sebaceous hyperplasia      Irregularly shaped tan macule c/w lentigo     1-2 mm smooth white  papules consistent with Milia      Movable subcutaneous cyst with punctum c/w epidermal inclusion cyst      Subcutaneous movable cyst c/w pilar cyst      Firm pink to brown papule c/w dermatofibroma      Pedunculated fleshy papule(s) c/w skin tag(s)      Evenly pigmented macule c/w junctional nevus     Mildly variegated pigmented, slightly irregular-bordered macule c/w mildly atypical nevus      Flesh colored to evenly pigmented papule c/w intradermal nevus       Pink pearly papule/plaque c/w basal cell carcinoma      Erythematous hyperkeratotic cursted plaque c/w SCC      Surgical scar with no sign of skin cancer recurrence      Open and closed comedones      Inflammatory papules and pustules      Verrucoid papule consistent consistent with wart     Erythematous eczematous patches and plaques     Dystrophic onycholytic nail with subungual debris c/w onychomycosis     Umbilicated papule    Erythematous-base heme-crusted tan verrucoid plaque consistent with inflamed seborrheic keratosis     Erythematous Silvery Scaling Plaque c/w Psoriasis     See annotation              Assessment / Plan:        Keloid  -     clobetasoL (TEMOVATE) 0.05 % cream; Apply topically 2 (two) times daily. To bump on back of ear  Dispense: 15 g; Refill: 1    Hypopigmentation  -     Ambulatory referral/consult to Dermatology         For Ear keloid reviewed topical therapy vs ILK injection in clinic.  Sent clobetasol to start.      For rash on abdomen and back unclear etiology but does not appear to be consistent with lichen striatus from these photographs. I would like for her to set up a visit at her convenience to evaluate this in person and possibly biopsy.      No follow-ups on file. PRN

## 2024-07-15 ENCOUNTER — OFFICE VISIT (OUTPATIENT)
Dept: DERMATOLOGY | Facility: CLINIC | Age: 39
End: 2024-07-15
Payer: COMMERCIAL

## 2024-07-15 DIAGNOSIS — L91.0 KELOID: ICD-10-CM

## 2024-07-15 DIAGNOSIS — L80 VITILIGO: Primary | ICD-10-CM

## 2024-07-15 PROCEDURE — 99214 OFFICE O/P EST MOD 30 MIN: CPT | Mod: 25,S$GLB,, | Performed by: DERMATOLOGY

## 2024-07-15 PROCEDURE — 3044F HG A1C LEVEL LT 7.0%: CPT | Mod: CPTII,S$GLB,, | Performed by: DERMATOLOGY

## 2024-07-15 PROCEDURE — 99999 PR PBB SHADOW E&M-EST. PATIENT-LVL II: CPT | Mod: PBBFAC,,, | Performed by: DERMATOLOGY

## 2024-07-15 PROCEDURE — 11900 INJECT SKIN LESIONS </W 7: CPT | Mod: S$GLB,,, | Performed by: DERMATOLOGY

## 2024-07-15 NOTE — PROGRESS NOTES
I have seen the patient, reviewed the Resident's progress note. I have personally interviewed and examined the patient at bedside and agree with the findings.     Cari Ramsey MD  [facility]

## 2024-07-15 NOTE — PROGRESS NOTES
Subjective:      Patient ID:  Daniela Dillon is a 39 y.o. female who presents for No chief complaint on file.    DAYLIN Becker is a 38 yo F presenting for multiple concerns. She was seen for a televisit 6/2024 and it was recommended she be evaluated in person.     She has a bump on the left ear that appeared after piercing. She has a history of keloid to a scar on the neck that was treated with injections.    Also has depigmented spots on the abdomen, back, and face. They started on the abdomen in a straight line. They appeared during her first pregnancy. They have never been symptomatic. She then developed them on her back but is not sure when. Most recently she has noticed them on her face. She has a history eczema, allergies, and asthma. Her mother has a history of non-hodgkin's lymphoma and thyroid disease.     Review of Systems   Constitutional: Negative.        Objective:   Physical Exam   Constitutional: She appears well-developed and well-nourished.   Skin:                    Diagram Legend     Erythematous scaling macule/papule c/w actinic keratosis       Vascular papule c/w angioma      Pigmented verrucoid papule/plaque c/w seborrheic keratosis      Yellow umbilicated papule c/w sebaceous hyperplasia      Irregularly shaped tan macule c/w lentigo     1-2 mm smooth white papules consistent with Milia      Movable subcutaneous cyst with punctum c/w epidermal inclusion cyst      Subcutaneous movable cyst c/w pilar cyst      Firm pink to brown papule c/w dermatofibroma      Pedunculated fleshy papule(s) c/w skin tag(s)      Evenly pigmented macule c/w junctional nevus     Mildly variegated pigmented, slightly irregular-bordered macule c/w mildly atypical nevus      Flesh colored to evenly pigmented papule c/w intradermal nevus       Pink pearly papule/plaque c/w basal cell carcinoma      Erythematous hyperkeratotic cursted plaque c/w SCC      Surgical scar with no sign of skin cancer recurrence      Open and  closed comedones      Inflammatory papules and pustules      Verrucoid papule consistent consistent with wart     Erythematous eczematous patches and plaques     Dystrophic onycholytic nail with subungual debris c/w onychomycosis     Umbilicated papule    Erythematous-base heme-crusted tan verrucoid plaque consistent with inflamed seborrheic keratosis     Erythematous Silvery Scaling Plaque c/w Psoriasis     See annotation                Assessment / Plan:        Vitiligo  History and exam most consistent with segmental vitiligo. History of autoimmune thyroid disease in her mother.  No palpability as would be seen in lichen striatus although both are Blaschkloid in nature  -   Start  ruxolitinib 1.5 % Crea; Apply to hypopigmented spots daily  Dispense: 60 g; Refill: 5  Photos taken today to assess response    Keloid  Left ear 2/2 piercing.   -ILK 10 mg/mL injection today    Intralesional Kenalog 10 mg/cc (0.1 cc total) injected into 1 lesions on the left ear today after obtaining verbal consent including risk of surrounding hypopigmentation. Patient tolerated procedure well.    Units: 0.1  NDC for Kenalog 10mg/cc:  5098-6958-96           RTC 3 mo

## 2024-10-15 ENCOUNTER — OFFICE VISIT (OUTPATIENT)
Dept: DERMATOLOGY | Facility: CLINIC | Age: 39
End: 2024-10-15
Payer: COMMERCIAL

## 2024-10-15 DIAGNOSIS — L91.0 KELOID: ICD-10-CM

## 2024-10-15 DIAGNOSIS — L80 VITILIGO: Primary | ICD-10-CM

## 2024-10-15 PROCEDURE — 99999 PR PBB SHADOW E&M-EST. PATIENT-LVL II: CPT | Mod: PBBFAC,,, | Performed by: DERMATOLOGY

## 2024-10-15 NOTE — PROGRESS NOTES
Subjective:      Patient ID:  Daniela Dillon is a 39 y.o. female who presents for No chief complaint on file.    HPI  39 y.o. F with PMH vitiligo and keloids who presents for follow-up. LOV 7/2024, patient was started on opzelura 1.5% cream. Additionally, the keloid on her left helix was injected for the first time with ILK 10 mg/ml.   Today, patient reports she is not sure if the ILK injection helped but would like to be more consistent with the ILK injections. Additionally, she has not been using the opzelura cream because she saw the commercials on TV and was afraid of the side effects. No significant increase in number of depigmented macules/patches.       Review of Systems   Skin:  Negative for itching and rash.       Objective:   Physical Exam   Constitutional: She appears well-developed and well-nourished. No distress.   Neurological: She is alert and oriented to person, place, and time. She is not disoriented.   Psychiatric: She has a normal mood and affect.   Skin:   Areas Examined (abnormalities noted in diagram):   Head / Face Inspection Performed  Neck Inspection Performed  Chest / Axilla Inspection Performed  Back Inspection Performed  RUE Inspected  LUE Inspection Performed                 Diagram Legend     Erythematous scaling macule/papule c/w actinic keratosis       Vascular papule c/w angioma      Pigmented verrucoid papule/plaque c/w seborrheic keratosis      Yellow umbilicated papule c/w sebaceous hyperplasia      Irregularly shaped tan macule c/w lentigo     1-2 mm smooth white papules consistent with Milia      Movable subcutaneous cyst with punctum c/w epidermal inclusion cyst      Subcutaneous movable cyst c/w pilar cyst      Firm pink to brown papule c/w dermatofibroma      Pedunculated fleshy papule(s) c/w skin tag(s)      Evenly pigmented macule c/w junctional nevus     Mildly variegated pigmented, slightly irregular-bordered macule c/w mildly atypical nevus      Flesh colored to  evenly pigmented papule c/w intradermal nevus       Pink pearly papule/plaque c/w basal cell carcinoma      Erythematous hyperkeratotic cursted plaque c/w SCC      Surgical scar with no sign of skin cancer recurrence      Open and closed comedones      Inflammatory papules and pustules      Verrucoid papule consistent consistent with wart     Erythematous eczematous patches and plaques     Dystrophic onycholytic nail with subungual debris c/w onychomycosis     Umbilicated papule    Erythematous-base heme-crusted tan verrucoid plaque consistent with inflamed seborrheic keratosis     Erythematous Silvery Scaling Plaque c/w Psoriasis     See annotation                  Assessment / Plan:        Vitiligo    Keloid      Vitiligo  Has not yet tried opzelura for a significant amount of time due to concerns of side effects.  Plan:  - restart opzelura: Use 2x per day until clear and then 2 more days then stop. Do not use on more than 20 % of your body at the same time; Should be use intermittently for flares ( do not use when clear). Do not use this medication if you are on immunosuppressant medications.   Discussed there are many side effects reported when this medication is used as an oral drug. Systemic absorption is minimal and having these side effects when using as a cream would be exceptionally rare. However if you experience any of these as new side effects while on the cream, please stop the cream and inform your prescribing physician.   Acneiform eruption   Herpes infections or upper respiratory infection   Other serious infections   Oral medication use can be associated with increased risk of lymphoma   Increased risk of cardiac events or pulmonary emboli   This medication should not be used if pregnant or lactating    Keloid  Left helix 2/2 cartilage earring injection. S/p ILK 10 mg/ml injection x1 in July 2024. Patient would like to be more consistent with ILK injections prior to considering surgery.  Plan:  -  performed ILK 20 mg/ml injection today to both posterior and anterior keloid today  - follow-up 6-8 weeks for repeat injection    Intralesional Kenalog 20 mg/cc (0.3 cc total) injected into 1 lesions (anterior and posterior) on the left helix today after obtaining verbal consent including risk of surrounding hypopigmentation. Patient tolerated procedure well.    Units: 0.3 cc of 20 mg/cc  NDC for Kenalog 40mg/cc:  9644-4656-87         Sharla Abdalla MD  [facility] Dermatology PGY-III

## 2024-10-18 ENCOUNTER — PATIENT MESSAGE (OUTPATIENT)
Dept: DERMATOLOGY | Facility: CLINIC | Age: 39
End: 2024-10-18
Payer: COMMERCIAL

## 2024-11-04 ENCOUNTER — PATIENT MESSAGE (OUTPATIENT)
Dept: OBSTETRICS AND GYNECOLOGY | Facility: CLINIC | Age: 39
End: 2024-11-04
Payer: COMMERCIAL

## 2024-11-05 RX ORDER — FLUCONAZOLE 150 MG/1
150 TABLET ORAL ONCE
Qty: 1 TABLET | Refills: 0 | Status: SHIPPED | OUTPATIENT
Start: 2024-11-05 | End: 2024-11-05

## 2024-11-12 ENCOUNTER — OFFICE VISIT (OUTPATIENT)
Dept: PULMONOLOGY | Facility: CLINIC | Age: 39
End: 2024-11-12
Payer: COMMERCIAL

## 2024-11-12 ENCOUNTER — OFFICE VISIT (OUTPATIENT)
Dept: DERMATOLOGY | Facility: CLINIC | Age: 39
End: 2024-11-12
Payer: COMMERCIAL

## 2024-11-12 VITALS
OXYGEN SATURATION: 98 % | HEART RATE: 97 BPM | SYSTOLIC BLOOD PRESSURE: 112 MMHG | WEIGHT: 187.63 LBS | HEIGHT: 65 IN | DIASTOLIC BLOOD PRESSURE: 81 MMHG | BODY MASS INDEX: 31.26 KG/M2

## 2024-11-12 DIAGNOSIS — E66.09 CLASS 1 OBESITY DUE TO EXCESS CALORIES WITH SERIOUS COMORBIDITY AND BODY MASS INDEX (BMI) OF 31.0 TO 31.9 IN ADULT: ICD-10-CM

## 2024-11-12 DIAGNOSIS — J45.909 ASTHMA, UNSPECIFIED ASTHMA SEVERITY, UNSPECIFIED WHETHER COMPLICATED, UNSPECIFIED WHETHER PERSISTENT: ICD-10-CM

## 2024-11-12 DIAGNOSIS — K21.9 GASTROESOPHAGEAL REFLUX DISEASE, UNSPECIFIED WHETHER ESOPHAGITIS PRESENT: ICD-10-CM

## 2024-11-12 DIAGNOSIS — G47.33 OSA (OBSTRUCTIVE SLEEP APNEA): Primary | ICD-10-CM

## 2024-11-12 DIAGNOSIS — E66.811 CLASS 1 OBESITY DUE TO EXCESS CALORIES WITH SERIOUS COMORBIDITY AND BODY MASS INDEX (BMI) OF 31.0 TO 31.9 IN ADULT: ICD-10-CM

## 2024-11-12 DIAGNOSIS — L80 VITILIGO: ICD-10-CM

## 2024-11-12 DIAGNOSIS — R09.81 NASAL CONGESTION: ICD-10-CM

## 2024-11-12 DIAGNOSIS — L91.0 KELOID: Primary | ICD-10-CM

## 2024-11-12 PROCEDURE — 99204 OFFICE O/P NEW MOD 45 MIN: CPT | Mod: S$GLB,,, | Performed by: INTERNAL MEDICINE

## 2024-11-12 PROCEDURE — 99212 OFFICE O/P EST SF 10 MIN: CPT | Mod: 25,S$GLB,, | Performed by: DERMATOLOGY

## 2024-11-12 PROCEDURE — 11900 INJECT SKIN LESIONS </W 7: CPT | Mod: S$GLB,,, | Performed by: DERMATOLOGY

## 2024-11-12 PROCEDURE — 3074F SYST BP LT 130 MM HG: CPT | Mod: CPTII,S$GLB,, | Performed by: INTERNAL MEDICINE

## 2024-11-12 PROCEDURE — 99999 PR PBB SHADOW E&M-EST. PATIENT-LVL IV: CPT | Mod: PBBFAC,,, | Performed by: INTERNAL MEDICINE

## 2024-11-12 PROCEDURE — 1159F MED LIST DOCD IN RCRD: CPT | Mod: CPTII,S$GLB,, | Performed by: INTERNAL MEDICINE

## 2024-11-12 PROCEDURE — 3079F DIAST BP 80-89 MM HG: CPT | Mod: CPTII,S$GLB,, | Performed by: INTERNAL MEDICINE

## 2024-11-12 PROCEDURE — 3044F HG A1C LEVEL LT 7.0%: CPT | Mod: CPTII,S$GLB,, | Performed by: INTERNAL MEDICINE

## 2024-11-12 PROCEDURE — 99999 PR PBB SHADOW E&M-EST. PATIENT-LVL II: CPT | Mod: PBBFAC,,, | Performed by: DERMATOLOGY

## 2024-11-12 PROCEDURE — 3044F HG A1C LEVEL LT 7.0%: CPT | Mod: CPTII,S$GLB,, | Performed by: DERMATOLOGY

## 2024-11-12 PROCEDURE — 3008F BODY MASS INDEX DOCD: CPT | Mod: CPTII,S$GLB,, | Performed by: INTERNAL MEDICINE

## 2024-11-12 RX ORDER — ALBUTEROL SULFATE 90 UG/1
2 INHALANT RESPIRATORY (INHALATION) EVERY 6 HOURS PRN
Qty: 18 G | Refills: 3 | Status: SHIPPED | OUTPATIENT
Start: 2024-11-12

## 2024-11-12 RX ORDER — FLUTICASONE PROPIONATE AND SALMETEROL XINAFOATE 115; 21 UG/1; UG/1
2 AEROSOL, METERED RESPIRATORY (INHALATION) 2 TIMES DAILY
Qty: 12 G | Refills: 3 | Status: SHIPPED | OUTPATIENT
Start: 2024-11-12

## 2024-11-12 NOTE — PROGRESS NOTES
Daniela Dillon  was seen as a new patient at the request  Melvi Navarro MD for the evaluation of  asthma.    CHIEF COMPLAINT:  Asthma      HISTORY OF PRESENT ILLNESS: Daniela Dillon is a 39 y.o. female  has a past medical history of Asthma.  Patient was seen by  for asthma.  Patient was prescribed advair and singulair.  Advair ran out since 7/2024.  Only used prn albuterol.  Using albuterol 1-2 times per month.  Occasional cough. No wheezing.  Taking zyrtec and claritin for nasal congestion.  Frequent gerd symptoms treated with prn pepcid.  S/p allergy test in 20s with several allergies.    Additional Pulmonary History:   Occupational/Environmental Exposures:  teacher and student nurse.    Exposure to Animals/Pets:  dog and cat  Foreign Travel History:  vacation   History of exposures to TB:  denied   Family History of Lung Cancer:  denied   Tobacco:  denied  Childhood history of Lung Disease:  denied  Chest surgery or trauma:  denied      PAST MEDICAL HISTORY:    Active Ambulatory Problems     Diagnosis Date Noted    Asthma 03/31/2023    Request for sterilization 11/14/2023    S/P endometrial ablation 11/14/2023    Abnormal uterine bleeding (AUB) 11/14/2023    GERD (gastroesophageal reflux disease) 11/12/2024    Nasal congestion 11/12/2024    JOSE RAUL (obstructive sleep apnea) 11/12/2024    Class 1 obesity due to excess calories with body mass index (BMI) of 31.0 to 31.9 in adult 11/12/2024     Resolved Ambulatory Problems     Diagnosis Date Noted    No Resolved Ambulatory Problems     No Additional Past Medical History                PAST SURGICAL HISTORY:    Past Surgical History:   Procedure Laterality Date    DILATION AND CURETTAGE OF UTERUS      2021 for polyp    LAPAROSCOPIC SALPINGECTOMY Bilateral 11/14/2023    Procedure: SALPINGECTOMY, LAPAROSCOPIC;  Surgeon: Yaa Slaughter MD;  Location: [facility];  Service: OB/GYN;  Laterality: Bilateral;    THERMAL ABLATION OF ENDOMETRIUM USING  HYSTEROSCOPY N/A 11/14/2023    Procedure: ABLATION, ENDOMETRIUM, THERMAL, HYSTEROSCOPIC;  Surgeon: Yaa Slaughter MD;  Location: [facility] OR;  Service: OB/GYN;  Laterality: N/A;  RN PREOP 10/25/2023 -- TYPE & SCREEN/---done----- HCG 11/11/23 --<1.2,  HOLOGIC EFRAÍN SHERMAN  [phone] NOTIFIED EFRAÍN ON 11/3/2023-LO         FAMILY HISTORY:                Family History   Problem Relation Name Age of Onset    Hodgkin's lymphoma Mother      Stroke Father      Hypertension Father      Drug abuse Father      Alcohol abuse Father      Endocrine tumor Sister      Lung cancer Paternal Grandmother          smoker    Fibroids Daughter dani     No Known Problems Son rbia        SOCIAL HISTORY:          Tobacco:   Social History     Tobacco Use   Smoking Status Never   Smokeless Tobacco Never     alcohol use:    Social History     Substance and Sexual Activity   Alcohol Use Yes    Comment: social                   ALLERGIES:    Review of patient's allergies indicates:   Allergen Reactions    Grass pollen-june grass standard Hives     ITCHING, RUNNY NOSE    Mycobacterium tuberculosis (tuberculin ppd) Rash       CURRENT MEDICATIONS:    Current Outpatient Medications   Medication Sig Dispense Refill    cetirizine (ZYRTEC) 10 MG tablet Take 10 mg by mouth once daily.      clobetasoL (TEMOVATE) 0.05 % cream Apply topically 2 (two) times daily. To bump on back of ear 15 g 1    fluticasone propionate (FLONASE) 50 mcg/actuation nasal spray 1 spray by Nasal route.      montelukast (SINGULAIR) 10 mg tablet Take 10 mg by mouth every evening.      ruxolitinib 1.5 % Crea Apply to hypopigmented spots daily 60 g 5    ADVAIR -21 mcg/actuation HFAA inhaler Inhale 2 puffs into the lungs 2 (two) times daily. 12 g 3    albuterol (PROVENTIL/VENTOLIN HFA) 90 mcg/actuation inhaler Inhale 2 puffs into the lungs every 6 (six) hours as needed. 18 g 3     Current Facility-Administered Medications   Medication Dose Route Frequency Provider  "Last Rate Last Admin    triamcinolone acetonide injection 10 mg  10 mg Intradermal 1 time in Clinic/HOD                       REVIEW OF SYSTEMS:     Pulmonary related symptoms as per HPI.  Gen:  + weight gain, no fever, no night sweat  HEENT:  no visual changes, no sore throat, no hearing loss  CV:  No chest pain, no orthopnea, no PND  GI:  no melena, no hematochezia, no diarhea, no constipation.  :  no dysuria, no hematuria, no hesistancy, no dribbling  Neuro:  no syncope, no vertigo, no tinitus  Psych:  No homocide or suicide ideation; no depression.  Endocrine:  No heat or cold intolerance.  Sleep:  + snoring; no witnessed apnea.  Frequent awakening.  Tire upon awake.  Gasping sensation upon awake.    Otherwise, a balance of systems reviewed is negative.          PHYSICAL EXAM:  Vitals:    11/12/24 1403   BP: 112/81   Pulse: 97   SpO2: 98%   Weight: 85.1 kg (187 lb 9.8 oz)   Height: 5' 5" (1.651 m)   PainSc: 0-No pain     Body mass index is 31.22 kg/m².     GENERAL:  well develop; no apparent distress  HEENT:  + nasal congestion; no discharge noted; class 3 modified mallampatti.   NECK:  supple; no palpable masses.  CARDIO: regular rate and rhythm  PULM:  clear to auscultation bilaterally; no intercostals retractions; no accessory muscle usage   ABDOMEN:  soft nontender/nondistended.  +bowel sound  EXTREMITIES no cce  NEURO:  CN II-XII intact.  5/5 motor in all extremities.  sensation grossly intact   to light touch.  PSYCH:  normal affect.  Alert and oriented x 4    LABS  Pulmonary Functions Testing Results(personally reviewed):  none  ABG (personally reviewed):  none  CXR (personally reviewed):  10/25/23 no consolidation or effusion  CT CHEST(personally reviewed):  none    ASSESSMENT/PLAN  Problem List Items Addressed This Visit       Asthma    Overview     Intermittent wheezing while on albuterol  Frequent gerd and nasal congestion.         Current Assessment & Plan     baseline pft.  Will resume advair.     "       Relevant Medications    ADVAIR -21 mcg/actuation HFAA inhaler    albuterol (PROVENTIL/VENTOLIN HFA) 90 mcg/actuation inhaler    Other Relevant Orders    Complete PFT with bronchodilator    Class 1 obesity due to excess calories with body mass index (BMI) of 31.0 to 31.9 in adult    Overview     Gained 40 lbs in past 2 years.    aware of need for weight loss.          GERD (gastroesophageal reflux disease)    Overview     tips for gerd d/w patient. Prn pepcid.          Nasal congestion    Overview     mild nasal congestion on exam.  Flonase.  Will add asteline         JOSE RAUL (obstructive sleep apnea) - Primary    Overview     The patient symptomatically has loud snoring, witnessed apnea, restless with findings of high grade mallampatti. This warrants further investigation for possible obstructive sleep apnea.  Patient will be contacted after sleep study is done.            Relevant Orders    Home Sleep Study       Patient will No follow-ups on file.     CC: Send copy of this note to Melvi Navarro MD

## 2024-11-12 NOTE — PROGRESS NOTES
Subjective:      Patient ID:  Daniela Dillon is a 39 y.o. female who presents for   Chief Complaint   Patient presents with    Keloid     Ms. Dillon is a 38yo F with vitiligo and keloids who presents for follow-up keloid injections. Last seen 1 mo ago at which time restarted on opzelura for vitiligo and injected left ear keloid with ILK 20.     Today, patient notes some improvement to keloid after last injection. Has started the opzelura cream to areas of vitiligo on her body, has not noticed significant difference yet.        Review of Systems   Constitutional: Negative.    Skin:  Negative for itching and rash.       Objective:   Physical Exam   Constitutional: She appears well-developed and well-nourished. No distress.   Neurological: She is alert and oriented to person, place, and time. She is not disoriented.   Psychiatric: She has a normal mood and affect.   Skin:   Areas Examined (abnormalities noted in diagram):   Head / Face Inspection Performed  Neck Inspection Performed  Chest / Axilla Inspection Performed  Back Inspection Performed  RUE Inspected  LUE Inspection Performed                 Diagram Legend     Erythematous scaling macule/papule c/w actinic keratosis       Vascular papule c/w angioma      Pigmented verrucoid papule/plaque c/w seborrheic keratosis      Yellow umbilicated papule c/w sebaceous hyperplasia      Irregularly shaped tan macule c/w lentigo     1-2 mm smooth white papules consistent with Milia      Movable subcutaneous cyst with punctum c/w epidermal inclusion cyst      Subcutaneous movable cyst c/w pilar cyst      Firm pink to brown papule c/w dermatofibroma      Pedunculated fleshy papule(s) c/w skin tag(s)      Evenly pigmented macule c/w junctional nevus     Mildly variegated pigmented, slightly irregular-bordered macule c/w mildly atypical nevus      Flesh colored to evenly pigmented papule c/w intradermal nevus       Pink pearly papule/plaque c/w basal cell carcinoma       Erythematous hyperkeratotic cursted plaque c/w SCC      Surgical scar with no sign of skin cancer recurrence      Open and closed comedones      Inflammatory papules and pustules      Verrucoid papule consistent consistent with wart     Erythematous eczematous patches and plaques     Dystrophic onycholytic nail with subungual debris c/w onychomycosis     Umbilicated papule    Erythematous-base heme-crusted tan verrucoid plaque consistent with inflamed seborrheic keratosis     Erythematous Silvery Scaling Plaque c/w Psoriasis     See annotation      Assessment / Plan:        Keloid  Left ear helix 2/2 cartilage earring. S/p ILK 10 injection x1 7/2024, ILK 20 injection x1 10/2024 with partial improvement.  - repeat ILK 20mg/ml injection today    Intralesional Kenalog 20 mg/cc (0.2 cc total) injected into 2 lesions on the left ear helix today after obtaining verbal consent including risk of surrounding hypopigmentation. Patient tolerated procedure well.    Units: 0.2 cc  NDC for Kenalog 40mg/cc:  6671-5133-24    Vitiligo  - continue opzelura cream daily to affected areas on body. <20% BSA involved      Follow up in about 6 weeks (around 12/24/2024).    Leti Nichols MD  Dermatology, PGY-3  11/12/2024

## 2024-11-12 NOTE — PATIENT INSTRUCTIONS
flonase or nasonex over the counter.  2 sprays per nostril daily. Be sure to tilt head forward when dispensing medication.

## 2024-11-25 ENCOUNTER — HOSPITAL ENCOUNTER (OUTPATIENT)
Dept: RESPIRATORY THERAPY | Facility: HOSPITAL | Age: 39
Discharge: HOME | End: 2024-11-25
Attending: INTERNAL MEDICINE
Payer: COMMERCIAL

## 2024-11-25 ENCOUNTER — TELEPHONE (OUTPATIENT)
Dept: PULMONOLOGY | Facility: CLINIC | Age: 39
End: 2024-11-25
Payer: COMMERCIAL

## 2024-11-25 DIAGNOSIS — J45.20 MILD INTERMITTENT ASTHMA WITHOUT COMPLICATION: Primary | ICD-10-CM

## 2024-11-25 DIAGNOSIS — J45.909 ASTHMA, UNSPECIFIED ASTHMA SEVERITY, UNSPECIFIED WHETHER COMPLICATED, UNSPECIFIED WHETHER PERSISTENT: ICD-10-CM

## 2024-11-25 RX ORDER — ALBUTEROL SULFATE 2.5 MG/.5ML
2.5 SOLUTION RESPIRATORY (INHALATION) ONCE
Status: DISCONTINUED | OUTPATIENT
Start: 2024-11-25 | End: 2024-11-25

## 2024-11-25 NOTE — TELEPHONE ENCOUNTER
----- Message from Shania sent at 11/25/2024 10:39 AM CST -----  Regarding: New order  Hey could you put in a new order for a PFT, the past PFT was canceled and order was canceled with it. Thank you!

## 2024-11-26 ENCOUNTER — HOSPITAL ENCOUNTER (OUTPATIENT)
Dept: RESPIRATORY THERAPY | Facility: HOSPITAL | Age: 39
Discharge: HOME | End: 2024-11-26
Attending: INTERNAL MEDICINE
Payer: COMMERCIAL

## 2024-11-26 VITALS — HEART RATE: 77 BPM | RESPIRATION RATE: 17 BRPM

## 2024-11-26 DIAGNOSIS — J45.20 MILD INTERMITTENT ASTHMA WITHOUT COMPLICATION: ICD-10-CM

## 2024-11-26 LAB
DLCO ADJ PRE: 19.06 ML/(MIN*MMHG)
DLCO SINGLE BREATH LLN: 20.39
DLCO SINGLE BREATH PRE REF: 73 %
DLCO SINGLE BREATH REF: 26.12
DLCOC SBVA LLN: 3.62
DLCOC SBVA PRE REF: 86.7 %
DLCOC SBVA REF: 5.12
DLCOC SINGLE BREATH LLN: 20.39
DLCOC SINGLE BREATH PRE REF: 73 %
DLCOC SINGLE BREATH REF: 26.12
DLCOVA LLN: 3.62
DLCOVA PRE REF: 86.7 %
DLCOVA PRE: 4.44 ML/(MIN*MMHG*L)
DLCOVA REF: 5.12
DLVAADJ PRE: 4.44 ML/(MIN*MMHG*L)
ERVN2 LLN: -16448.91
ERVN2 PRE REF: 74.9 %
ERVN2 PRE: 0.82 L
ERVN2 REF: 1.09
FEF 25 75 CHG: 14 %
FEF 25 75 LLN: 2.19
FEF 25 75 POST REF: 67.2 %
FEF 25 75 PRE REF: 58.9 %
FEF 25 75 REF: 3.59
FET100 CHG: -17.7 %
FEV1 CHG: 4 %
FEV1 FVC CHG: 4.3 %
FEV1 FVC LLN: 72
FEV1 FVC POST REF: 97.2 %
FEV1 FVC PRE REF: 93.2 %
FEV1 FVC REF: 82
FEV1 LLN: 2.05
FEV1 POST REF: 91.6 %
FEV1 PRE REF: 88 %
FEV1 REF: 2.65
FRCN2 LLN: 1.91
FRCN2 PRE REF: 69.4 %
FRCN2 REF: 2.74
FVC CHG: -0.2 %
FVC LLN: 2.52
FVC POST REF: 93.8 %
FVC PRE REF: 94 %
FVC REF: 3.23
IVC PRE: 3.06 L
IVC SINGLE BREATH LLN: 2.52
IVC SINGLE BREATH PRE REF: 94.7 %
IVC SINGLE BREATH REF: 3.23
PEF CHG: -11.8 %
PEF LLN: 4.66
PEF POST REF: 55 %
PEF PRE REF: 62.4 %
PEF REF: 6.73
POST FEF 25 75: 2.41 L/S
POST FET 100: 8.09 SEC
POST FEV1 FVC: 79.99 %
POST FEV1: 2.43 L
POST FVC: 3.03 L
POST PEF: 3.7 L/S
PRE DLCO: 19.06 ML/(MIN*MMHG)
PRE FEF 25 75: 2.11 L/S
PRE FET 100: 9.83 SEC
PRE FEV1 FVC: 76.72 %
PRE FEV1: 2.33 L
PRE FRC N2: 1.9 L
PRE FVC: 3.04 L
PRE PEF: 4.2 L/S
RVN2 LLN: 1.07
RVN2 PRE REF: 65.8 %
RVN2 PRE: 1.08 L
RVN2 REF: 1.64
RVN2TLCN2 LLN: 23.31
RVN2TLCN2 PRE REF: 91.4 %
RVN2TLCN2 PRE: 30.08 %
RVN2TLCN2 REF: 32.9
TLCN2 LLN: 4.11
TLCN2 PRE REF: 70.4 %
TLCN2 PRE: 3.59 L
TLCN2 REF: 5.1
VA PRE: 4.29 L
VA SINGLE BREATH LLN: 4.95
VA SINGLE BREATH PRE REF: 86.8 %
VA SINGLE BREATH REF: 4.95
VCMAXN2 LLN: 2.52
VCMAXN2 PRE REF: 77.6 %
VCMAXN2 PRE: 2.51 L
VCMAXN2 REF: 3.23

## 2024-11-26 PROCEDURE — 94729 DIFFUSING CAPACITY: CPT

## 2024-11-26 PROCEDURE — 94727 GAS DIL/WSHOT DETER LNG VOL: CPT

## 2024-11-26 PROCEDURE — 25000242 PHARM REV CODE 250 ALT 637 W/ HCPCS: Performed by: INTERNAL MEDICINE

## 2024-11-26 RX ORDER — ALBUTEROL SULFATE 2.5 MG/.5ML
2.5 SOLUTION RESPIRATORY (INHALATION) ONCE
Status: COMPLETED | OUTPATIENT
Start: 2024-11-26 | End: 2024-11-26

## 2024-11-26 RX ADMIN — ALBUTEROL SULFATE 2.5 MG: 2.5 SOLUTION RESPIRATORY (INHALATION) at 09:11

## 2025-01-06 ENCOUNTER — IMMUNIZATION (OUTPATIENT)
Dept: FAMILY MEDICINE | Facility: CLINIC | Age: 40
End: 2025-01-06
Payer: COMMERCIAL

## 2025-01-06 DIAGNOSIS — Z23 INFLUENZA VACCINE ADMINISTERED: Primary | ICD-10-CM

## 2025-01-06 DIAGNOSIS — Z23 COVID-19 VACCINE ADMINISTERED: ICD-10-CM

## 2025-01-06 PROCEDURE — 90656 IIV3 VACC NO PRSV 0.5 ML IM: CPT | Mod: S$GLB,,, | Performed by: FAMILY MEDICINE

## 2025-01-06 PROCEDURE — 90480 ADMN SARSCOV2 VAC 1/ONLY CMP: CPT | Mod: S$GLB,,, | Performed by: FAMILY MEDICINE

## 2025-01-06 PROCEDURE — 90471 IMMUNIZATION ADMIN: CPT | Mod: S$GLB,,, | Performed by: FAMILY MEDICINE

## 2025-01-06 PROCEDURE — 91320 SARSCV2 VAC 30MCG TRS-SUC IM: CPT | Mod: S$GLB,,, | Performed by: FAMILY MEDICINE

## 2025-01-24 ENCOUNTER — PATIENT MESSAGE (OUTPATIENT)
Dept: FAMILY MEDICINE | Facility: CLINIC | Age: 40
End: 2025-01-24

## 2025-01-24 ENCOUNTER — OFFICE VISIT (OUTPATIENT)
Dept: FAMILY MEDICINE | Facility: CLINIC | Age: 40
End: 2025-01-24
Payer: COMMERCIAL

## 2025-01-24 VITALS
SYSTOLIC BLOOD PRESSURE: 122 MMHG | HEIGHT: 65 IN | TEMPERATURE: 98 F | BODY MASS INDEX: 31.85 KG/M2 | DIASTOLIC BLOOD PRESSURE: 78 MMHG | WEIGHT: 191.13 LBS | HEART RATE: 89 BPM

## 2025-01-24 DIAGNOSIS — L80 VITILIGO: ICD-10-CM

## 2025-01-24 DIAGNOSIS — J45.20 MILD INTERMITTENT ASTHMA WITHOUT COMPLICATION: ICD-10-CM

## 2025-01-24 DIAGNOSIS — M54.41 ACUTE RIGHT-SIDED LOW BACK PAIN WITH RIGHT-SIDED SCIATICA: ICD-10-CM

## 2025-01-24 DIAGNOSIS — Z02.0 SCHOOL PHYSICAL EXAM: Primary | ICD-10-CM

## 2025-01-24 PROCEDURE — 3008F BODY MASS INDEX DOCD: CPT | Mod: CPTII,S$GLB,, | Performed by: INTERNAL MEDICINE

## 2025-01-24 PROCEDURE — 99999 PR PBB SHADOW E&M-EST. PATIENT-LVL III: CPT | Mod: PBBFAC,,, | Performed by: INTERNAL MEDICINE

## 2025-01-24 PROCEDURE — 3078F DIAST BP <80 MM HG: CPT | Mod: CPTII,S$GLB,, | Performed by: INTERNAL MEDICINE

## 2025-01-24 PROCEDURE — 3074F SYST BP LT 130 MM HG: CPT | Mod: CPTII,S$GLB,, | Performed by: INTERNAL MEDICINE

## 2025-01-24 PROCEDURE — 1159F MED LIST DOCD IN RCRD: CPT | Mod: CPTII,S$GLB,, | Performed by: INTERNAL MEDICINE

## 2025-01-24 PROCEDURE — 1160F RVW MEDS BY RX/DR IN RCRD: CPT | Mod: CPTII,S$GLB,, | Performed by: INTERNAL MEDICINE

## 2025-01-24 PROCEDURE — 99214 OFFICE O/P EST MOD 30 MIN: CPT | Mod: S$GLB,,, | Performed by: INTERNAL MEDICINE

## 2025-01-24 NOTE — PROGRESS NOTES
"Chief Complaint: Establish Care and Immunizations (TB Gold needed)      Daniela Dillon  is a 39 y.o. 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    SCHOOL HEALTH REQUIREMENTS:  She requires TB testing for nursing school requirements due February 1st, 2025. She had TB testing via [employer] in April which resulted in a previously unknown allergic reaction. Despite recent testing, school system indicates April TB test expires February 1st and requires new testing for continued enrollment.    MUSCULOSKELETAL:  She reports acute lower back and leg pain after being knocked down by her dog. She experiences shooting pain from her lower back to her buttocks, sometimes extending to her ankle, describing it as "neurons on fire" or "spicy." Pain improves with lower back stretches. She uses a heat pad and ibuprofen for pain management. She has a 5-year history of sciatica.    PAST MEDICAL HISTORY:  History notable for COVID-19 infection with prolonged symptoms lasting several months. She developed a pinched nerve in her neck secondary to continuous COVID-related coughing.    CURRENT MEDICATIONS:  She takes Opsalura for vitiligo, Advair and Albuterol for respiratory management, and OTC Flonase.      ROS:  General: -fever, -chills, -fatigue, -weight gain, -weight loss  Eyes: -vision changes, -redness, -discharge  ENT: -ear pain, -nasal congestion, -sore throat  Cardiovascular: -chest pain, -palpitations, -lower extremity edema  Respiratory: +cough, -shortness of breath  Gastrointestinal: -abdominal pain, -nausea, -vomiting, -diarrhea, -constipation, -blood in stool  Genitourinary: -dysuria, -hematuria, -frequency  Musculoskeletal: -joint pain, -muscle pain, +back pain  Skin: -rash, -lesion  Neurological: -headache, -dizziness, -numbness, -tingling  Psychiatric: -anxiety, -depression, -sleep difficulty     "     Past Medical History:   Diagnosis Date    Asthma        Past Surgical History:   Procedure Laterality Date    DILATION AND CURETTAGE OF UTERUS      2021 for polyp    LAPAROSCOPIC SALPINGECTOMY Bilateral 11/14/2023    Procedure: SALPINGECTOMY, LAPAROSCOPIC;  Surgeon: Yaa Slaughter MD;  Location: [facility] [place] OR;  Service: OB/GYN;  Laterality: Bilateral;    THERMAL ABLATION OF ENDOMETRIUM USING HYSTEROSCOPY N/A 11/14/2023    Procedure: ABLATION, ENDOMETRIUM, THERMAL, HYSTEROSCOPIC;  Surgeon: Yaa Slaughter MD;  Location: [facility] [place] OR;  Service: OB/GYN;  Laterality: N/A;  RN PREOP 10/25/2023 -- TYPE & SCREEN/---done----- HCG 11/11/23 --<1.2,  TANVI SHERMAN  [phone] NOTIFIED EFRAÍN ON 11/3/2023-LO        Family History   Problem Relation Name Age of Onset    Hodgkin's lymphoma Mother      Stroke Father      Hypertension Father      Drug abuse Father      Alcohol abuse Father      Endocrine tumor Sister      Lung cancer Paternal Grandmother          smoker    Fibroids Daughter dani     No Known Problems Son bria         Social History     Socioeconomic History    Marital status:    Tobacco Use    Smoking status: Never    Smokeless tobacco: Never   Substance and Sexual Activity    Alcohol use: Yes     Comment: social    Drug use: Yes     Types: Marijuana    Sexual activity: Yes     Partners: Male     Birth control/protection: None   Social History Narrative     since 2009    She is teacher at [employer].     Social Drivers of Health     Financial Resource Strain: Low Risk  (6/27/2024)    Overall Financial Resource Strain (CARDIA)     Difficulty of Paying Living Expenses: Not very hard   Food Insecurity: No Food Insecurity (6/27/2024)    Hunger Vital Sign     Worried About Running Out of Food in the Last Year: Never true     Ran Out of Food in the Last Year: Never true   Physical Activity: Sufficiently Active (6/27/2024)    Exercise Vital Sign     Days of Exercise per Week: 5 days      Minutes of Exercise per Session: 60 min   Stress: No Stress Concern Present (6/27/2024)    [employer] - Occupational Stress Questionnaire     Feeling of Stress : Only a little   Housing Stability: Unknown (6/27/2024)    Housing Stability Vital Sign     Unable to Pay for Housing in the Last Year: No         Current Outpatient Medications:     ADVAIR -21 mcg/actuation HFAA inhaler, Inhale 2 puffs into the lungs 2 (two) times daily., Disp: 12 g, Rfl: 3    albuterol (PROVENTIL/VENTOLIN HFA) 90 mcg/actuation inhaler, Inhale 2 puffs into the lungs every 6 (six) hours as needed., Disp: 18 g, Rfl: 3    cetirizine (ZYRTEC) 10 MG tablet, Take 10 mg by mouth once daily., Disp: , Rfl:     fluticasone propionate (FLONASE) 50 mcg/actuation nasal spray, 1 spray by Nasal route., Disp: , Rfl:     montelukast (SINGULAIR) 10 mg tablet, Take 10 mg by mouth every evening., Disp: , Rfl:     ruxolitinib 1.5 % Crea, Apply to hypopigmented spots daily, Disp: 60 g, Rfl: 5    Current Facility-Administered Medications:     triamcinolone acetonide injection 10 mg, 10 mg, Intradermal, 1 time in Clinic/HOD,            Objective:      Vitals:    01/24/25 1525   BP: 122/78   Pulse: 89   Temp: 98 °F (36.7 °C)       Physical Exam  Constitutional:       Appearance: Normal appearance.   HENT:      Head: Normocephalic and atraumatic.   Skin:     General: Skin is warm and dry.   Neurological:      General: No focal deficit present.      Mental Status: She is alert and oriented to person, place, and time.   Psychiatric:         Mood and Affect: Mood normal.         Behavior: Behavior normal.          Assessment:       1. School physical exam    2. Acute right-sided low back pain with right-sided sciatica    3. Vitiligo    4. Mild intermittent asthma without complication          Plan:   1. School physical exam  Assessment & Plan:  Patient needs repeat TB screening   Will be scheduling follow up soon to get repeat  annual visit and blood work   - QuantiFERON-TB Gold test ordered.  - Follow up for TB test before 10am Monday-Thursday.    Orders:  -     QUANTIFERON GOLD TB; Future; Expected date: 01/24/2025    2. Acute right-sided low back pain with right-sided sciatica  Assessment & Plan:  - Assessed the patient's complaint of back and leg pain, likely due to pinched nerve.  - Daniela reports a pinched nerve causing shooting pain from the lower back to the leg and ankle, with symptoms of neurons feeling 'on fire' or 'spicy'.  - Noted improvement in symptoms after 2 weeks of stretching, with reduction in 'spicy' sensation but persistent pain in the anterior leg.  - Recommend continued stretching and self-care for 4 more weeks before considering referral.  - Discussed normal timeline for recovery from pinched nerve pain (4-6 weeks).  - Advised the patient to continue stretching exercises, use ibuprofen for pain relief, and apply heat therapy.  - Instructed the patient to contact the office if back/leg pain persists after 6 weeks total of stretching and self-care.  - Consider referral to back and spine specialist if symptoms persist after 6 weeks      3. Vitiligo  Assessment & Plan:  - Daniela mentions recent discovery of vitiligo.  - Continued Opsalura (Ruxolitinib) cream for vitiligo treatment.  - Noted inconsistent use of prescribed medication.  - Discontinued Clobetasol.      4. Mild intermittent asthma without complication  Assessment & Plan:  - Daniela mentions history of asthma exacerbation during COVID-19 infection.  - Continued Advair for asthma management.  - Continued Albuterol for asthma management.  - Continued OTC Flonase (generic).      FOLLOW UP:  - Follow up to schedule annual exam.         No follow-ups on file.    This note was generated with the assistance of ambient listening technology. Verbal consent was obtained by the patient and accompanying visitor(s) for the recording of patient appointment to  facilitate this note. I attest to having reviewed and edited the generated note for accuracy, though some syntax or spelling errors may persist. Please contact the author of this note for any clarification.

## 2025-01-24 NOTE — ASSESSMENT & PLAN NOTE
- Daniela mentions history of asthma exacerbation during COVID-19 infection.  - Continued Advair for asthma management.  - Continued Albuterol for asthma management.  - Continued OTC Flonase (generic).

## 2025-01-24 NOTE — ASSESSMENT & PLAN NOTE
Patient needs repeat TB screening   Will be scheduling follow up soon to get repeat annual visit and blood work   - QuantiFERON-TB Gold test ordered.  - Follow up for TB test before 10am Monday-Thursday.

## 2025-01-24 NOTE — ASSESSMENT & PLAN NOTE
- Daniela mentions recent discovery of vitiligo.  - Continued Opsalura (Ruxolitinib) cream for vitiligo treatment.  - Noted inconsistent use of prescribed medication.  - Discontinued Clobetasol.

## 2025-01-24 NOTE — ASSESSMENT & PLAN NOTE
- Assessed the patient's complaint of back and leg pain, likely due to pinched nerve.  - Daniela reports a pinched nerve causing shooting pain from the lower back to the leg and ankle, with symptoms of neurons feeling 'on fire' or 'spicy'.  - Noted improvement in symptoms after 2 weeks of stretching, with reduction in 'spicy' sensation but persistent pain in the anterior leg.  - Recommend continued stretching and self-care for 4 more weeks before considering referral.  - Discussed normal timeline for recovery from pinched nerve pain (4-6 weeks).  - Advised the patient to continue stretching exercises, use ibuprofen for pain relief, and apply heat therapy.  - Instructed the patient to contact the office if back/leg pain persists after 6 weeks total of stretching and self-care.  - Consider referral to back and spine specialist if symptoms persist after 6 weeks

## 2025-01-28 ENCOUNTER — LAB VISIT (OUTPATIENT)
Dept: LAB | Facility: HOSPITAL | Age: 40
End: 2025-01-28
Attending: INTERNAL MEDICINE
Payer: COMMERCIAL

## 2025-01-28 DIAGNOSIS — Z02.0 SCHOOL PHYSICAL EXAM: ICD-10-CM

## 2025-01-28 PROCEDURE — 86480 TB TEST CELL IMMUN MEASURE: CPT | Performed by: INTERNAL MEDICINE

## 2025-01-30 ENCOUNTER — PATIENT MESSAGE (OUTPATIENT)
Dept: FAMILY MEDICINE | Facility: CLINIC | Age: 40
End: 2025-01-30
Payer: COMMERCIAL

## 2025-01-30 DIAGNOSIS — R76.12 POSITIVE QUANTIFERON-TB GOLD TEST: Primary | ICD-10-CM

## 2025-01-30 LAB
GAMMA INTERFERON BACKGROUND BLD IA-ACNC: 0.01 IU/ML
M TB IFN-G CD4+ BCKGRND COR BLD-ACNC: 0.54 IU/ML
M TB IFN-G CD4+ BCKGRND COR BLD-ACNC: 0.56 IU/ML
MITOGEN IGNF BCKGRD COR BLD-ACNC: 9.99 IU/ML
TB GOLD PLUS: POSITIVE

## 2025-02-06 ENCOUNTER — TELEPHONE (OUTPATIENT)
Dept: DERMATOLOGY | Facility: CLINIC | Age: 40
End: 2025-02-06
Payer: COMMERCIAL

## 2025-02-06 NOTE — TELEPHONE ENCOUNTER
----- Message from JAKOB Booth sent at 2/4/2025 12:48 PM CST -----  Contact: [phone] Patient    ----- Message -----  From: Sugar Sun  Sent: 2/4/2025   7:35 AM CST  To: [facility]ul[facility]cal[facility] Staff    Patient would like to get medical advice.  Symptoms (please be specific):   Pt is requesting an appt  any date 2/6/2025 or later for a steroid injection for earlobe.  Pt states she supposed to have someone schedule her next visit at her last appt  but there wasn't any follow up scheduled.     How long have you had these symptoms: N/A  Would you like a call back, or a response through your [facility] portal?:   [facility]  Pharmacy name and phone # (copy from chart):   N/A  Comments:

## 2025-02-07 ENCOUNTER — HOSPITAL ENCOUNTER (OUTPATIENT)
Dept: RADIOLOGY | Facility: HOSPITAL | Age: 40
Discharge: HOME | End: 2025-02-07
Attending: INTERNAL MEDICINE
Payer: COMMERCIAL

## 2025-02-07 DIAGNOSIS — R76.12 POSITIVE QUANTIFERON-TB GOLD TEST: ICD-10-CM

## 2025-02-07 PROCEDURE — 71046 X-RAY EXAM CHEST 2 VIEWS: CPT | Mod: 26,,, | Performed by: RADIOLOGY

## 2025-02-07 PROCEDURE — 71046 X-RAY EXAM CHEST 2 VIEWS: CPT | Mod: TC,FY

## 2025-02-16 ENCOUNTER — PATIENT MESSAGE (OUTPATIENT)
Dept: FAMILY MEDICINE | Facility: CLINIC | Age: 40
End: 2025-02-16
Payer: COMMERCIAL

## 2025-02-28 ENCOUNTER — OFFICE VISIT (OUTPATIENT)
Dept: FAMILY MEDICINE | Facility: CLINIC | Age: 40
End: 2025-02-28
Payer: COMMERCIAL

## 2025-02-28 ENCOUNTER — PATIENT MESSAGE (OUTPATIENT)
Dept: BEHAVIORAL HEALTH | Facility: CLINIC | Age: 40
End: 2025-02-28
Payer: COMMERCIAL

## 2025-02-28 ENCOUNTER — TELEPHONE (OUTPATIENT)
Dept: BEHAVIORAL HEALTH | Facility: CLINIC | Age: 40
End: 2025-02-28
Payer: COMMERCIAL

## 2025-02-28 VITALS
HEART RATE: 130 BPM | HEIGHT: 65 IN | DIASTOLIC BLOOD PRESSURE: 90 MMHG | BODY MASS INDEX: 30.85 KG/M2 | WEIGHT: 185.19 LBS | OXYGEN SATURATION: 100 % | TEMPERATURE: 98 F | RESPIRATION RATE: 16 BRPM | SYSTOLIC BLOOD PRESSURE: 140 MMHG

## 2025-02-28 DIAGNOSIS — F98.8 ATTENTION DEFICIT DISORDER (ADD) IN ADULT: ICD-10-CM

## 2025-02-28 DIAGNOSIS — Z23 NEED FOR PNEUMOCOCCAL VACCINE: ICD-10-CM

## 2025-02-28 DIAGNOSIS — F43.23 ADJUSTMENT DISORDER WITH MIXED ANXIETY AND DEPRESSED MOOD: Primary | ICD-10-CM

## 2025-02-28 PROCEDURE — 99999 PR PBB SHADOW E&M-EST. PATIENT-LVL V: CPT | Mod: PBBFAC,,, | Performed by: INTERNAL MEDICINE

## 2025-02-28 NOTE — PROGRESS NOTES
Chief Complaint: Anxiety and Panic Attack      Daniela Dillon  is a 39 y.o. year old patient who presents today for     History of Present Illness    CHIEF COMPLAINT:  Daniela presents today for emotional distress related to workplace issues    MENTAL HEALTH:  She demonstrates tearfulness during the visit. She reports difficulty focusing with significant challenges in attention span that impact daily activities. She describes symptoms consistent with possible ADHD including constant fidgeting, inability to sit still, and mental exhaustion from thinking. 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    CURRENT ILLNESS:  She reports elevated blood pressure from her usual baseline of 110/unknown. She developed respiratory symptoms after exposure to viral illness from her son in early last week. While other household members have recovered, she continues to have persistent symptoms.    MEDICAL HISTORY:  She has a history of asthma and pneumonia. She underwent bilateral salpingectomy last year with approximately 3-4 weeks recovery period.      ROS:  General: -fever, -chills, -fatigue, -weight gain, -weight loss  Eyes: -vision changes, -redness, -discharge  ENT: -ear pain, -nasal congestion, -sore throat  Cardiovascular: -chest pain, -palpitations, -lower extremity edema  Respiratory: -cough, -shortness of breath  Gastrointestinal: -abdominal pain, -nausea, -vomiting, -diarrhea, -constipation, -blood in stool  Genitourinary: -dysuria, -hematuria, -frequency  Musculoskeletal: -joint pain, -muscle pain  Skin: -rash, -lesion  Neurological: -headache, -dizziness, -numbness, -tingling  Psychiatric: -anxiety, -depression, -sleep difficulty, +emotional lability         Past Medical History:   Diagnosis Date    Asthma        Past Surgical History:   Procedure Laterality Date    DILATION AND CURETTAGE OF UTERUS      2021 for polyp    LAPAROSCOPIC SALPINGECTOMY Bilateral 11/14/2023     Procedure: SALPINGECTOMY, LAPAROSCOPIC;  Surgeon: Yaa Slaughter MD;  Location: [facility] OR;  Service: OB/GYN;  Laterality: Bilateral;    THERMAL ABLATION OF ENDOMETRIUM USING HYSTEROSCOPY N/A 11/14/2023    Procedure: ABLATION, ENDOMETRIUM, THERMAL, HYSTEROSCOPIC;  Surgeon: Yaa Slaughter MD;  Location: [facility] OR;  Service: OB/GYN;  Laterality: N/A;  RN PREOP 10/25/2023 -- TYPE & SCREEN/---done----- HCG 11/11/23 --<1.2,  HOLOGIC EFRAÍN SHERMAN  [phone] NOTIFIED EFRAÍN ON 11/3/2023-LO        Family History   Problem Relation Name Age of Onset    Hodgkin's lymphoma Mother      Stroke Father      Hypertension Father      Drug abuse Father      Alcohol abuse Father      Endocrine tumor Sister      Lung cancer Paternal Grandmother          smoker    Fibroids Daughter dani     No Known Problems Son bria         Social History     Socioeconomic History    Marital status:    Tobacco Use    Smoking status: Never    Smokeless tobacco: Never   Substance and Sexual Activity    Alcohol use: Yes     Comment: social    Drug use: Yes     Types: Marijuana    Sexual activity: Yes     Partners: Male     Birth control/protection: None   Social History Narrative     since 2009    She is teacher at [employer].     Social Drivers of Health     Financial Resource Strain: Low Risk  (6/27/2024)    Overall Financial Resource Strain (CARDIA)     Difficulty of Paying Living Expenses: Not very hard   Food Insecurity: No Food Insecurity (6/27/2024)    Hunger Vital Sign     Worried About Running Out of Food in the Last Year: Never true     Ran Out of Food in the Last Year: Never true   Physical Activity: Sufficiently Active (6/27/2024)    Exercise Vital Sign     Days of Exercise per Week: 5 days     Minutes of Exercise per Session: 60 min   Stress: No Stress Concern Present (6/27/2024)    [employer] - Occupational Stress Questionnaire     Feeling of Stress : Only a little   Housing  Stability: Unknown (6/27/2024)    Housing Stability Vital Sign     Unable to Pay for Housing in the Last Year: No       Current Medications[1]           Objective:      Vitals:    02/28/25 0926   BP: (!) 140/90   Pulse: (!) 130   Resp: 16   Temp: 97.8 °F (36.6 °C)       Physical Exam  Constitutional:       Appearance: Normal appearance.   HENT:      Head: Normocephalic and atraumatic.      Nose: Congestion and rhinorrhea present.   Skin:     General: Skin is warm and dry.   Neurological:      General: No focal deficit present.      Mental Status: She is alert and oriented to person, place, and time.   Psychiatric:      Comments: Very tearful          Assessment:       1. Adjustment disorder with mixed anxiety and depressed mood    2. Attention deficit disorder (ADD) in adult    3. Need for pneumococcal vaccine          Plan:   1. Adjustment disorder with mixed anxiety and depressed mood  Assessment & Plan:  Refer to below. Related to stress at work    - FMLA  - therapy    Orders:  -     Ambulatory referral/consult to Primary Care Behavioral Health (Non-Opioids); Future; Expected date: 03/07/2025    2. Attention deficit disorder (ADD) in adult  Assessment & Plan:  Refer to below   Reports issues concentrating   - referral to psych    Orders:  -     Ambulatory referral/consult to Psychiatry; Future; Expected date: 03/07/2025    3. Need for pneumococcal vaccine  -     pneumoc 20-alexander conj-dip cr(PF) (PREVNAR-20 (PF)) injection Syrg 0.5 mL      ACUTE STRESS:  - Assessed the patient's mental health status and work-related stress, determining the need for FMLA leave and short-term therapy.  - Noted the patient's reports of feeling angry, underappreciated, and unable to focus, experiencing high blood pressure and emotional distress related to work situation.  - Observed elevated heart rate at 130 bpm during evaluation.  - Recommend FMLA leave for 2-4 weeks to address acute stress and allow time for therapy initiation.  -  Referred the patient to short-term therapy services for immediate mental health support.  - Instructed the patient to engage with short-term therapy services when contacted.  - Advised the patient to take time off work using FMLA leave to focus on mental health.  - Directed the patient to contact the office to submit FMLA paperwork for processing.    SUSPECTED ADHD:  - Noted the patient's reports of difficulty focusing, fidgeting, and hyperactivity.  - Deferred ADHD evaluation and medication management to future visit after initial therapy.  - Referred the patient to psychiatry for future ADHD evaluation.  - Instructed the patient to pursue ADHD evaluation after initial therapy sessions.  - Provided contact number for scheduling ADHD evaluation appointment.    PNEUMONIA VACCINATION:  - Noted the patient's report of recent illness with persistent symptoms.  - Noted the patient's history of asthma.  - Considered and administered pneumonia vaccination in office due to the patient's history of asthma.  - Noted the patient's history of pneumonia.  - Considered and administered pneumonia vaccination in office due to the patient's recent viral illness and history of pneumonia.    FOLLOW UP:  - Scheduled a follow-up visit in 2 weeks.         Total 40 mins spent on patient with more than 50% spent counseling    Follow up in about 2 weeks (around 3/14/2025) for f/up .    This note was generated with the assistance of ambient listening technology. Verbal consent was obtained by the patient and accompanying visitor(s) for the recording of patient appointment to facilitate this note. I attest to having reviewed and edited the generated note for accuracy, though some syntax or spelling errors may persist. Please contact the author of this note for any clarification.                  [1]   Current Outpatient Medications:     ADVAIR -21 mcg/actuation HFAA inhaler, Inhale 2 puffs into the lungs 2 (two) times daily., Disp: 12  g, Rfl: 3    albuterol (PROVENTIL/VENTOLIN HFA) 90 mcg/actuation inhaler, Inhale 2 puffs into the lungs every 6 (six) hours as needed., Disp: 18 g, Rfl: 3    cetirizine (ZYRTEC) 10 MG tablet, Take 10 mg by mouth once daily., Disp: , Rfl:     fluticasone propionate (FLONASE) 50 mcg/actuation nasal spray, 1 spray by Nasal route., Disp: , Rfl:     montelukast (SINGULAIR) 10 mg tablet, Take 10 mg by mouth every evening., Disp: , Rfl:     ruxolitinib 1.5 % Crea, Apply to hypopigmented spots daily, Disp: 60 g, Rfl: 5    Current Facility-Administered Medications:     triamcinolone acetonide injection 10 mg, 10 mg, Intradermal, 1 time in Clinic/HOD,

## 2025-02-28 NOTE — PROGRESS NOTES
Behavioral Health Community Health Worker  Initial Assessment  Completed by:  Clarice Apple    Date:  2/28/2025    Patient Enrollment in Behavioral Health Program:  Patient verbalized understanding of Behavioral Health Integration services to include:  Patient understands that CHW, LCSW, PharmD and consulting Psychiatrist are members of the care team working collaboratively with his/her primary care provider: Yes  Patient understands that activation of their [facility] patient portal account is required for accessing the full scope of team services: Yes  Patient understands that some counseling sessions may occur via video: Yes  Clinic visits with the psychiatrist may be subject to a co-pay based on your insurance: Yes  Patient consents to enroll in BHI program: Yes    Assessments     Single Item Health Literacy Scale:  How often do you need to have someone help you read instructions, pamphlets or other written material from your doctor or pharmacy?: Never    Promis 10:  Promis 10 Responses  In general, would you say your health is: Good  In general, would you say your quality of life is: Good  In general, how would you rate your physical health?: Good  In general, how would you rate your mental health, including your mood and your ability to think?: Poor  In general, how would you rate your satisfaction with your social activities and relationships?: Very good  In general, please rate how well you carry out your usual social activities and roles. (This includes activities at home, at work and in your community, and responsibilities as a parent, child, spouse, employee, friend, etc.): Very good  To what extent are you able to carry out your everyday physical activities such as walking, climbing stairs, carrying groceries, or moving a chair? : Completely  How often have you been bothered by emotional problems such as feeling anxious, depressed or irritable?: Often  In the past 7 days, how would you rate your fatigue  on average?: Mild  In the past 7 days, on a scale of 0 to 10 (where 0 is no pain and 10 is the worst pain imaginable) how would you rate your pain on average?: 3  Global Physical Health: 13  Global Mental health Score: 12    Depression PHQ:      2/28/2025    12:18 PM   PHQ9   Little interest or pleasure in doing things 1   Feeling down, depressed, or hopeless 3   Trouble falling or staying asleep, or sleeping too much 3   Feeling tired or having little energy 1   Poor appetite or overeating 3   Feeling bad about yourself - or that you are a failure or have let yourself or your family down 0   Trouble concentrating on things, such as reading the newspaper or watching television 3   Moving or speaking so slowly that other people could have noticed. Or the opposite - being so fidgety or restless that you have been moving around a lot more than usual 0   PHQ-9 Total Score 14         Generalized Anxiety Disorder 7-Item Scale:      2/28/2025    12:19 PM   GAD7   1. Feeling nervous, anxious, or on edge? 3   2. Not being able to stop or control worrying? 3   3. Worrying too much about different things? 3   4. Trouble relaxing? 3   5. Being so restless that it is hard to sit still? 3   6. Becoming easily annoyed or irritable? 3   7. Feeling afraid as if something awful might happen? 3   8. If you checked off any problems, how difficult have these problems made it for you to do your work, take care of things at home, or get along with other people? 3   NEHAL-7 Score 21       History     Social History     Socioeconomic History    Marital status:    Tobacco Use    Smoking status: Never    Smokeless tobacco: Never   Substance and Sexual Activity    Alcohol use: Yes     Comment: social    Drug use: Yes     Types: Marijuana    Sexual activity: Yes     Partners: Male     Birth control/protection: None   Social History Narrative     since 2009    She is teacher at [employer].     Social Drivers of Health  "    Financial Resource Strain: Low Risk  (6/27/2024)    Overall Financial Resource Strain (CARDIA)     Difficulty of Paying Living Expenses: Not very hard   Food Insecurity: No Food Insecurity (6/27/2024)    Hunger Vital Sign     Worried About Running Out of Food in the Last Year: Never true     Ran Out of Food in the Last Year: Never true   Physical Activity: Sufficiently Active (6/27/2024)    Exercise Vital Sign     Days of Exercise per Week: 5 days     Minutes of Exercise per Session: 60 min   Stress: No Stress Concern Present (6/27/2024)    [employer] - Occupational Stress Questionnaire     Feeling of Stress : Only a little   Housing Stability: Unknown (6/27/2024)    Housing Stability Vital Sign     Unable to Pay for Housing in the Last Year: No       Call Summary   Patient was referred to the BHI (Non-opioid) program by Primary Care Provider, Dr. Raquel WOODWARDW contacted Daniela Dillon who reports adjustment dissorder that limits [his/her] activities of daily living (ADLs).   Patient scored "14" on the PHQ9 and "21" on the NEHAL 7. Based on these scores patient is eligible for the Behavioral health Integration (Non-opioid) Program. CHW completed the intake and scheduled an appointment for patient with Lilia Khalil LCSW, on 5/8/25.          "

## 2025-03-05 ENCOUNTER — TELEPHONE (OUTPATIENT)
Dept: BEHAVIORAL HEALTH | Facility: CLINIC | Age: 40
End: 2025-03-05
Payer: COMMERCIAL

## 2025-03-10 ENCOUNTER — LAB VISIT (OUTPATIENT)
Dept: LAB | Facility: HOSPITAL | Age: 40
End: 2025-03-10
Payer: COMMERCIAL

## 2025-03-10 ENCOUNTER — PATIENT OUTREACH (OUTPATIENT)
Dept: ADMINISTRATIVE | Facility: HOSPITAL | Age: 40
End: 2025-03-10
Payer: COMMERCIAL

## 2025-03-10 ENCOUNTER — PATIENT MESSAGE (OUTPATIENT)
Dept: FAMILY MEDICINE | Facility: CLINIC | Age: 40
End: 2025-03-10

## 2025-03-10 DIAGNOSIS — R31.9 HEMATURIA, UNSPECIFIED TYPE: Primary | ICD-10-CM

## 2025-03-10 DIAGNOSIS — R31.9 HEMATURIA, UNSPECIFIED TYPE: ICD-10-CM

## 2025-03-10 LAB
BACTERIA #/AREA URNS AUTO: NORMAL /HPF
BILIRUB UR QL STRIP: NEGATIVE
CLARITY UR REFRACT.AUTO: CLEAR
COLOR UR AUTO: COLORLESS
GLUCOSE UR QL STRIP: NEGATIVE
HGB UR QL STRIP: ABNORMAL
KETONES UR QL STRIP: NEGATIVE
LEUKOCYTE ESTERASE UR QL STRIP: NEGATIVE
MICROSCOPIC COMMENT: NORMAL
NITRITE UR QL STRIP: NEGATIVE
PH UR STRIP: 6 [PH] (ref 5–8)
PROT UR QL STRIP: NEGATIVE
RBC #/AREA URNS AUTO: 1 /HPF (ref 0–4)
SP GR UR STRIP: 1 (ref 1–1.03)
URN SPEC COLLECT METH UR: ABNORMAL

## 2025-03-10 PROCEDURE — 81001 URINALYSIS AUTO W/SCOPE: CPT | Performed by: INTERNAL MEDICINE

## 2025-03-10 PROCEDURE — 87086 URINE CULTURE/COLONY COUNT: CPT | Performed by: INTERNAL MEDICINE

## 2025-03-10 NOTE — PROGRESS NOTES
Patient thinks she has an UTI, has blood in her urine. Dropped off a urine sample but was not able to stay for appt.

## 2025-03-10 NOTE — PROGRESS NOTES
Population Health Chart Review & Patient Outreach Details      Additional [facility] Health Notes:    **PLEASE RECHECK BLOOD PRESSURE OR SCHEDULE NURSE VISIT IF NOT IN RANGE DURING VISIT.   HM and immunization's reviewed and updated.    Place in visit note to advise.            Updates Requested / Reviewed:      Updated Care Coordination Note, Care Everywhere, and Care Team Updated         Health Maintenance Topics Overdue:      VBHM Score: 0     Patient is not due for any topics at this time.                       Health Maintenance Topic(s) Outreach Outcomes & Actions Taken:    Blood Pressure - Outreach Outcomes & Actions Taken  : Place in visit note to advise.

## 2025-03-11 ENCOUNTER — OFFICE VISIT (OUTPATIENT)
Dept: FAMILY MEDICINE | Facility: CLINIC | Age: 40
End: 2025-03-11
Payer: COMMERCIAL

## 2025-03-11 VITALS
WEIGHT: 187.63 LBS | DIASTOLIC BLOOD PRESSURE: 82 MMHG | OXYGEN SATURATION: 99 % | RESPIRATION RATE: 20 BRPM | HEIGHT: 65 IN | HEART RATE: 94 BPM | TEMPERATURE: 97 F | BODY MASS INDEX: 31.26 KG/M2 | SYSTOLIC BLOOD PRESSURE: 118 MMHG

## 2025-03-11 DIAGNOSIS — N89.8 VAGINAL IRRITATION: ICD-10-CM

## 2025-03-11 DIAGNOSIS — R39.9 UTI SYMPTOMS: Primary | ICD-10-CM

## 2025-03-11 PROCEDURE — 99214 OFFICE O/P EST MOD 30 MIN: CPT | Mod: S$GLB,,,

## 2025-03-11 PROCEDURE — 3008F BODY MASS INDEX DOCD: CPT | Mod: CPTII,S$GLB,,

## 2025-03-11 PROCEDURE — 99999 PR PBB SHADOW E&M-EST. PATIENT-LVL IV: CPT | Mod: PBBFAC,,,

## 2025-03-11 PROCEDURE — 3079F DIAST BP 80-89 MM HG: CPT | Mod: CPTII,S$GLB,,

## 2025-03-11 PROCEDURE — 3074F SYST BP LT 130 MM HG: CPT | Mod: CPTII,S$GLB,,

## 2025-03-11 PROCEDURE — G2211 COMPLEX E/M VISIT ADD ON: HCPCS | Mod: S$GLB,,,

## 2025-03-11 RX ORDER — PHENAZOPYRIDINE HYDROCHLORIDE 200 MG/1
200 TABLET, FILM COATED ORAL 3 TIMES DAILY PRN
Qty: 15 TABLET | Refills: 0 | Status: SHIPPED | OUTPATIENT
Start: 2025-03-11 | End: 2025-03-16

## 2025-03-11 RX ORDER — FLUCONAZOLE 150 MG/1
150 TABLET ORAL DAILY
Qty: 1 TABLET | Refills: 0 | Status: SHIPPED | OUTPATIENT
Start: 2025-03-11 | End: 2025-03-14 | Stop reason: ALTCHOICE

## 2025-03-11 RX ORDER — CIPROFLOXACIN 250 MG/1
250 TABLET, FILM COATED ORAL 2 TIMES DAILY
Qty: 10 TABLET | Refills: 0 | Status: SHIPPED | OUTPATIENT
Start: 2025-03-11 | End: 2025-03-16

## 2025-03-11 RX ORDER — KETOCONAZOLE 20 MG/G
CREAM TOPICAL DAILY
Qty: 30 G | Refills: 0 | Status: SHIPPED | OUTPATIENT
Start: 2025-03-11

## 2025-03-12 ENCOUNTER — RESULTS FOLLOW-UP (OUTPATIENT)
Dept: FAMILY MEDICINE | Facility: CLINIC | Age: 40
End: 2025-03-12

## 2025-03-12 LAB — BACTERIA UR CULT: NO GROWTH

## 2025-03-13 ENCOUNTER — OFFICE VISIT (OUTPATIENT)
Dept: BEHAVIORAL HEALTH | Facility: CLINIC | Age: 40
End: 2025-03-13

## 2025-03-13 DIAGNOSIS — F43.23 ADJUSTMENT DISORDER WITH MIXED ANXIETY AND DEPRESSED MOOD: ICD-10-CM

## 2025-03-13 PROCEDURE — 90791 PSYCH DIAGNOSTIC EVALUATION: CPT | Mod: 95,,,

## 2025-03-13 NOTE — PROGRESS NOTES
Primary Care Behavioral Health Integration: Initial  Diagnostic Interview - CPT 70093  Date:  3/13/2025  Referral Source:  Raquel Shepard MD  Length of Appointment: 60  The patient location is:    Address   [address] [id number]        The patient phone number is: [phone]  Visit type: Virtual visit with synchronous audio and video    Each patient to whom he or she provides medical services by telemedicine is:  (1) informed of the relationship between the physician and patient and the respective role of any other health care provider with respect to management of the patient; and (2) notified that he or she may decline to receive medical services by telemedicine and may withdraw from such care at any time.    Chief Complaint/Reason for Encounter:  Anxiety and Work Stress    History of Present Illness: Daniela Dillon, a 39 y.o. female referred by Raquel Shepard MD.  Patient was seen, examined and chart was reviewed. Met with patient. LCSW provided a description of the [facility] program, and reviewed confidentialty and limits to confidentiality. Pt's emergency contact information was verified.    Current Session:  Pt presented to [facility] program with chief complaint of work stress. 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 Pt's interactions with school administrators led her to file a workplace grievance with HR. Current work stress has resulted in pt experiencing excessive worry, heightened anxiety, sleep difficulties, low mood, feelings of guilt, mild anhedonia, and difficulty concentrating. Pt met with her PCP and requested FMLA leave due to the negative impact work stress has had on her mental health, in addition to significant elevations in her blood pressure and increase in frequency of asthma attacks. Pt is currently on FMLA leave and considering her next options. 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 Pt is unsure if she will be able to return to her job before the end of the school year at this time, due to continued stress and anxiety while on FMLA leave.     Pt is  with children and reported having a good support system. Pt is from [place] and spent the last several years living in [place]. Pt moved back to be closer to her family. Pt reported nursing school is going well and she is excited about future career opportunities. Pt reported her primary treatment goal is to "let go" of anxiety regarding work. Pt plans to read psychoeducation on anxiety. Next session, review mindfulness and relaxation techniques, provide introduction to CBT.       Past Medical History:   Diagnosis Date    Asthma        Current Medications[1]    Current symptoms:  Depression Symptoms: dysphoric mood, anhedonia, worthlessness/guilt, and difficulty concentrating.  Anxiety Symptoms: excessive worrying, possible panic attacks. Pt reported two incidents during which she felt extremely anxious and began hyperventilating   Sleep Difficulties: frequent night time awakening on nights before work.   Manic Symptoms:  denies.  Psychosis: denies .    Risk assessment:  Patient reports no suicidal ideation  Patient reports no homicidal ideation  Patient reports no self-injurious behavior  Patient reports no violent behavior    Patient advised to call [phone] or present the the nearest ED if they experience suicidal or homicidal ideation, plan or intent.      Psychiatric History:  Diagnosis: None   Current Psychiatric Medication: No. They are not interested in medication changes.   Medication Trial History: Medication Trials: No    Outpatient Treatment: No   Inpatient Treatment: No   Suicide Attempts: No prior thoughts or attempts   Access to Firearms: Yes - owns a gun, not assembled and locked up    History of Trauma: No   Family Psychiatric History: None     Current and Past Substance Use:  Alcohol: Social alcohol " use.   Drugs: Denied.   Nicotine: denied   Caffeine:  Occasional caffeine use     Mental Status Exam  General Appearance:  appears stated age, neatly dressed, well groomed   Speech: normal tone, normal rate, normal pitch, normal volume      Level of Cooperation: cooperative      Thought Processes: linear, logical, goal-directed   Mood: euthymic      Thought Content: {relevant and appropriate   Affect: congruent and appropriate   Orientation: Oriented x4   Memory/Attention/Concentration: No gross cognitive deficits made evident during conversation   Judgment & Insight: good   Language  intact         3/13/2025     1:58 PM   Results of the PHQ8   Little interest or pleasure in doing things More than half the days   Feeling down, depressed, or hopeless Several days   Trouble falling or staying asleep, or sleeping too much Several days   Feeling tired or having little energy Several days   Poor appetite or overeating Several days   Feeling bad about yourself - or that you are a failure or have let yourself or your family down Not at all   Trouble concentrating on things, such as reading the newspaper or watching television More than half the days   Moving or speaking so slowly that other people could have noticed. Or the opposite - being so fidgety or restless that you have been moving around a lot more than usual Not at all   Total Score  8           3/13/2025     1:57 PM 2/28/2025    12:19 PM   GAD7   1. Feeling nervous, anxious, or on edge? 1 3   2. Not being able to stop or control worrying? 1 3   3. Worrying too much about different things? 1 3   4. Trouble relaxing? 1 3   5. Being so restless that it is hard to sit still? 1 3   6. Becoming easily annoyed or irritable? 1 3   7. Feeling afraid as if something awful might happen? 1 3   8. If you checked off any problems, how difficult have these problems made it for you to do your work, take care of things at home, or get along with other people? 1 3   NEHAL-7 Score 7   21       Patient-reported       Impression: Initial appointment focused on gathering history, identifying treatment goals and developing a treatment plan.      Diagnosis:  1. Adjustment disorder with mixed anxiety and depressed mood  Ambulatory referral/consult to Primary Care Behavioral Health (Non-Opioids)          Treatment Goals and Plan:   Anxiety: reducing negative automatic thoughts, reducing physical symptoms of anxiety, and reducing time spent worrying (<30 minutes/day)    Future treatment will utilize CBT, Mindfulness Techniques, and Solution-focused Therapy.      Return to Clinic: 1 week         [1]   Current Outpatient Medications:     ADVAIR -21 mcg/actuation HFAA inhaler, Inhale 2 puffs into the lungs 2 (two) times daily., Disp: 12 g, Rfl: 3    albuterol (PROVENTIL/VENTOLIN HFA) 90 mcg/actuation inhaler, Inhale 2 puffs into the lungs every 6 (six) hours as needed., Disp: 18 g, Rfl: 3    cetirizine (ZYRTEC) 10 MG tablet, Take 10 mg by mouth once daily., Disp: , Rfl:     ciprofloxacin HCl (CIPRO) 250 MG tablet, Take 1 tablet (250 mg total) by mouth 2 (two) times daily. for 5 days, Disp: 10 tablet, Rfl: 0    fluticasone propionate (FLONASE) 50 mcg/actuation nasal spray, 1 spray by Nasal route., Disp: , Rfl:     ketoconazole (NIZORAL) 2 % cream, Apply topically once daily., Disp: 30 g, Rfl: 0    montelukast (SINGULAIR) 10 mg tablet, Take 10 mg by mouth every evening., Disp: , Rfl:     phenazopyridine (PYRIDIUM) 200 MG tablet, Take 1 tablet (200 mg total) by mouth 3 (three) times daily as needed for Pain., Disp: 15 tablet, Rfl: 0    ruxolitinib 1.5 % Crea, Apply to hypopigmented spots daily, Disp: 60 g, Rfl: 5    Current Facility-Administered Medications:     triamcinolone acetonide injection 10 mg, 10 mg, Intradermal, 1 time in Clinic/HOD,

## 2025-03-14 ENCOUNTER — LAB VISIT (OUTPATIENT)
Dept: LAB | Facility: HOSPITAL | Age: 40
End: 2025-03-14
Attending: INTERNAL MEDICINE
Payer: COMMERCIAL

## 2025-03-14 ENCOUNTER — OFFICE VISIT (OUTPATIENT)
Dept: OBSTETRICS AND GYNECOLOGY | Facility: CLINIC | Age: 40
End: 2025-03-14
Payer: COMMERCIAL

## 2025-03-14 ENCOUNTER — RESULTS FOLLOW-UP (OUTPATIENT)
Dept: FAMILY MEDICINE | Facility: CLINIC | Age: 40
End: 2025-03-14

## 2025-03-14 ENCOUNTER — OFFICE VISIT (OUTPATIENT)
Dept: FAMILY MEDICINE | Facility: CLINIC | Age: 40
End: 2025-03-14
Payer: COMMERCIAL

## 2025-03-14 ENCOUNTER — PATIENT MESSAGE (OUTPATIENT)
Dept: BEHAVIORAL HEALTH | Facility: CLINIC | Age: 40
End: 2025-03-14
Payer: COMMERCIAL

## 2025-03-14 ENCOUNTER — PATIENT MESSAGE (OUTPATIENT)
Dept: FAMILY MEDICINE | Facility: CLINIC | Age: 40
End: 2025-03-14

## 2025-03-14 VITALS
SYSTOLIC BLOOD PRESSURE: 118 MMHG | WEIGHT: 185.19 LBS | BODY MASS INDEX: 30.82 KG/M2 | DIASTOLIC BLOOD PRESSURE: 72 MMHG

## 2025-03-14 VITALS
OXYGEN SATURATION: 98 % | SYSTOLIC BLOOD PRESSURE: 114 MMHG | HEART RATE: 85 BPM | DIASTOLIC BLOOD PRESSURE: 78 MMHG | WEIGHT: 185.63 LBS | BODY MASS INDEX: 30.93 KG/M2 | TEMPERATURE: 98 F | RESPIRATION RATE: 18 BRPM | HEIGHT: 65 IN

## 2025-03-14 DIAGNOSIS — F41.0 PANIC ATTACKS: ICD-10-CM

## 2025-03-14 DIAGNOSIS — N93.9 ABNORMAL UTERINE BLEEDING (AUB): ICD-10-CM

## 2025-03-14 DIAGNOSIS — Z00.00 ENCOUNTER FOR ANNUAL PHYSICAL EXAM: ICD-10-CM

## 2025-03-14 DIAGNOSIS — F43.23 ADJUSTMENT DISORDER WITH MIXED ANXIETY AND DEPRESSED MOOD: Primary | ICD-10-CM

## 2025-03-14 DIAGNOSIS — Z00.00 ROUTINE GENERAL MEDICAL EXAMINATION AT A HEALTH CARE FACILITY: ICD-10-CM

## 2025-03-14 DIAGNOSIS — Z01.419 WOMEN'S ANNUAL ROUTINE GYNECOLOGICAL EXAMINATION: Primary | ICD-10-CM

## 2025-03-14 DIAGNOSIS — Z00.00 ROUTINE GENERAL MEDICAL EXAMINATION AT A HEALTH CARE FACILITY: Primary | ICD-10-CM

## 2025-03-14 DIAGNOSIS — Z12.4 ENCOUNTER FOR PAPANICOLAOU SMEAR FOR CERVICAL CANCER SCREENING: ICD-10-CM

## 2025-03-14 DIAGNOSIS — Z11.51 SCREENING FOR HUMAN PAPILLOMAVIRUS (HPV): ICD-10-CM

## 2025-03-14 DIAGNOSIS — Z12.31 ENCOUNTER FOR SCREENING MAMMOGRAM FOR BREAST CANCER: ICD-10-CM

## 2025-03-14 PROBLEM — Z02.0 SCHOOL PHYSICAL EXAM: Status: RESOLVED | Noted: 2025-01-24 | Resolved: 2025-03-14

## 2025-03-14 LAB
ALBUMIN SERPL BCP-MCNC: 4.3 G/DL (ref 3.5–5.2)
ALP SERPL-CCNC: 76 U/L (ref 40–150)
ALT SERPL W/O P-5'-P-CCNC: 18 U/L (ref 10–44)
ANION GAP SERPL CALC-SCNC: 7 MMOL/L (ref 8–16)
AST SERPL-CCNC: 20 U/L (ref 10–40)
BASOPHILS # BLD AUTO: 0.02 K/UL (ref 0–0.2)
BASOPHILS NFR BLD: 0.3 % (ref 0–1.9)
BILIRUB SERPL-MCNC: 0.5 MG/DL (ref 0.1–1)
BUN SERPL-MCNC: 11 MG/DL (ref 6–20)
CALCIUM SERPL-MCNC: 8.9 MG/DL (ref 8.7–10.5)
CHLORIDE SERPL-SCNC: 105 MMOL/L (ref 95–110)
CHOLEST SERPL-MCNC: 170 MG/DL (ref 120–199)
CHOLEST/HDLC SERPL: 3.8 {RATIO} (ref 2–5)
CO2 SERPL-SCNC: 24 MMOL/L (ref 23–29)
CREAT SERPL-MCNC: 0.8 MG/DL (ref 0.5–1.4)
DIFFERENTIAL METHOD BLD: NORMAL
EOSINOPHIL # BLD AUTO: 0.3 K/UL (ref 0–0.5)
EOSINOPHIL NFR BLD: 4.1 % (ref 0–8)
ERYTHROCYTE [DISTWIDTH] IN BLOOD BY AUTOMATED COUNT: 12.8 % (ref 11.5–14.5)
EST. GFR  (NO RACE VARIABLE): >60 ML/MIN/1.73 M^2
GLUCOSE SERPL-MCNC: 86 MG/DL (ref 70–110)
HCT VFR BLD AUTO: 37.6 % (ref 37–48.5)
HDLC SERPL-MCNC: 45 MG/DL (ref 40–75)
HDLC SERPL: 26.5 % (ref 20–50)
HGB BLD-MCNC: 12.2 G/DL (ref 12–16)
IMM GRANULOCYTES # BLD AUTO: 0.02 K/UL (ref 0–0.04)
IMM GRANULOCYTES NFR BLD AUTO: 0.3 % (ref 0–0.5)
LDLC SERPL CALC-MCNC: 107 MG/DL (ref 63–159)
LYMPHOCYTES # BLD AUTO: 2 K/UL (ref 1–4.8)
LYMPHOCYTES NFR BLD: 27.6 % (ref 18–48)
MCH RBC QN AUTO: 28.6 PG (ref 27–31)
MCHC RBC AUTO-ENTMCNC: 32.4 G/DL (ref 32–36)
MCV RBC AUTO: 88 FL (ref 82–98)
MONOCYTES # BLD AUTO: 0.5 K/UL (ref 0.3–1)
MONOCYTES NFR BLD: 7.1 % (ref 4–15)
NEUTROPHILS # BLD AUTO: 4.3 K/UL (ref 1.8–7.7)
NEUTROPHILS NFR BLD: 60.6 % (ref 38–73)
NONHDLC SERPL-MCNC: 125 MG/DL
NRBC BLD-RTO: 0 /100 WBC
PLATELET # BLD AUTO: 254 K/UL (ref 150–450)
PMV BLD AUTO: 11.5 FL (ref 9.2–12.9)
POTASSIUM SERPL-SCNC: 3.7 MMOL/L (ref 3.5–5.1)
PROT SERPL-MCNC: 7.7 G/DL (ref 6–8.4)
RBC # BLD AUTO: 4.26 M/UL (ref 4–5.4)
SODIUM SERPL-SCNC: 136 MMOL/L (ref 136–145)
TRIGL SERPL-MCNC: 90 MG/DL (ref 30–150)
TSH SERPL DL<=0.005 MIU/L-ACNC: 1.74 UIU/ML (ref 0.4–4)
WBC # BLD AUTO: 7.06 K/UL (ref 3.9–12.7)

## 2025-03-14 PROCEDURE — 99999 PR PBB SHADOW E&M-EST. PATIENT-LVL V: CPT | Mod: PBBFAC,,, | Performed by: INTERNAL MEDICINE

## 2025-03-14 PROCEDURE — 85025 COMPLETE CBC W/AUTO DIFF WBC: CPT | Performed by: INTERNAL MEDICINE

## 2025-03-14 PROCEDURE — 99999 PR PBB SHADOW E&M-EST. PATIENT-LVL III: CPT | Mod: PBBFAC,,, | Performed by: PHYSICIAN ASSISTANT

## 2025-03-14 PROCEDURE — 84443 ASSAY THYROID STIM HORMONE: CPT | Performed by: INTERNAL MEDICINE

## 2025-03-14 PROCEDURE — 80053 COMPREHEN METABOLIC PANEL: CPT | Performed by: INTERNAL MEDICINE

## 2025-03-14 PROCEDURE — 80061 LIPID PANEL: CPT | Performed by: INTERNAL MEDICINE

## 2025-03-14 PROCEDURE — 83036 HEMOGLOBIN GLYCOSYLATED A1C: CPT | Performed by: INTERNAL MEDICINE

## 2025-03-14 RX ORDER — HYDROCODONE BITARTRATE AND ACETAMINOPHEN 7.5; 325 MG/1; MG/1
1 TABLET ORAL
COMMUNITY
Start: 2024-10-31 | End: 2025-03-14

## 2025-03-14 RX ORDER — AMOXICILLIN 500 MG/1
TABLET, FILM COATED ORAL
COMMUNITY
Start: 2024-11-23 | End: 2025-03-14 | Stop reason: ALTCHOICE

## 2025-03-14 NOTE — PROGRESS NOTES
Chief Complaint: Follow-up (2 week follow up. Patient would like to discuss FMLA forms )      Daniela Dillon  is a 39 y.o. year old patient who presents today for     History of Present Illness    CHIEF COMPLAINT:  Daniela presents today for follow up of elevated blood pressure and urinary symptoms.    URINARY SYMPTOMS:  She reports a recent episode of urinary symptoms beginning Saturday with pelvic pressure, initially attributed to dietary changes. By , she developed frequent urination and blood in urine, progressing from light pink when wiping to visible blood clots in the toilet. She self-treated with leftover antibiotics from October (two doses on ) and consumed cranberry juice. This episode was similar to her first kidney infection experienced at the end of the previous school year. Currently, she reports symptom improvement with no ongoing pain or hematuria since  night after taking the antibiotics.    SURGICAL HISTORY:  She has history of bilateral salpingo-oophorectomy to stop menstruation. She reports unexpected post-surgical vaginal bleeding which has recurred. Her last surgery was complicated by delayed recovery resulting in short-term disability.    MENTAL HEALTH:  She reports experiencing panic attacks. She is currently under the care of a therapist with whom she has scheduled an earlier appointment than initially planned.    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 She reports recent family stressors including her 's job loss due to unexpected school closure in May, leading to financial strain with mortgage payments. Her mother-in-law recently entered hospice care and passed away, requiring travel to [place] for the .      ROS:  General: -fever, -chills, -fatigue, -weight gain, -weight loss, +sleep disturbances  Eyes: -vision changes, -redness, -discharge  ENT: -ear pain, -nasal congestion, -sore  throat  Cardiovascular: -chest pain, -palpitations, -lower extremity edema  Respiratory: -cough, -shortness of breath  Gastrointestinal: -abdominal pain, -nausea, -vomiting, -diarrhea, -constipation, -blood in stool  Genitourinary: -dysuria, -hematuria, -frequency, +excessive urination, +pelvic pressure  Musculoskeletal: -joint pain, -muscle pain  Skin: -rash, -lesion  Neurological: -headache, -dizziness, -numbness, -tingling  Psychiatric: +anxiety, -depression, -sleep difficulty, +panic attacks         Past Medical History:   Diagnosis Date    Asthma        Past Surgical History:   Procedure Laterality Date    DILATION AND CURETTAGE OF UTERUS      2021 for polyp    LAPAROSCOPIC SALPINGECTOMY Bilateral 11/14/2023    Procedure: SALPINGECTOMY, LAPAROSCOPIC;  Surgeon: Yaa Slaughter MD;  Location: [facility] OR;  Service: OB/GYN;  Laterality: Bilateral;    THERMAL ABLATION OF ENDOMETRIUM USING HYSTEROSCOPY N/A 11/14/2023    Procedure: ABLATION, ENDOMETRIUM, THERMAL, HYSTEROSCOPIC;  Surgeon: Yaa Slaughter MD;  Location: [facility] OR;  Service: OB/GYN;  Laterality: N/A;  RN PREOP 10/25/2023 -- TYPE & SCREEN/---done----- HCG 11/11/23 --<1.2,  TANVI SHERMAN  [phone] NOTIFIED EFRAÍN ON 11/3/2023-LO        Family History   Problem Relation Name Age of Onset    Hodgkin's lymphoma Mother      Stroke Father      Hypertension Father      Drug abuse Father      Alcohol abuse Father      Endocrine tumor Sister      Lung cancer Paternal Grandmother          smoker    Fibroids Daughter dani     No Known Problems Son bria         Social History     Socioeconomic History    Marital status:    Tobacco Use    Smoking status: Never    Smokeless tobacco: Never   Substance and Sexual Activity    Alcohol use: Yes     Comment: social    Drug use: Yes     Types: Marijuana    Sexual activity: Yes     Partners: Male     Birth control/protection: None   Social History Narrative     since 2009    She is teacher at  [employer].     Social Drivers of Health     Financial Resource Strain: Low Risk  (3/13/2025)    Overall Financial Resource Strain (CARDIA)     Difficulty of Paying Living Expenses: Not very hard   Food Insecurity: No Food Insecurity (3/13/2025)    Hunger Vital Sign     Worried About Running Out of Food in the Last Year: Never true     Ran Out of Food in the Last Year: Never true   Transportation Needs: No Transportation Needs (3/13/2025)    PRAPARE - Transportation     Lack of Transportation (Medical): No     Lack of Transportation (Non-Medical): No   Physical Activity: Sufficiently Active (3/13/2025)    Exercise Vital Sign     Days of Exercise per Week: 4 days     Minutes of Exercise per Session: 60 min   Stress: Stress Concern Present (3/13/2025)    [employer] - Occupational Stress Questionnaire     Feeling of Stress : Rather much   Housing Stability: Low Risk  (3/13/2025)    Housing Stability Vital Sign     Unable to Pay for Housing in the Last Year: No     Number of Times Moved in the Last Year: 0     Homeless in the Last Year: No       Current Medications[1]           Objective:      Vitals:    03/14/25 0742   BP: 114/78   Pulse: 85   Resp: 18   Temp: 97.8 °F (36.6 °C)       Physical Exam  Constitutional:       Appearance: Normal appearance.   HENT:      Head: Normocephalic and atraumatic.   Skin:     General: Skin is warm and dry.   Neurological:      General: No focal deficit present.      Mental Status: She is alert and oriented to person, place, and time.   Psychiatric:         Mood and Affect: Mood normal.         Behavior: Behavior normal.          Assessment:       1. Adjustment disorder with mixed anxiety and depressed mood    2. Panic attacks    3. Encounter for screening mammogram for breast cancer    4. Encounter for annual physical exam          Plan:   1. Adjustment disorder with mixed anxiety and depressed mood  Assessment & Plan:  - Daniela is taking time off  work (FMLA) to address stress and anxiety.  - Evaluated the need for continuous time off work due to anxiety.  - Daniela reported experiencing stress and anxiety related to work and personal life.  - Discussed anxiety with therapist and requested help with coping mechanisms.  - Scheduled an earlier appointment with therapist to address anxiety.       2. Panic attacks  Assessment & Plan:  - Daniela reported experiencing full-on panic attacks.  - Discussed panic attacks with therapist and requested help with coping mechanisms.  - Scheduled an earlier appointment with therapist to address panic attacks.      3. Encounter for screening mammogram for breast cancer  -     Mammo Digital Screening Bilat w/ Dennis (XPD); Future; Expected date: 06/07/2025    4. Encounter for annual physical exam  -     Lipid Panel; Future; Expected date: 03/14/2025  -     TSH; Future; Expected date: 03/14/2025  -     Hemoglobin A1C; Future; Expected date: 03/14/2025  -     Comprehensive Metabolic Panel; Future; Expected date: 03/14/2025  -     CBC Auto Differential; Future; Expected date: 03/14/2025       URINARY TRACT INFECTION:  - Assessed recent urinary symptoms, noting resolution after self-administered antibiotics.  - Daniela reported blood in urine, pain, and frequent urge to urinate on Sunday, with blood clots observed in urine.  - Daniela is no longer experiencing pain or hematuria after taking antibiotics.  - Daniela self-medicated with leftover antibiotics from a previous root canal treatment.  - Daniela self-medicated with leftover antibiotics and consumed cranberry juice.    HEMATURIA:  - Daniela reported blood in urine, pain, and frequent urge to urinate on Sunday, with blood clots observed in urine.  - Urinalysis showed occult blood (2+) but no actual RBCs.  - Daniela reported hematuria, including blood clots, on Sunday night.  - Daniela is no longer experiencing hematuria after taking antibiotics.  - Considered the  possibility of a kidney stone passing.    HIGH BP:  - Evaluated the need for continuous time off work due to stress-related hypertension.  - Daniela had elevated blood pressure during the last visit.  - Acknowledged the high blood pressure from the previous visit.  - Submitted [facility] request for time off due to work-related stress and hypertension.        Total 40 mins spent on patient with more than 50% spent counseling and paperwork    Follow up in about 4 weeks (around 4/11/2025) for mammo, annual.    This note was generated with the assistance of ambient listening technology. Verbal consent was obtained by the patient and accompanying visitor(s) for the recording of patient appointment to facilitate this note. I attest to having reviewed and edited the generated note for accuracy, though some syntax or spelling errors may persist. Please contact the author of this note for any clarification.                [1]   Current Outpatient Medications:     ADVAIR -21 mcg/actuation HFAA inhaler, Inhale 2 puffs into the lungs 2 (two) times daily., Disp: 12 g, Rfl: 3    albuterol (PROVENTIL/VENTOLIN HFA) 90 mcg/actuation inhaler, Inhale 2 puffs into the lungs every 6 (six) hours as needed., Disp: 18 g, Rfl: 3    cetirizine (ZYRTEC) 10 MG tablet, Take 10 mg by mouth once daily., Disp: , Rfl:     ciprofloxacin HCl (CIPRO) 250 MG tablet, Take 1 tablet (250 mg total) by mouth 2 (two) times daily. for 5 days, Disp: 10 tablet, Rfl: 0    fluticasone propionate (FLONASE) 50 mcg/actuation nasal spray, 1 spray by Nasal route., Disp: , Rfl:     ketoconazole (NIZORAL) 2 % cream, Apply topically once daily., Disp: 30 g, Rfl: 0    montelukast (SINGULAIR) 10 mg tablet, Take 10 mg by mouth every evening., Disp: , Rfl:     phenazopyridine (PYRIDIUM) 200 MG tablet, Take 1 tablet (200 mg total) by mouth 3 (three) times daily as needed for Pain., Disp: 15 tablet, Rfl: 0    ruxolitinib 1.5 % Crea, Apply to hypopigmented spots daily, Disp:  60 g, Rfl: 5    Current Facility-Administered Medications:     triamcinolone acetonide injection 10 mg, 10 mg, Intradermal, 1 time in Clinic/HOD,

## 2025-03-14 NOTE — ASSESSMENT & PLAN NOTE
- Daniela is taking time off work (FMLA) to address stress and anxiety.  - Evaluated the need for continuous time off work due to anxiety.  - Daniela reported experiencing stress and anxiety related to work and personal life.  - Discussed anxiety with therapist and requested help with coping mechanisms.  - Scheduled an earlier appointment with therapist to address anxiety.

## 2025-03-14 NOTE — ASSESSMENT & PLAN NOTE
- Daniela reported experiencing full-on panic attacks.  - Discussed panic attacks with therapist and requested help with coping mechanisms.  - Scheduled an earlier appointment with therapist to address panic attacks.

## 2025-03-14 NOTE — PROGRESS NOTES
HISTORY OF PRESENT ILLNESS:    Daniela Dillon is a 39 y.o. female, , No LMP recorded. Patient has had an ablation.,  presents for a routine exam. SHE IS S/P novarsure endometrial ablation and lap BS on 23. She uses BTL for contraception. She does not want STD screening.  Reports GYN complaints.  Reports initially having pelvic pain, UTI symptoms and hematuria earlier this week - now on antibiotics. Noted unexpected vaginal bleeding today - described as a light period. She is concerned as she had an ablation and has not had cycles since. She is stressed out with school and work at this time.   The patient participates in regular exercise: Yes.  The patient does not smoke.  The patient wears seatbelts.   Pt denies any domestic violence.     SCREENING:   Pap:  NILM - HPV   Mammogram:  , TC Score: 8.73 %   Colonoscopy: N/A   DEXA:  N/A       Past Medical History:   Diagnosis Date    Asthma        Past Surgical History:   Procedure Laterality Date    DILATION AND CURETTAGE OF UTERUS       for polyp    LAPAROSCOPIC SALPINGECTOMY Bilateral 2023    Procedure: SALPINGECTOMY, LAPAROSCOPIC;  Surgeon: Yaa Slaughter MD;  Location: [facility] OR;  Service: OB/GYN;  Laterality: Bilateral;    THERMAL ABLATION OF ENDOMETRIUM USING HYSTEROSCOPY N/A 2023    Procedure: ABLATION, ENDOMETRIUM, THERMAL, HYSTEROSCOPIC;  Surgeon: Yaa Slaughter MD;  Location: [facility] OR;  Service: OB/GYN;  Laterality: N/A;  RN PREOP 10/25/2023 -- TYPE & SCREEN/---done----- HCG 23 --<1.2,  [facility]  [phone] NOTIFIED EFRAÍN ON 11/3/2023-LO       MEDICATIONS AND ALLERGIES:    Current Medications[1]    Review of patient's allergies indicates:   Allergen Reactions    Grass pollen- grass standard Hives     ITCHING, RUNNY NOSE    Mycobacterium tuberculosis (tuberculin ppd) Rash       Social History[2]    COMPREHENSIVE GYN HISTORY:    PAP History: Denies abnormal Paps.  Infection History:  Denies STDs. Denies PID.  Benign History: Denies uterine fibroids. Denies ovarian cysts. Denies endometriosis. Denies other conditions.  Cancer History: Denies cervical cancer. Denies uterine cancer or hyperplasia. Denies ovarian cancer. Denies vulvar cancer or pre-cancer. Denies vaginal cancer or pre-cancer. Denies breast cancer. Denies colon cancer.  Sexual Activity History: Reports currently being sexually active  Menstrual History: Monthly, mild-moderate.  Contraception:bilateral tubal ligation    ROS:  GENERAL: No weight changes. No swelling. No fatigue. No fever.  CARDIOVASCULAR: No chest pain. No shortness of breath. No leg cramps.   NEUROLOGICAL: No headaches. No vision changes.  BREASTS: No pain. No lumps. No discharge.  ABDOMEN: No pain. No nausea. No vomiting. No diarrhea. No constipation.  REPRODUCTIVE: No abnormal bleeding.   VULVA: No pain. No lesions. No itching.  VAGINA: No relaxation. No itching. No odor. No discharge. No lesions.  URINARY: No incontinence. No nocturia. No frequency. No dysuria.    /72   Wt 84 kg (185 lb 3 oz)   BMI 30.82 kg/m²     PE:  APPEARANCE: Well nourished, well developed, in no acute distress.  AFFECT: WNL, alert and oriented x 3.  SKIN: No acne or hirsutism.  NECK: Neck symmetric, without masses or thyromegaly.  NODES: No inguinal, cervical, axillary or femoral lymph node enlargement.  CHEST: Good respiratory effort.   ABDOMEN: Soft. No tenderness or masses. No hepatosplenomegaly. No hernias.  PELVIC: External female genitalia without lesions.  Female hair distribution. Adequate perineal body, Normal urethral meatus. Vagina moist and well rugated without lesions or discharge + blood.  No significant cystocele or rectocele present. Cervix pink without lesions, discharge or tenderness + nabothian cysts. Uterus is normal size, regular, mobile and nontender. Adnexa without masses or tenderness.  EXTREMITIES: No edema      DIAGNOSIS:  1. Women's annual routine  gynecological examination        2. Encounter for Papanicolaou smear for cervical cancer screening  Liquid-Based Pap Smear, Screening      3. Screening for human papillomavirus (HPV)  HPV High Risk Genotypes, PCR      4. Abnormal uterine bleeding (AUB)  US Pelvis Comp with Transvag NON-OB (xpd    POCT urine pregnancy    Liquid-Based Pap Smear, Screening    HPV High Risk Genotypes, PCR          PLAN:  - Pap and HPV done today.  - Screening tests as ordered.  - Diet and exercise encouraged.  Seat belt use encouraged.  Reviewed ASCCP Pap guidelines and screening recommendations.    Counseling:   - PUS and RTC for EBX. Discussed occasionally ut. Ablations fail, this may be her cycle   FOLLOW-UP with me annually.   Crystal Brambila PA-C           [1]   Current Outpatient Medications:     ADVAIR -21 mcg/actuation HFAA inhaler, Inhale 2 puffs into the lungs 2 (two) times daily., Disp: 12 g, Rfl: 3    albuterol (PROVENTIL/VENTOLIN HFA) 90 mcg/actuation inhaler, Inhale 2 puffs into the lungs every 6 (six) hours as needed., Disp: 18 g, Rfl: 3    cetirizine (ZYRTEC) 10 MG tablet, Take 10 mg by mouth once daily., Disp: , Rfl:     ciprofloxacin HCl (CIPRO) 250 MG tablet, Take 1 tablet (250 mg total) by mouth 2 (two) times daily. for 5 days, Disp: 10 tablet, Rfl: 0    fluticasone propionate (FLONASE) 50 mcg/actuation nasal spray, 1 spray by Nasal route., Disp: , Rfl:     ketoconazole (NIZORAL) 2 % cream, Apply topically once daily., Disp: 30 g, Rfl: 0    montelukast (SINGULAIR) 10 mg tablet, Take 10 mg by mouth every evening., Disp: , Rfl:     phenazopyridine (PYRIDIUM) 200 MG tablet, Take 1 tablet (200 mg total) by mouth 3 (three) times daily as needed for Pain., Disp: 15 tablet, Rfl: 0    ruxolitinib 1.5 % Crea, Apply to hypopigmented spots daily, Disp: 60 g, Rfl: 5    Current Facility-Administered Medications:     triamcinolone acetonide injection 10 mg, 10 mg, Intradermal, 1 time in Clinic/HOD,   [2]   Social  History  Socioeconomic History    Marital status:    Tobacco Use    Smoking status: Never    Smokeless tobacco: Never   Substance and Sexual Activity    Alcohol use: Yes     Comment: social    Drug use: Yes     Types: Marijuana    Sexual activity: Yes     Partners: Male     Birth control/protection: None   Social History Narrative     since 2009    She is teacher at [employer].     Social Drivers of Health     Financial Resource Strain: Low Risk  (3/13/2025)    Overall Financial Resource Strain (CARDIA)     Difficulty of Paying Living Expenses: Not very hard   Food Insecurity: No Food Insecurity (3/13/2025)    Hunger Vital Sign     Worried About Running Out of Food in the Last Year: Never true     Ran Out of Food in the Last Year: Never true   Transportation Needs: No Transportation Needs (3/13/2025)    PRAPARE - Transportation     Lack of Transportation (Medical): No     Lack of Transportation (Non-Medical): No   Physical Activity: Sufficiently Active (3/13/2025)    Exercise Vital Sign     Days of Exercise per Week: 4 days     Minutes of Exercise per Session: 60 min   Stress: Stress Concern Present (3/13/2025)    [employer] - Occupational Stress Questionnaire     Feeling of Stress : Rather much   Housing Stability: Low Risk  (3/13/2025)    Housing Stability Vital Sign     Unable to Pay for Housing in the Last Year: No     Number of Times Moved in the Last Year: 0     Homeless in the Last Year: No

## 2025-03-15 LAB
ESTIMATED AVG GLUCOSE: 117 MG/DL (ref 68–131)
HBA1C MFR BLD: 5.7 % (ref 4–5.6)

## 2025-03-15 NOTE — PROGRESS NOTES
Family Medicine      Patient Name: Daniela Dillon  MRN: [id number]  : 1985    PCP: Raquel Shepard MD       HPI      Daniela Dillon is a 39 y.o. female with multiple medical diagnoses as listed in the medical history and problem list that presents for   Chief Complaint   Patient presents with    Urinary Tract Infection       HPI    History of Present Illness    Patient presents today for dysuria and urinary symptoms. She reports dysuria, specifically noting discomfort at the beginning and end of urination, along with hematuria. Due to severe pain, she has self-medicated with amoxicillin and cranberry juice. She has a history of urinary tract infection that progressed to kidney infection. 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 She reports fatigue since , sleeping 10+ hours without waking to urinate, which is unusual for her. She also notes recent pelvic pain. She has history of oral surgery in 2023. She reports history of yeast infections with antibiotic use, requiring treatment with boric acid and fluconazole. She was recently seen for elevated blood pressure with concerns for stroke risk. 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      ROS:  General: -fever, -chills, +fatigue, -weight gain, -weight loss  Eyes: -vision changes, -redness, -discharge  ENT: -ear pain, -nasal congestion, -sore throat  Cardiovascular: -chest pain, -palpitations, -lower extremity edema  Respiratory: -cough, -shortness of breath  Gastrointestinal: -abdominal pain, -nausea, -vomiting, -diarrhea, -constipation, -blood in stool  Genitourinary: +dysuria, +hematuria, -frequency, +pelvic pain  Musculoskeletal: -joint pain, -muscle pain  Skin: -rash, -lesion  Neurological: -headache, -dizziness, -numbness, -tingling  Psychiatric:  -anxiety, -depression, -sleep difficulty              History      Past Medical History:  Past Medical History:   Diagnosis Date    Asthma        Past Surgical History:  Past Surgical History:   Procedure Laterality Date    DILATION AND CURETTAGE OF UTERUS      2021 for polyp    LAPAROSCOPIC SALPINGECTOMY Bilateral 11/14/2023    Procedure: SALPINGECTOMY, LAPAROSCOPIC;  Surgeon: Yaa Slaughter MD;  Location: [facility] OR;  Service: OB/GYN;  Laterality: Bilateral;    THERMAL ABLATION OF ENDOMETRIUM USING HYSTEROSCOPY N/A 11/14/2023    Procedure: ABLATION, ENDOMETRIUM, THERMAL, HYSTEROSCOPIC;  Surgeon: Yaa Slaughter MD;  Location: [facility] OR;  Service: OB/GYN;  Laterality: N/A;  RN PREOP 10/25/2023 -- TYPE & SCREEN/---done----- HCG 11/11/23 --<1.2,  HOLOGIC EFRAÍN SHERMAN  [phone] NOTIFIED EFRAÍN ON 11/3/2023-LO       Social History:  Social History[1]    Family History:  Family History   Problem Relation Name Age of Onset    Hodgkin's lymphoma Mother      Stroke Father      Hypertension Father      Drug abuse Father      Alcohol abuse Father      Endocrine tumor Sister      Lung cancer Paternal Grandmother          smoker    Fibroids Daughter dani     No Known Problems Son bria        Allergies and Medications: (updated and reviewed)  Review of patient's allergies indicates:   Allergen Reactions    Grass pollen-june grass standard Hives     ITCHING, RUNNY NOSE    Mycobacterium tuberculosis (tuberculin ppd) Rash     Current Medications[2]    Patient Care Team:  Raquel Shepard MD as PCP - General (Internal Medicine)  Uzma Bowden MA as Care Coordinator         Exam      Review of Systems:  (as noted above)      Physical Exam:  Physical Exam  Vitals reviewed.   Constitutional:       General: She is not in acute distress.     Appearance: Normal appearance. She is normal weight. She is not ill-appearing.   HENT:      Head: Normocephalic and atraumatic.   Eyes:      Extraocular Movements: Extraocular  "movements intact.      Pupils: Pupils are equal, round, and reactive to light.   Cardiovascular:      Rate and Rhythm: Normal rate.      Pulses: Normal pulses.   Pulmonary:      Effort: Pulmonary effort is normal.   Abdominal:      General: Abdomen is flat.   Musculoskeletal:         General: Normal range of motion.      Cervical back: Normal range of motion.   Neurological:      Mental Status: She is alert and oriented to person, place, and time. Mental status is at baseline.   Psychiatric:         Mood and Affect: Affect normal. Mood is anxious.         Behavior: Behavior normal.       Vitals:    03/11/25 0837   BP: 118/82   BP Location: Left arm   Patient Position: Sitting   Pulse: 94   Resp: 20   Temp: 97.3 °F (36.3 °C)   TempSrc: Temporal   SpO2: 99%   Weight: 85.1 kg (187 lb 9.8 oz)   Height: 5' 5" (1.651 m)      Body mass index is 31.22 kg/m².             Assessment & Plan      1. UTI symptoms  - ciprofloxacin HCl (CIPRO) 250 MG tablet; Take 1 tablet (250 mg total) by mouth 2 (two) times daily. for 5 days  Dispense: 10 tablet; Refill: 0  - phenazopyridine (PYRIDIUM) 200 MG tablet; Take 1 tablet (200 mg total) by mouth 3 (three) times daily as needed for Pain.  Dispense: 15 tablet; Refill: 0    2. Vaginal irritation  - ketoconazole (NIZORAL) 2 % cream; Apply topically once daily.  Dispense: 30 g; Refill: 0       Assessment & Plan    IMPRESSION:  - Diagnosed UTI based on symptoms despite negative urine culture, likely due to recent antibiotic use  - Switched antibiotic from amoxicillin to ciprofloxacin for more targeted UTI treatment  - Prescribed pyridium for urinary pain relief  - Provided preventive antifungal medications due to patient's history of yeast infections with antibiotic use  - Ordered urine culture to confirm diagnosis and guide potential future treatment changes    URINARY TRACT INFECTION (UTI):  - Prescribed ciprofloxacin 500mg twice daily for 5 days to treat the UTI.  - Acknowledged the " patient's self-treatment with amoxicillin and cranberry juice.  - Recommend continuing cranberry juice intake.  - Prescribed pyridium for urinary pain relief, to be taken for 5 days.  - Informed the patient that pyridium will turn urine orange.  - Discussed the importance of frequent urination to prevent UTIs.  - Noted the patient's history of recurrent UTIs.  - Patient to increase frequency of urination, especially during work hours.  - Patient to continue drinking cranberry juice.    POTENTIAL YEAST INFECTION:  - Educated the patient about the potential for antibiotics to cause yeast infections.  - Prescribed ketoconazole cream for topical use if yeast infection symptoms occur.    HEMATURIA:  - Noted the presence of blood in the patient's urine.    HYPERTENSION:  - Monitored the patient's blood pressure, which was normal during today's visit.  - Noted the patient's history of hypertension.    PELVIC PAIN:  - Noted the patient's report of pelvic pain.    FATIGUE:  - Noted the patient's report of excessive sleeping and fatigue.    FOLLOW UP:  - Follow up on Friday as scheduled for previously scheduled appointment with Dr. Shepard.           --------------------------------------------      Health Maintenance:  Health Maintenance         Date Due Completion Date    Mammogram 06/07/2025 6/7/2024    Hemoglobin A1c (Diabetic Prevention Screening) 03/15/2027 3/15/2024    Cervical Cancer Screening 04/08/2027 4/8/2022    TETANUS VACCINE 04/03/2034 4/3/2024    RSV Vaccine (Age 60+ and Pregnant patients) (1 - 1-dose 75+ series) 05/12/2058 ---            Health maintenance reviewed    Follow Up:  No follow-ups on file.       - The patient is given an After Visit Summary that lists all medications with directions, allergies, education, orders placed during this encounter and follow-up instructions.      - I have reviewed the patient's medical information including past medical, family, and social history sections including the  medications and allergies.      - We discussed the patient's current medications.     This note was generated with the assistance of ambient listening technology. Verbal consent was obtained by the patient and accompanying visitor(s) for the recording of patient appointment to facilitate this note. I attest to having reviewed and edited the generated note for accuracy, though some syntax or spelling errors may persist. Please contact the author of this note for any clarification.      This note was created by combination of typed  and MModal dictation.  Transcription errors may be present.  If there are any questions, please contact me.     Karla Terry PA-C  [facility]             [1]   Social History  Socioeconomic History    Marital status:    Tobacco Use    Smoking status: Never    Smokeless tobacco: Never   Substance and Sexual Activity    Alcohol use: Yes     Comment: social    Drug use: Yes     Types: Marijuana    Sexual activity: Yes     Partners: Male     Birth control/protection: None   Social History Narrative     since 2009    She is teacher at [employer].     Social Drivers of Health     Financial Resource Strain: Low Risk  (3/13/2025)    Overall Financial Resource Strain (CARDIA)     Difficulty of Paying Living Expenses: Not very hard   Food Insecurity: No Food Insecurity (3/13/2025)    Hunger Vital Sign     Worried About Running Out of Food in the Last Year: Never true     Ran Out of Food in the Last Year: Never true   Transportation Needs: No Transportation Needs (3/13/2025)    PRAPARE - Transportation     Lack of Transportation (Medical): No     Lack of Transportation (Non-Medical): No   Physical Activity: Sufficiently Active (3/13/2025)    Exercise Vital Sign     Days of Exercise per Week: 4 days     Minutes of Exercise per Session: 60 min   Stress: Stress Concern Present (3/13/2025)    [employer] [employer] -  Occupational Stress Questionnaire     Feeling of Stress : Rather much   Housing Stability: Low Risk  (3/13/2025)    Housing Stability Vital Sign     Unable to Pay for Housing in the Last Year: No     Number of Times Moved in the Last Year: 0     Homeless in the Last Year: No   [2]   Current Outpatient Medications   Medication Sig Dispense Refill    ADVAIR -21 mcg/actuation HFAA inhaler Inhale 2 puffs into the lungs 2 (two) times daily. 12 g 3    albuterol (PROVENTIL/VENTOLIN HFA) 90 mcg/actuation inhaler Inhale 2 puffs into the lungs every 6 (six) hours as needed. 18 g 3    cetirizine (ZYRTEC) 10 MG tablet Take 10 mg by mouth once daily.      fluticasone propionate (FLONASE) 50 mcg/actuation nasal spray 1 spray by Nasal route.      montelukast (SINGULAIR) 10 mg tablet Take 10 mg by mouth every evening.      ruxolitinib 1.5 % Crea Apply to hypopigmented spots daily 60 g 5    ciprofloxacin HCl (CIPRO) 250 MG tablet Take 1 tablet (250 mg total) by mouth 2 (two) times daily. for 5 days 10 tablet 0    ketoconazole (NIZORAL) 2 % cream Apply topically once daily. 30 g 0    phenazopyridine (PYRIDIUM) 200 MG tablet Take 1 tablet (200 mg total) by mouth 3 (three) times daily as needed for Pain. 15 tablet 0     Current Facility-Administered Medications   Medication Dose Route Frequency Provider Last Rate Last Admin    triamcinolone acetonide injection 10 mg  10 mg Intradermal 1 time in Clinic/HOD

## 2025-03-17 ENCOUNTER — TELEPHONE (OUTPATIENT)
Dept: FAMILY MEDICINE | Facility: CLINIC | Age: 40
End: 2025-03-17
Payer: COMMERCIAL

## 2025-03-17 NOTE — TELEPHONE ENCOUNTER
Called and let pt know that her [facility] paperwork was ready and that one of the forms requiring a fax was sent over

## 2025-03-20 ENCOUNTER — OFFICE VISIT (OUTPATIENT)
Dept: BEHAVIORAL HEALTH | Facility: CLINIC | Age: 40
End: 2025-03-20
Payer: COMMERCIAL

## 2025-03-20 DIAGNOSIS — F43.23 ADJUSTMENT DISORDER WITH MIXED ANXIETY AND DEPRESSED MOOD: Primary | ICD-10-CM

## 2025-03-20 NOTE — PROGRESS NOTES
"Primary Care Behavioral Health Integration: Follow-up  Date:  3/20/2025  Patient Name: Daniela Dillon  Type of Visit:  Video Session  Site: [facility]  Referral Source:  Raquel Shepard MD    Visit type: Virtual visit with synchronous audio and video  The patient location is:  Home  The patient location Parish is: [facility]  The patient phone number is: [phone]    Each patient to whom he or she provides medical services by telemedicine is:  (1) informed of the relationship between the physician and patient and the respective role of any other health care provider with respect to management of the patient; and (2) notified that he or she may decline to receive medical services by telemedicine and may withdraw from such care at any time.    History of Present Illness:  Danieal Dillon, a 39 y.o.  female with history of Adjustment disorders; with mixed emotional features [F43.23] referred by Raquel Shepard MD.  Patient was seen, examined and chart was reviewed.    Met with patient.     Current session:   Pt discussed continued work stress. Pt reported that despite being on leave from work she is struggling with "letting go" and relinquishing control. 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 LCSW provided support and worked with pt to problem solve. Pt plans to utilize HR to help navigate current relationship with school administrators. Pt identified a need to create a more sustainable work/life balance. Pt reported she used to frequently work out and wants to start this again, as she noticed the positive impact it had on her mental health. Pt has also been working to eat more balanced meals. Pt set a goal to increase physical activity over the next week. LCSW and pt discussed how proactively taking care of physical and mental wellbeing can increase emotional regulation capabilities and help with stress reduction.         3/20/2025 "     8:54 AM 3/13/2025     1:58 PM   Results of the PHQ8   Little interest or pleasure in doing things Several days More than half the days   Feeling down, depressed, or hopeless More than half the days Several days   Trouble falling or staying asleep, or sleeping too much Several days Several days   Feeling tired or having little energy Several days Several days   Poor appetite or overeating Several days Several days   Feeling bad about yourself - or that you are a failure or have let yourself or your family down Not at all Not at all   Trouble concentrating on things, such as reading the newspaper or watching television Several days More than half the days   Moving or speaking so slowly that other people could have noticed. Or the opposite - being so fidgety or restless that you have been moving around a lot more than usual Not at all Not at all   Total Score  7 8           3/20/2025     8:53 AM 3/13/2025     1:57 PM 2/28/2025    12:19 PM   GAD7   1. Feeling nervous, anxious, or on edge? 1 1 3   2. Not being able to stop or control worrying? 1 1 3   3. Worrying too much about different things? 1 1 3   4. Trouble relaxing? 1 1 3   5. Being so restless that it is hard to sit still? 1 1 3   6. Becoming easily annoyed or irritable? 0 1 3   7. Feeling afraid as if something awful might happen? 1 1 3   8. If you checked off any problems, how difficult have these problems made it for you to do your work, take care of things at home, or get along with other people? 1 1 3   NEHAL-7 Score 6  7  21       Patient-reported       Mental Status Exam  General Appearance:  unremarkable, age appropriate     Speech: normal tone, normal rate, normal pitch, normal volume         Level of Cooperation: cooperative        Thought Processes: normal and logical      Mood: anxious        Thought Content: normal, no suicidality, no homicidality, delusions, or paranoia     Affect: congruent and appropriate    Orientation: Oriented x3     Memory:  recent >  intact, remote >  intact     Attention Span & Concentration: intact     Fund of General Knowledge: intact and appropriate to age and level of education   Abstract Reasoning: interpretation of similarities was abstract   Judgment & Insight: good       Language:  intact       Treatment plan:  Target symptoms: depression, anxiety , adjustment, work stress  Why chosen therapy is appropriate versus another modality: relevant to diagnosis, patient responds to this modality, evidence based practice  Outcome monitoring methods: self-report, checklist/rating scale  Therapeutic intervention type: insight oriented psychotherapy, behavior modifying psychotherapy, supportive psychotherapy    Risk parameters:  Patient reports no suicidal ideation  Patient reports no homicidal ideation  Patient reports no self-injurious behavior  Patient reports no violent behavior    Verbal deficits: None    Patient's response to intervention:  The patient's response to intervention is accepting.    Progress toward goals and other mental status changes:  The patient's progress toward goals is good.    Patient advised to call [phone] or present the the nearest ED if they experience suicidal or homicidal ideation, plan or intent.       Impression:  Pt presented to [facility] program with chief complaint of work stress. 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 Pt's interactions with school administrators led her to file a workplace grievance with . Current work stress has resulted in pt experiencing excessive worry, heightened anxiety, sleep difficulties, low mood, feelings of guilt, mild anhedonia, and difficulty concentrating     Diagnosis:   1. Adjustment disorder with mixed anxiety and depressed mood            Treatment Goals and Plan: Pt plans to continue individual therapy.    Future treatment will utilize CBT, Motivational Interviewing, and Solution-focused  Therapy    Return to Clinic: 2 weeks      Length of Appointment: 60 Non face-to-face clinical time: 15

## 2025-03-25 ENCOUNTER — PATIENT MESSAGE (OUTPATIENT)
Dept: FAMILY MEDICINE | Facility: CLINIC | Age: 40
End: 2025-03-25
Payer: COMMERCIAL

## 2025-04-01 ENCOUNTER — PATIENT MESSAGE (OUTPATIENT)
Dept: FAMILY MEDICINE | Facility: CLINIC | Age: 40
End: 2025-04-01
Payer: COMMERCIAL

## 2025-04-01 ENCOUNTER — PATIENT MESSAGE (OUTPATIENT)
Dept: BEHAVIORAL HEALTH | Facility: CLINIC | Age: 40
End: 2025-04-01
Payer: COMMERCIAL

## 2025-04-03 ENCOUNTER — OFFICE VISIT (OUTPATIENT)
Dept: BEHAVIORAL HEALTH | Facility: CLINIC | Age: 40
End: 2025-04-03
Payer: COMMERCIAL

## 2025-04-03 DIAGNOSIS — F43.23 ADJUSTMENT DISORDER WITH MIXED ANXIETY AND DEPRESSED MOOD: Primary | ICD-10-CM

## 2025-04-03 NOTE — PROGRESS NOTES
Primary Care Behavioral Health Integration: Follow-up  Date:  4/3/2025  Patient Name: Daniela Dillon  Type of Visit:  Video Session  Site: [facility]  Referral Source:  Raquel Shepard MD    Visit type: Virtual visit with synchronous audio and video  The patient location is:  Home  The patient location Parish is: [facility]  The patient phone number is: [phone]    Each patient to whom he or she provides medical services by telemedicine is:  (1) informed of the relationship between the physician and patient and the respective role of any other health care provider with respect to management of the patient; and (2) notified that he or she may decline to receive medical services by telemedicine and may withdraw from such care at any time.    History of Present Illness:  Daniela Dillon, a 39 y.o.  female with history of Adjustment disorders; with mixed emotional features [F43.23] referred by Raquel Shepard MD.  Patient was seen, examined and chart was reviewed.    Met with patient.     Current session:   Pt discussed continued stress and worry over work and her leave of absence. Pt is experiencing increased worry over her finances and is trying to plan for the future. Pt also discussed worry that her short-term disability will not be approved. Pt is unable to return to her job due to the significant elevation she experiences in her blood pressure. LCSW and pt discussed future plans and what would be most helpful to decrease stress. LCSW and pt discussed what has been helping with stress management and what else can be implemented. Pt has been working to take her dog on walks and clean up around her house, which she finds helpful. Pt was also able to work out twice since her last appointment. Pt used to practice mindfulness and believes she could begin incorporating this into her morning routine. Pt plans to begin setting aside some time each morning to practice deep breathing or utilize a guided  meditation video.           4/3/2025     9:35 AM 3/20/2025     8:54 AM 3/13/2025     1:58 PM   Results of the PHQ8   Little interest or pleasure in doing things Nearly every day Several days More than half the days   Feeling down, depressed, or hopeless More than half the days More than half the days Several days   Trouble falling or staying asleep, or sleeping too much Nearly every day Several days Several days   Feeling tired or having little energy Nearly every day Several days Several days   Poor appetite or overeating Nearly every day Several days Several days   Feeling bad about yourself - or that you are a failure or have let yourself or your family down Not at all Not at all Not at all   Trouble concentrating on things, such as reading the newspaper or watching television Nearly every day Several days More than half the days   Moving or speaking so slowly that other people could have noticed. Or the opposite - being so fidgety or restless that you have been moving around a lot more than usual Nearly every day Not at all Not at all   Total Score  20 7 8           4/3/2025     9:34 AM 3/20/2025     8:53 AM 3/13/2025     1:57 PM   GAD7   1. Feeling nervous, anxious, or on edge? 3 1 1   2. Not being able to stop or control worrying? 3 1 1   3. Worrying too much about different things? 3 1 1   4. Trouble relaxing? 1 1 1   5. Being so restless that it is hard to sit still? 3 1 1   6. Becoming easily annoyed or irritable? 2 0 1   7. Feeling afraid as if something awful might happen? 1 1 1   8. If you checked off any problems, how difficult have these problems made it for you to do your work, take care of things at home, or get along with other people? 2 1 1   NEHAL-7 Score 16  6  7        Patient-reported       Mental Status Exam  General Appearance:  unremarkable, age appropriate     Speech: normal tone, normal rate, normal pitch, normal volume         Level of Cooperation: cooperative        Thought Processes:  normal and logical      Mood: anxious        Thought Content: normal, no suicidality, no homicidality, delusions, or paranoia     Affect: congruent and appropriate    Orientation: Oriented x3     Memory: recent >  intact, remote >  intact     Attention Span & Concentration: intact     Fund of General Knowledge: intact and appropriate to age and level of education   Abstract Reasoning: interpretation of similarities was abstract   Judgment & Insight: good       Language:  intact       Treatment plan:  Target symptoms: depression, anxiety , adjustment, work stress  Why chosen therapy is appropriate versus another modality: relevant to diagnosis, patient responds to this modality, evidence based practice  Outcome monitoring methods: self-report, checklist/rating scale  Therapeutic intervention type: insight oriented psychotherapy, behavior modifying psychotherapy, supportive psychotherapy    Risk parameters:  Patient reports no suicidal ideation  Patient reports no homicidal ideation  Patient reports no self-injurious behavior  Patient reports no violent behavior    Verbal deficits: None    Patient's response to intervention:  The patient's response to intervention is accepting.    Progress toward goals and other mental status changes:  The patient's progress toward goals is good.    Patient advised to call [phone] or present the the nearest ED if they experience suicidal or homicidal ideation, plan or intent.       Impression:  Pt presented to [facility] program with chief complaint of work stress. 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 Pt's interactions with school administrators led her to file a workplace grievance with . Current work stress has resulted in pt experiencing excessive worry, heightened anxiety, sleep difficulties, low mood, feelings of guilt, mild anhedonia, and difficulty concentrating     Diagnosis:   1. Adjustment disorder with mixed  anxiety and depressed mood            Treatment Goals and Plan: Pt plans to continue individual therapy. Pt plans to continue utilizing coping skills.     Future treatment will utilize CBT, Mindfulness Techniques, Motivational Interviewing, and Solution-focused Therapy    Return to Clinic: 2 weeks    Length of Appointment: 60 Non face-to-face clinical time: 15

## 2025-04-04 ENCOUNTER — PATIENT MESSAGE (OUTPATIENT)
Dept: BEHAVIORAL HEALTH | Facility: CLINIC | Age: 40
End: 2025-04-04
Payer: COMMERCIAL

## 2025-04-07 ENCOUNTER — LAB VISIT (OUTPATIENT)
Dept: LAB | Facility: HOSPITAL | Age: 40
End: 2025-04-07
Attending: INTERNAL MEDICINE
Payer: COMMERCIAL

## 2025-04-07 DIAGNOSIS — Z00.00 ENCOUNTER FOR ANNUAL PHYSICAL EXAM: ICD-10-CM

## 2025-04-07 LAB
ABSOLUTE EOSINOPHIL (OHS): 0.33 K/UL
ABSOLUTE MONOCYTE (OHS): 0.46 K/UL (ref 0.3–1)
ABSOLUTE NEUTROPHIL COUNT (OHS): 2.58 K/UL (ref 1.8–7.7)
ALBUMIN SERPL BCP-MCNC: 3.9 G/DL (ref 3.5–5.2)
ALP SERPL-CCNC: 74 UNIT/L (ref 40–150)
ALT SERPL W/O P-5'-P-CCNC: 17 UNIT/L (ref 10–44)
ANION GAP (OHS): 8 MMOL/L (ref 8–16)
AST SERPL-CCNC: 21 UNIT/L (ref 11–45)
BASOPHILS # BLD AUTO: 0.04 K/UL
BASOPHILS NFR BLD AUTO: 0.7 %
BILIRUB SERPL-MCNC: 0.6 MG/DL (ref 0.1–1)
BUN SERPL-MCNC: 8 MG/DL (ref 6–20)
CALCIUM SERPL-MCNC: 9 MG/DL (ref 8.7–10.5)
CHLORIDE SERPL-SCNC: 107 MMOL/L (ref 95–110)
CHOLEST SERPL-MCNC: 169 MG/DL (ref 120–199)
CHOLEST/HDLC SERPL: 4.2 {RATIO} (ref 2–5)
CO2 SERPL-SCNC: 22 MMOL/L (ref 23–29)
CREAT SERPL-MCNC: 0.8 MG/DL (ref 0.5–1.4)
EAG (OHS): 114 MG/DL (ref 68–131)
ERYTHROCYTE [DISTWIDTH] IN BLOOD BY AUTOMATED COUNT: 12.7 % (ref 11.5–14.5)
GFR SERPLBLD CREATININE-BSD FMLA CKD-EPI: >60 ML/MIN/1.73/M2
GLUCOSE SERPL-MCNC: 97 MG/DL (ref 70–110)
HBA1C MFR BLD: 5.6 % (ref 4–5.6)
HCT VFR BLD AUTO: 38.7 % (ref 37–48.5)
HDLC SERPL-MCNC: 40 MG/DL (ref 40–75)
HDLC SERPL: 23.7 % (ref 20–50)
HGB BLD-MCNC: 12.2 GM/DL (ref 12–16)
IMM GRANULOCYTES # BLD AUTO: 0.01 K/UL (ref 0–0.04)
IMM GRANULOCYTES NFR BLD AUTO: 0.2 % (ref 0–0.5)
LDLC SERPL CALC-MCNC: 114.4 MG/DL (ref 63–159)
LYMPHOCYTES # BLD AUTO: 2.11 K/UL (ref 1–4.8)
MCH RBC QN AUTO: 28.2 PG (ref 27–31)
MCHC RBC AUTO-ENTMCNC: 31.5 G/DL (ref 32–36)
MCV RBC AUTO: 89 FL (ref 82–98)
NONHDLC SERPL-MCNC: 129 MG/DL
NUCLEATED RBC (/100WBC) (OHS): 0 /100 WBC
PLATELET # BLD AUTO: 194 K/UL (ref 150–450)
PMV BLD AUTO: 12.1 FL (ref 9.2–12.9)
POTASSIUM SERPL-SCNC: 4.4 MMOL/L (ref 3.5–5.1)
PROT SERPL-MCNC: 6.8 GM/DL (ref 6–8.4)
RBC # BLD AUTO: 4.33 M/UL (ref 4–5.4)
RELATIVE EOSINOPHIL (OHS): 6 %
RELATIVE LYMPHOCYTE (OHS): 38.2 % (ref 18–48)
RELATIVE MONOCYTE (OHS): 8.3 % (ref 4–15)
RELATIVE NEUTROPHIL (OHS): 46.6 % (ref 38–73)
SODIUM SERPL-SCNC: 137 MMOL/L (ref 136–145)
TRIGL SERPL-MCNC: 73 MG/DL (ref 30–150)
TSH SERPL-ACNC: 2.67 UIU/ML (ref 0.4–4)
WBC # BLD AUTO: 5.53 K/UL (ref 3.9–12.7)

## 2025-04-07 PROCEDURE — 85025 COMPLETE CBC W/AUTO DIFF WBC: CPT

## 2025-04-07 PROCEDURE — 82374 ASSAY BLOOD CARBON DIOXIDE: CPT

## 2025-04-07 PROCEDURE — 36415 COLL VENOUS BLD VENIPUNCTURE: CPT | Mod: PO

## 2025-04-07 PROCEDURE — 83036 HEMOGLOBIN GLYCOSYLATED A1C: CPT

## 2025-04-07 PROCEDURE — 80061 LIPID PANEL: CPT

## 2025-04-07 PROCEDURE — 84443 ASSAY THYROID STIM HORMONE: CPT

## 2025-04-10 ENCOUNTER — RESULTS FOLLOW-UP (OUTPATIENT)
Dept: OBSTETRICS AND GYNECOLOGY | Facility: CLINIC | Age: 40
End: 2025-04-10

## 2025-04-11 ENCOUNTER — PATIENT MESSAGE (OUTPATIENT)
Dept: FAMILY MEDICINE | Facility: CLINIC | Age: 40
End: 2025-04-11
Payer: COMMERCIAL

## 2025-04-11 ENCOUNTER — OFFICE VISIT (OUTPATIENT)
Dept: FAMILY MEDICINE | Facility: CLINIC | Age: 40
End: 2025-04-11
Payer: COMMERCIAL

## 2025-04-11 VITALS
SYSTOLIC BLOOD PRESSURE: 122 MMHG | HEIGHT: 65 IN | TEMPERATURE: 99 F | RESPIRATION RATE: 16 BRPM | OXYGEN SATURATION: 97 % | HEART RATE: 87 BPM | DIASTOLIC BLOOD PRESSURE: 78 MMHG | WEIGHT: 185.44 LBS | BODY MASS INDEX: 30.89 KG/M2

## 2025-04-11 DIAGNOSIS — N93.9 ABNORMAL UTERINE BLEEDING (AUB): ICD-10-CM

## 2025-04-11 DIAGNOSIS — R10.31 ABDOMINAL PAIN, RLQ: ICD-10-CM

## 2025-04-11 DIAGNOSIS — R09.81 NASAL CONGESTION: ICD-10-CM

## 2025-04-11 DIAGNOSIS — F43.23 ADJUSTMENT DISORDER WITH MIXED ANXIETY AND DEPRESSED MOOD: ICD-10-CM

## 2025-04-11 DIAGNOSIS — Z00.00 ANNUAL PHYSICAL EXAM: Primary | ICD-10-CM

## 2025-04-11 PROCEDURE — 99999 PR PBB SHADOW E&M-EST. PATIENT-LVL IV: CPT | Mod: PBBFAC,,, | Performed by: INTERNAL MEDICINE

## 2025-04-11 NOTE — ASSESSMENT & PLAN NOTE
- Observed signs of allergies during exam.  - Noted patient's current allergy medication regimen, including Zyrtec, Claritin, and steroids.  - Confirmed patient's use of nasal spray for allergy management.

## 2025-04-11 NOTE — ASSESSMENT & PLAN NOTE
- Noted patient's history of uterine issues and polyps.  - Coordinated abdominal ultrasound with previously scheduled transvaginal ultrasound if possible.  - Noted patient's report of a recent episode of heavy bleeding.  - Acknowledged that patient's OB office suggested a biopsy.  - Respected patient's preference to have an ultrasound before proceeding with a biopsy.  - Scheduled patient for a transvaginal ultrasound.

## 2025-04-11 NOTE — ASSESSMENT & PLAN NOTE
- Noted patient's report of feeling stressed and anxious about future work and school.  - Considered the possibility of a mini panic attack based on reported symptoms of lightheadedness and tachycardia during a stressful situation.  - Confirmed that patient is receiving therapy for anxiety management.

## 2025-04-11 NOTE — ASSESSMENT & PLAN NOTE
Physical exam completed, with results as mentioned above  Discussed HCM with patient  - patientup to date with available vaccines.   - annual labs results reviewed  - last pap smear on 3/19/2025   - Mammogram: Met on 6/7/2024, scheduled   - advised patient to follow up with ophthalmologist and dentist every year  - reviewed medical, surgical, family and social history    Completed annual paper work for school

## 2025-04-11 NOTE — ASSESSMENT & PLAN NOTE
- Evaluated the patient's abdominal pain, extending to the back, described as a hot knife or burning sensation.  - Performed physical exam of the abdomen, noting no stiffness.  - Auscultated normal bowel sounds.  - normal bowel movements and sounds.  - Ordered abdominal ultrasound to investigate persistent right-sided abdominal pain and determine the cause of the left lower quadrant pain.

## 2025-04-11 NOTE — PROGRESS NOTES
Chief Complaint: Annual Exam and Abdominal Pain (Had beef and been having stomach pain since also back pain)      Daniela Dillon  is a 39 y.o. year old patient who presents today for     GASTROINTESTINAL:  She reports abdominal pain that began after consuming spaghetti with beef two weeks ago, located in the lower abdomen radiating to the lower back. She describes the pain as burning, sometimes feeling like a hot knife, accompanied by excessive gas. Pain worsens after consuming heavy foods or meat, with a recent exacerbation after eating chicken. She has attempted symptom management with Gas-X, prune juice, and nightly heating pad application to her abdomen and back. She has a history of GI sensitivities, particularly to non-plant based oils, which trigger stomach irritation, diarrhea, constipation, and severe abdominal pain. She previously followed a paleo diet, then transitioned to vegan, and has recently reintroduced chicken with reported tolerance.    Past Medical History:   Diagnosis Date    Asthma        Past Surgical History:   Procedure Laterality Date    DILATION AND CURETTAGE OF UTERUS      2021 for polyp    LAPAROSCOPIC SALPINGECTOMY Bilateral 11/14/2023    Procedure: SALPINGECTOMY, LAPAROSCOPIC;  Surgeon: Yaa Slaughter MD;  Location: [facility] OR;  Service: OB/GYN;  Laterality: Bilateral;    THERMAL ABLATION OF ENDOMETRIUM USING HYSTEROSCOPY N/A 11/14/2023    Procedure: ABLATION, ENDOMETRIUM, THERMAL, HYSTEROSCOPIC;  Surgeon: Yaa Slaughter MD;  Location: [facility] OR;  Service: OB/GYN;  Laterality: N/A;  RN PREOP 10/25/2023 -- TYPE & SCREEN/---done----- HCG 11/11/23 --<1.2,  TANVI SHERMAN  [phone] NOTIFIED EFRAÍN ON 11/3/2023-LO        Family History   Problem Relation Name Age of Onset    Hodgkin's lymphoma Mother      Stroke Father      Hypertension Father      Drug abuse Father      Alcohol abuse Father      Endocrine tumor Sister      Lung cancer Paternal Grandmother           smoker    Fibroids Daughter dani     No Known Problems Son bria         Social History     Socioeconomic History    Marital status:    Tobacco Use    Smoking status: Never    Smokeless tobacco: Never   Substance and Sexual Activity    Alcohol use: Yes     Comment: social    Drug use: Yes     Types: Marijuana    Sexual activity: Yes     Partners: Male     Birth control/protection: None   Social History Narrative     since 2009    She is teacher at [employer].     Social Drivers of Health     Financial Resource Strain: Low Risk  (3/13/2025)    Overall Financial Resource Strain (CARDIA)     Difficulty of Paying Living Expenses: Not very hard   Food Insecurity: No Food Insecurity (3/13/2025)    Hunger Vital Sign     Worried About Running Out of Food in the Last Year: Never true     Ran Out of Food in the Last Year: Never true   Transportation Needs: No Transportation Needs (3/13/2025)    PRAPARE - Transportation     Lack of Transportation (Medical): No     Lack of Transportation (Non-Medical): No   Physical Activity: Sufficiently Active (3/13/2025)    Exercise Vital Sign     Days of Exercise per Week: 4 days     Minutes of Exercise per Session: 60 min   Stress: Stress Concern Present (3/13/2025)    [employer] - Occupational Stress Questionnaire     Feeling of Stress : Rather much   Housing Stability: Low Risk  (3/13/2025)    Housing Stability Vital Sign     Unable to Pay for Housing in the Last Year: No     Number of Times Moved in the Last Year: 0     Homeless in the Last Year: No       Current Medications[1]     Review of Systems   Constitutional:  Negative for chills and fever.   Gastrointestinal:  Positive for abdominal pain. Negative for blood in stool, constipation, diarrhea and melena.   Genitourinary:  Negative for dysuria.        Objective:      Vitals:    04/11/25 0727   BP: 122/78   Pulse: 87   Resp: 16   Temp: 98.6 °F (37 °C)       Physical Exam  Abdominal:        Musculoskeletal:        Back:           Assessment:       1. Annual physical exam    2. Abdominal pain, RLQ          Plan:   1.  Abdominal pain, RLQ  Assessment & Plan:  - Evaluated the patient's abdominal pain, extending to the back, described as a hot knife or burning sensation.  - Performed physical exam of the abdomen, noting no stiffness.  - Auscultated normal bowel sounds.  - normal bowel movements and sounds.  - Ordered abdominal ultrasound to investigate persistent right-sided abdominal pain and determine the cause of the left lower quadrant pain.    Orders:  -     US Abdomen Limited; Future; Expected date: 04/11/2025      IRRITABLE BOWEL SYNDROME:  - Noted patient's report of GI symptoms including diarrhea, constipation, and abdominal pain, particularly after consuming beef and certain types of oils.  - Considered the possibility of IBS but aim to rule out other conditions first.  - Ordered abdominal ultrasound to investigate the cause of symptoms.    Follow up in about 6 months (around 10/11/2025).                [1]   Current Outpatient Medications:     ADVAIR -21 mcg/actuation HFAA inhaler, Inhale 2 puffs into the lungs 2 (two) times daily., Disp: 12 g, Rfl: 3    albuterol (PROVENTIL/VENTOLIN HFA) 90 mcg/actuation inhaler, Inhale 2 puffs into the lungs every 6 (six) hours as needed., Disp: 18 g, Rfl: 3    cetirizine (ZYRTEC) 10 MG tablet, Take 10 mg by mouth once daily., Disp: , Rfl:     fluticasone propionate (FLONASE) 50 mcg/actuation nasal spray, 1 spray by Nasal route., Disp: , Rfl:     ketoconazole (NIZORAL) 2 % cream, Apply topically once daily., Disp: 30 g, Rfl: 0    montelukast (SINGULAIR) 10 mg tablet, Take 10 mg by mouth every evening., Disp: , Rfl:     ruxolitinib 1.5 % Crea, Apply to hypopigmented spots daily, Disp: 60 g, Rfl: 5    Current Facility-Administered Medications:     triamcinolone acetonide injection 10 mg, 10 mg, Intradermal, 1 time in Clinic/HOD,

## 2025-04-11 NOTE — PROGRESS NOTES
Chief Complaint: Annual Exam and Abdominal Pain (Had beef and been having stomach pain since also back pain)      Daniela Dillon  is a 39 y.o. year old patient who presents today for     History of Present Illness    CHIEF COMPLAINT:  Daniela presents today for follow-up on abdominal pain.    GYNECOLOGIC:  She reports recent vaginal bleeding described as heavy despite prior tubal ligation and endometrial ablation, resulting in visible staining. She has a history of uterine polyps and a transvaginal ultrasound is scheduled for the 17th for evaluation.    ANXIETY:  She experienced an episode of lightheadedness, faintness, and racing heart a few weeks ago, coinciding with her spaghetti consumption. She denies breathing difficulties, coughing, or wheezing.    MEDICATIONS:  She currently takes Zyrtec for allergies, rotating between Zyrtec and Claritin, and continues steroid medications. She has discontinued Singulair.      ROS:  General: -fever, -chills, -fatigue, -weight gain, -weight loss  Eyes: -vision changes, -redness, -discharge, +wear glasses or contacts  ENT: -ear pain, -nasal congestion, -sore throat  Cardiovascular: -chest pain, +palpitations, -lower extremity edema, +feelings of fast heart rate  Respiratory: -cough, -shortness of breath  Gastrointestinal: +abdominal pain, -nausea, -vomiting, -diarrhea, +constipation, -blood in stool, +back pain, +anal pain, +abdominal distention, +bloating  Genitourinary: -dysuria, -hematuria, -frequency, +excessive urination  Musculoskeletal: -joint pain, -muscle pain  Skin: -rash, -lesion  Neurological: -headache, -dizziness, -numbness, -tingling, +lightheadedness  Psychiatric: +anxiety, -depression, -sleep difficulty         Past Medical History:   Diagnosis Date    Asthma        Past Surgical History:   Procedure Laterality Date    DILATION AND CURETTAGE OF UTERUS      2021 for polyp    LAPAROSCOPIC SALPINGECTOMY Bilateral 11/14/2023    Procedure: SALPINGECTOMY,  LAPAROSCOPIC;  Surgeon: Yaa Slaughter MD;  Location: [facility] OR;  Service: OB/GYN;  Laterality: Bilateral;    THERMAL ABLATION OF ENDOMETRIUM USING HYSTEROSCOPY N/A 11/14/2023    Procedure: ABLATION, ENDOMETRIUM, THERMAL, HYSTEROSCOPIC;  Surgeon: Yaa Slaughter MD;  Location: [facility] OR;  Service: OB/GYN;  Laterality: N/A;  RN PREOP 10/25/2023 -- TYPE & SCREEN/---done----- HCG 11/11/23 --<1.2,  HOLOGIC EFRAÍN SHERMAN  [phone] NOTIFIED EFRAÍN ON 11/3/2023-LO        Family History   Problem Relation Name Age of Onset    Hodgkin's lymphoma Mother      Stroke Father      Hypertension Father      Drug abuse Father      Alcohol abuse Father      Endocrine tumor Sister      Lung cancer Paternal Grandmother          smoker    Fibroids Daughter dani     No Known Problems Son bria         Social History     Socioeconomic History    Marital status:    Tobacco Use    Smoking status: Never    Smokeless tobacco: Never   Substance and Sexual Activity    Alcohol use: Yes     Comment: social    Drug use: Yes     Types: Marijuana    Sexual activity: Yes     Partners: Male     Birth control/protection: None   Social History Narrative     since 2009    She is teacher at [employer].     Social Drivers of Health     Financial Resource Strain: Low Risk  (3/13/2025)    Overall Financial Resource Strain (CARDIA)     Difficulty of Paying Living Expenses: Not very hard   Food Insecurity: No Food Insecurity (3/13/2025)    Hunger Vital Sign     Worried About Running Out of Food in the Last Year: Never true     Ran Out of Food in the Last Year: Never true   Transportation Needs: No Transportation Needs (3/13/2025)    PRAPARE - Transportation     Lack of Transportation (Medical): No     Lack of Transportation (Non-Medical): No   Physical Activity: Sufficiently Active (3/13/2025)    Exercise Vital Sign     Days of Exercise per Week: 4 days     Minutes of Exercise per Session: 60 min   Stress: Stress Concern Present  (3/13/2025)    [employer] [place] [employer] - Occupational Stress Questionnaire     Feeling of Stress : Rather much   Housing Stability: Low Risk  (3/13/2025)    Housing Stability Vital Sign     Unable to Pay for Housing in the Last Year: No     Number of Times Moved in the Last Year: 0     Homeless in the Last Year: No       Current Medications[1]           Objective:      Vitals:    04/11/25 0727   BP: 122/78   Pulse: 87   Resp: 16   Temp: 98.6 °F (37 °C)       Physical Exam  Vitals and nursing note reviewed.   Constitutional:       Appearance: Normal appearance.   HENT:      Head: Normocephalic and atraumatic.      Right Ear: Ear canal and external ear normal. There is no impacted cerumen.      Left Ear: Ear canal and external ear normal. There is no impacted cerumen.      Ears:      Comments: Bilateral congestion behind TM     Nose: Congestion and rhinorrhea present.      Mouth/Throat:      Mouth: Mucous membranes are moist.      Pharynx: Oropharynx is clear. No oropharyngeal exudate or posterior oropharyngeal erythema.   Eyes:      Extraocular Movements: Extraocular movements intact.      Pupils: Pupils are equal, round, and reactive to light.   Cardiovascular:      Rate and Rhythm: Normal rate and regular rhythm.   Pulmonary:      Effort: Pulmonary effort is normal.      Breath sounds: Normal breath sounds. No wheezing or rales.   Abdominal:      Palpations: Abdomen is soft.      Tenderness: There is abdominal tenderness.   Musculoskeletal:         General: Normal range of motion.      Cervical back: Normal range of motion and neck supple. No tenderness.      Right lower leg: No edema.      Left lower leg: No edema.   Lymphadenopathy:      Cervical: No cervical adenopathy.   Skin:     General: Skin is warm and dry.   Neurological:      General: No focal deficit present.      Mental Status: She is alert and oriented to person, place, and time.      Cranial Nerves: No cranial nerve deficit.       Sensory: No sensory deficit.      Motor: No weakness.      Gait: Gait normal.   Psychiatric:         Mood and Affect: Mood normal.         Behavior: Behavior normal.          Assessment:       1. Annual physical exam    2. Abdominal pain, RLQ    3. Abnormal uterine bleeding (AUB)    4. Adjustment disorder with mixed anxiety and depressed mood    5. Nasal congestion          Plan:   1. Annual physical exam  Assessment & Plan:  Physical exam completed, with results as mentioned above  Discussed HCM with patient  - patientup to date with available vaccines.   - annual labs results reviewed  - last pap smear on 3/19/2025   - Mammogram: Met on 6/7/2024, scheduled   - advised patient to follow up with ophthalmologist and dentist every year  - reviewed medical, surgical, family and social history    Completed annual paper work for school        2. Abdominal pain, RLQ  Assessment & Plan:  - Evaluated the patient's abdominal pain, extending to the back, described as a hot knife or burning sensation.  - Performed physical exam of the abdomen, noting no stiffness.  - Auscultated normal bowel sounds.  - normal bowel movements and sounds.  - Ordered abdominal ultrasound to investigate persistent right-sided abdominal pain and determine the cause of the left lower quadrant pain.    Orders:  -     US Abdomen Limited; Future; Expected date: 04/11/2025    3. Abnormal uterine bleeding (AUB)  Assessment & Plan:  - Noted patient's history of uterine issues and polyps.  - Coordinated abdominal ultrasound with previously scheduled transvaginal ultrasound if possible.  - Noted patient's report of a recent episode of heavy bleeding.  - Acknowledged that patient's OB office suggested a biopsy.  - Respected patient's preference to have an ultrasound before proceeding with a biopsy.  - Scheduled patient for a transvaginal ultrasound.      4. Adjustment disorder with mixed anxiety and depressed mood  Assessment & Plan:  - Noted patient's  report of feeling stressed and anxious about future work and school.  - Considered the possibility of a mini panic attack based on reported symptoms of lightheadedness and tachycardia during a stressful situation.  - Confirmed that patient is receiving therapy for anxiety management.      5. Nasal congestion  Assessment & Plan:  - Observed signs of allergies during exam.  - Noted patient's current allergy medication regimen, including Zyrtec, Claritin, and steroids.  - Confirmed patient's use of nasal spray for allergy management.        OTHER MEDICAL HISTORY:  - Documented patient's history of tubal ligation.  - Noted patient's current use of systemic steroids for allergy management.    NEUROLOGICAL EXAMINATION:  - Performed basic neurological exam, which was normal.         Follow up in about 6 months (around 10/11/2025).    This note was generated with the assistance of ambient listening technology. Verbal consent was obtained by the patient and accompanying visitor(s) for the recording of patient appointment to facilitate this note. I attest to having reviewed and edited the generated note for accuracy, though some syntax or spelling errors may persist. Please contact the author of this note for any clarification.                  [1]   Current Outpatient Medications:     ADVAIR -21 mcg/actuation HFAA inhaler, Inhale 2 puffs into the lungs 2 (two) times daily., Disp: 12 g, Rfl: 3    albuterol (PROVENTIL/VENTOLIN HFA) 90 mcg/actuation inhaler, Inhale 2 puffs into the lungs every 6 (six) hours as needed., Disp: 18 g, Rfl: 3    cetirizine (ZYRTEC) 10 MG tablet, Take 10 mg by mouth once daily., Disp: , Rfl:     fluticasone propionate (FLONASE) 50 mcg/actuation nasal spray, 1 spray by Nasal route., Disp: , Rfl:     ketoconazole (NIZORAL) 2 % cream, Apply topically once daily., Disp: 30 g, Rfl: 0    montelukast (SINGULAIR) 10 mg tablet, Take 10 mg by mouth every evening., Disp: , Rfl:     ruxolitinib 1.5 %  Crea, Apply to hypopigmented spots daily, Disp: 60 g, Rfl: 5    Current Facility-Administered Medications:     triamcinolone acetonide injection 10 mg, 10 mg, Intradermal, 1 time in Clinic/HOD,

## 2025-04-17 ENCOUNTER — OFFICE VISIT (OUTPATIENT)
Dept: BEHAVIORAL HEALTH | Facility: CLINIC | Age: 40
End: 2025-04-17
Payer: COMMERCIAL

## 2025-04-17 ENCOUNTER — PATIENT MESSAGE (OUTPATIENT)
Dept: BEHAVIORAL HEALTH | Facility: CLINIC | Age: 40
End: 2025-04-17
Payer: COMMERCIAL

## 2025-04-17 ENCOUNTER — PATIENT MESSAGE (OUTPATIENT)
Dept: OBSTETRICS AND GYNECOLOGY | Facility: CLINIC | Age: 40
End: 2025-04-17
Payer: COMMERCIAL

## 2025-04-17 ENCOUNTER — HOSPITAL ENCOUNTER (OUTPATIENT)
Dept: RADIOLOGY | Facility: HOSPITAL | Age: 40
Discharge: HOME | End: 2025-04-17
Attending: PHYSICIAN ASSISTANT
Payer: COMMERCIAL

## 2025-04-17 DIAGNOSIS — N93.9 ABNORMAL UTERINE BLEEDING (AUB): ICD-10-CM

## 2025-04-17 DIAGNOSIS — F43.23 ADJUSTMENT DISORDER WITH MIXED ANXIETY AND DEPRESSED MOOD: Primary | ICD-10-CM

## 2025-04-17 PROCEDURE — 76856 US EXAM PELVIC COMPLETE: CPT | Mod: 26,,, | Performed by: RADIOLOGY

## 2025-04-17 PROCEDURE — 76830 TRANSVAGINAL US NON-OB: CPT | Mod: 26,,, | Performed by: RADIOLOGY

## 2025-04-17 PROCEDURE — 76856 US EXAM PELVIC COMPLETE: CPT | Mod: TC

## 2025-04-17 NOTE — PROGRESS NOTES
Primary Care Behavioral Health Integration: Follow-up  Date:  4/22/2025  Patient Name: Daniela Dillon  Type of Visit:  Video Session  Site: [facility]  Referral Source:  Raquel Shepard MD    Visit type: Virtual visit with synchronous audio and video  The patient location is:  Home  The patient location Parish is: [facility]  The patient phone number is: [phone]    Each patient to whom he or she provides medical services by telemedicine is:  (1) informed of the relationship between the physician and patient and the respective role of any other health care provider with respect to management of the patient; and (2) notified that he or she may decline to receive medical services by telemedicine and may withdraw from such care at any time.    History of Present Illness:  Daniela Dillon, a 39 y.o.  female with history of Adjustment disorders; with mixed emotional features [F43.23] referred by Raquel Shepard MD.  Patient was seen, examined and chart was reviewed.    Met with patient.     Current session:   Pt reported some improvement in mood and a reduction in anxiety levels, which is reflected in pt's PHQ-9 and NEHAL-7 scores. Pt reported she has had more motivation to clean her house and exercise. Pt reported she is focusing more on what she has control over and is coping better with the stress of leaving her job. Pt reported she feels more separation from her job now, which has been helpful for pt. Pt is currently interviewing for a couple of job opportunities to help reduce financial stress. Pt reported school has been going well. LCSW and pt reviewed coping skills. LCSW utilized SFBT to amplify pt's strengths, progress, and resources.           4/17/2025    10:02 AM 4/3/2025     9:35 AM 3/20/2025     8:54 AM 3/13/2025     1:58 PM   Results of the PHQ8   Little interest or pleasure in doing things Not at all Nearly every day Several days More than half the days   Feeling down, depressed, or hopeless  Several days More than half the days More than half the days Several days   Trouble falling or staying asleep, or sleeping too much Several days Nearly every day Several days Several days   Feeling tired or having little energy Several days Nearly every day Several days Several days   Poor appetite or overeating Several days Nearly every day Several days Several days   Feeling bad about yourself - or that you are a failure or have let yourself or your family down Not at all Not at all Not at all Not at all   Trouble concentrating on things, such as reading the newspaper or watching television Several days Nearly every day Several days More than half the days   Moving or speaking so slowly that other people could have noticed. Or the opposite - being so fidgety or restless that you have been moving around a lot more than usual Not at all Nearly every day Not at all Not at all   Total Score  5 20 7 8           4/17/2025    10:01 AM 4/3/2025     9:34 AM 3/20/2025     8:53 AM   GAD7   1. Feeling nervous, anxious, or on edge? 1 3 1   2. Not being able to stop or control worrying? 1 3 1   3. Worrying too much about different things? 1 3 1   4. Trouble relaxing? 1 1 1   5. Being so restless that it is hard to sit still? 1 3 1   6. Becoming easily annoyed or irritable? 0 2 0   7. Feeling afraid as if something awful might happen? 2 1 1   8. If you checked off any problems, how difficult have these problems made it for you to do your work, take care of things at home, or get along with other people? 1 2 1   NEHAL-7 Score 7  16  6        Patient-reported       Mental Status Exam  General Appearance:  unremarkable, age appropriate     Speech: normal tone, normal rate, normal pitch, normal volume         Level of Cooperation: cooperative        Thought Processes: normal and logical      Mood: anxious        Thought Content: normal, no suicidality, no homicidality, delusions, or paranoia     Affect: congruent and appropriate     Orientation: Oriented x3     Memory: recent >  intact, remote >  intact     Attention Span & Concentration: intact     Fund of General Knowledge: intact and appropriate to age and level of education   Abstract Reasoning: interpretation of similarities was abstract   Judgment & Insight: good       Language:  intact       Treatment plan:  Target symptoms: depression, anxiety , adjustment, work stress  Why chosen therapy is appropriate versus another modality: relevant to diagnosis, patient responds to this modality, evidence based practice  Outcome monitoring methods: self-report, checklist/rating scale  Therapeutic intervention type: insight oriented psychotherapy, behavior modifying psychotherapy, supportive psychotherapy    Risk parameters:  Patient reports no suicidal ideation  Patient reports no homicidal ideation  Patient reports no self-injurious behavior  Patient reports no violent behavior    Verbal deficits: None    Patient's response to intervention:  The patient's response to intervention is accepting.    Progress toward goals and other mental status changes:  The patient's progress toward goals is good.    Patient advised to call [phone] or present the the nearest ED if they experience suicidal or homicidal ideation, plan or intent.       Impression:  Pt presented to [facility] program with chief complaint of work stress. 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 Pt's interactions with school administrators led her to file a workplace grievance with . Current work stress has resulted in pt experiencing excessive worry, heightened anxiety, sleep difficulties, low mood, feelings of guilt, mild anhedonia, and difficulty concentrating     Diagnosis:   1. Adjustment disorder with mixed anxiety and depressed mood            Treatment Goals and Plan: Pt plans to continue individual therapy. Pt plans to continue utilizing coping skills.     Future  treatment will utilize CBT, Mindfulness Techniques, Motivational Interviewing, and Solution-focused Therapy    Return to Clinic: 2 weeks    Length of Appointment: 60 Non face-to-face clinical time: 15

## 2025-04-28 ENCOUNTER — HOSPITAL ENCOUNTER (OUTPATIENT)
Dept: RADIOLOGY | Facility: HOSPITAL | Age: 40
Discharge: HOME | End: 2025-04-28
Attending: INTERNAL MEDICINE
Payer: COMMERCIAL

## 2025-04-28 ENCOUNTER — RESULTS FOLLOW-UP (OUTPATIENT)
Dept: FAMILY MEDICINE | Facility: CLINIC | Age: 40
End: 2025-04-28
Payer: COMMERCIAL

## 2025-04-28 DIAGNOSIS — R10.31 ABDOMINAL PAIN, RLQ: ICD-10-CM

## 2025-04-28 DIAGNOSIS — K82.4 GALLBLADDER POLYP: Primary | ICD-10-CM

## 2025-04-28 PROCEDURE — 76705 ECHO EXAM OF ABDOMEN: CPT | Mod: TC

## 2025-04-28 PROCEDURE — 76705 ECHO EXAM OF ABDOMEN: CPT | Mod: 26,,, | Performed by: INTERNAL MEDICINE

## 2025-04-29 ENCOUNTER — OFFICE VISIT (OUTPATIENT)
Dept: BEHAVIORAL HEALTH | Facility: CLINIC | Age: 40
End: 2025-04-29
Payer: COMMERCIAL

## 2025-04-29 ENCOUNTER — OFFICE VISIT (OUTPATIENT)
Dept: OBSTETRICS AND GYNECOLOGY | Facility: CLINIC | Age: 40
End: 2025-04-29
Payer: COMMERCIAL

## 2025-04-29 ENCOUNTER — PATIENT MESSAGE (OUTPATIENT)
Dept: BEHAVIORAL HEALTH | Facility: CLINIC | Age: 40
End: 2025-04-29
Payer: COMMERCIAL

## 2025-04-29 DIAGNOSIS — N93.9 ABNORMAL UTERINE BLEEDING (AUB): Primary | ICD-10-CM

## 2025-04-29 DIAGNOSIS — F43.23 ADJUSTMENT DISORDER WITH MIXED ANXIETY AND DEPRESSED MOOD: Primary | ICD-10-CM

## 2025-04-29 PROCEDURE — 90837 PSYTX W PT 60 MINUTES: CPT | Mod: 95,,,

## 2025-04-29 PROCEDURE — 3044F HG A1C LEVEL LT 7.0%: CPT | Mod: CPTII,95,,

## 2025-04-29 NOTE — PROGRESS NOTES
The patient location is: result  The chief complaint leading to consultation is: result    Visit type: audiovisual    Face to Face time with patient: 15  25 minutes of total time spent on the encounter, which includes face to face time and non-face to face time preparing to see the patient (eg, review of tests), Obtaining and/or reviewing separately obtained history, Documenting clinical information in the electronic or other health record, Independently interpreting results (not separately reported) and communicating results to the patient/family/caregiver, or Care coordination (not separately reported).         Each patient to whom he or she provides medical services by telemedicine is:  (1) informed of the relationship between the physician and patient and the respective role of any other health care provider with respect to management of the patient; and (2) notified that he or she may decline to receive medical services by telemedicine and may withdraw from such care at any time.    Notes:     SUBJECTIVE:   39 y.o. female   for result    No LMP recorded. Patient has had an ablation..       SHE IS S/P novarsure endometrial ablation and lap BS on 23 and has been amenorrheic  Was evaluated by Crystal Brambila  Then started having bleeding on 3/14/25, thought it was UTI but then  Noted unexpected vaginal bleeding when she was just sitting to do a zoom meeting.   Bleeding was brisk and came out quickly Lasting for 1.5 days. Bleeding described as light pink with water.  Not heavy like her normal period.   Has not had bleeding since  Were having UTI symptoms at the time and was on abx.   Was evaluated by Crystal Brambila for this  Pelvic US done and as below, wants to see if she needs to have EMB done     EXAMINATION:  US PELVIS COMP WITH TRANSVAG NON-OB (XPD)     CLINICAL HISTORY:  Abnormal uterine and vaginal bleeding, unspecified     TECHNIQUE:  Transabdominal sonography of the pelvis was performed,  followed by transvaginal sonography to better evaluate the uterus and ovaries.     COMPARISON:  Ultrasound from 10/31/2023     FINDINGS:  Uterus:     Size: 7.3 x 3.6 x 4.7 cm     Masses: None     Endometrium: Normal thickness in this pre menopausal patient, measuring 2 mm.  Fluid within the endometrial canal.     Right ovary:     Size: 2.7 x 1.5 x 3.1 cm     Appearance: Normal     Vascular flow: Normal.     Left ovary:     Size: 2.9 x 1.8 x 3.2 cm     Appearance: Simple appearing follicle measuring 1.8 x 2.4 x 1.6 cm.     Vascular Flow: Normal.     Free Fluid:     Small amount of free fluid in the cul-de-sac and adjacent to the left ovary.     Impression:     Fluid within the endometrial canal, likely hemorrhagic products.        Electronically signed by:Cory Rocha MD  Date:                                            2025  Time:                                           15:52        OB History    Para Term  AB Living   2 2 2 0 0 2   SAB IAB Ectopic Multiple Live Births       2      # Outcome Date GA Lbr Simon/2nd Weight Sex Type Anes PTL Lv   2 Term            1 Term               Obstetric Comments   Gynhx: reg/7-10 days (heavy)    endometriosis   H/o hysteroscopy D&C ( ? Polyp was found) in    Denies abnl pap   Denies std     Past Medical History:   Diagnosis Date    Asthma      Past Surgical History:   Procedure Laterality Date    DILATION AND CURETTAGE OF UTERUS       for polyp    LAPAROSCOPIC SALPINGECTOMY Bilateral 2023    Procedure: SALPINGECTOMY, LAPAROSCOPIC;  Surgeon: Yaa Slaughter MD;  Location: [facility] OR;  Service: OB/GYN;  Laterality: Bilateral;    THERMAL ABLATION OF ENDOMETRIUM USING HYSTEROSCOPY N/A 2023    Procedure: ABLATION, ENDOMETRIUM, THERMAL, HYSTEROSCOPIC;  Surgeon: Yaa Slaughter MD;  Location: [facility] OR;  Service: OB/GYN;  Laterality: N/A;  RN PREOP 10/25/2023 -- TYPE & SCREEN/---done----- HCG 23 --<1.2,  TANVI SHERMAN   [phone] NOTIFIED EFRAÍN ON 11/3/2023-     Social History[1]  Family History   Problem Relation Name Age of Onset    Hodgkin's lymphoma Mother      Stroke Father      Hypertension Father      Drug abuse Father      Alcohol abuse Father      Endocrine tumor Sister      Lung cancer Paternal Grandmother          smoker    Fibroids Daughter dani     No Known Problems Son bria          Current Medications[2]  Allergies: Grass pollen-june grass standard and Mycobacterium tuberculosis (tuberculin ppd)       ROS  ROS:  GENERAL: Denies weight gain or weight loss. Feeling well overall.   SKIN: Denies rash or lesions.   HEAD: Denies head injury or headache.   NODES: Denies enlarged lymph nodes.   CHEST: Denies chest pain or shortness of breath.   CARDIOVASCULAR: Denies palpitations or left sided chest pain.   ABDOMEN: No abdominal pain, constipation, diarrhea, nausea, vomiting or rectal bleeding.   URINARY: No frequency, dysuria, hematuria, or burning on urination.  REPRODUCTIVE: see HPI  BREASTS: The patient performs breast self-examination and denies pain, lumps, or nipple discharge.   HEMATOLOGIC: No easy bruisability or excessive bleeding.  MUSCULOSKELETAL: Denies joint pain or swelling.   NEUROLOGIC: Denies syncope or weakness.   PSYCHIATRIC: Denies depression, anxiety or mood swings.      OBJECTIVE:   The patient appears well, alert, oriented x 3, in no distress.    Counseling appt      ASSESSMENT:   A/p:   AUB:  US with thin ES which is expected with h/o endometrial ablation.  No EMB indicated at this time.  Advised pt to monitor bleeding pattern, if cyclic then likely menses resume.  If bleeding to be irregular - advised to f/u with us for possible EMB         [1]   Social History  Socioeconomic History    Marital status:    Tobacco Use    Smoking status: Never    Smokeless tobacco: Never   Substance and Sexual Activity    Alcohol use: Yes     Comment: social    Drug use: Yes     Types: Marijuana     Sexual activity: Yes     Partners: Male     Birth control/protection: None   Social History Narrative     since 2009    She is teacher at [employer]     Social Drivers of Health     Financial Resource Strain: Low Risk  (3/13/2025)    Overall Financial Resource Strain (CARDIA)     Difficulty of Paying Living Expenses: Not very hard   Food Insecurity: No Food Insecurity (3/13/2025)    Hunger Vital Sign     Worried About Running Out of Food in the Last Year: Never true     Ran Out of Food in the Last Year: Never true   Transportation Needs: No Transportation Needs (3/13/2025)    PRAPARE - Transportation     Lack of Transportation (Medical): No     Lack of Transportation (Non-Medical): No   Physical Activity: Sufficiently Active (3/13/2025)    Exercise Vital Sign     Days of Exercise per Week: 4 days     Minutes of Exercise per Session: 60 min   Stress: Stress Concern Present (3/13/2025)    [employer] - Occupational Stress Questionnaire     Feeling of Stress : Rather much   Housing Stability: Low Risk  (3/13/2025)    Housing Stability Vital Sign     Unable to Pay for Housing in the Last Year: No     Number of Times Moved in the Last Year: 0     Homeless in the Last Year: No   [2]   Current Outpatient Medications   Medication Sig Dispense Refill    ADVAIR -21 mcg/actuation HFAA inhaler Inhale 2 puffs into the lungs 2 (two) times daily. 12 g 3    albuterol (PROVENTIL/VENTOLIN HFA) 90 mcg/actuation inhaler Inhale 2 puffs into the lungs every 6 (six) hours as needed. 18 g 3    cetirizine (ZYRTEC) 10 MG tablet Take 10 mg by mouth once daily.      fluticasone propionate (FLONASE) 50 mcg/actuation nasal spray 1 spray by Nasal route.      ketoconazole (NIZORAL) 2 % cream Apply topically once daily. 30 g 0    montelukast (SINGULAIR) 10 mg tablet Take 10 mg by mouth every evening.      ruxolitinib 1.5 % Crea Apply to hypopigmented spots daily 60 g 5     Current  Facility-Administered Medications   Medication Dose Route Frequency Provider Last Rate Last Admin    triamcinolone acetonide injection 10 mg  10 mg Intradermal 1 time in Clinic/HOD

## 2025-04-29 NOTE — PROGRESS NOTES
"Primary Care Behavioral Health Integration: Follow-up  Date:  4/29/2025  Patient Name: Daniela Dillon  Type of Visit:  Video Session  Site: [facility]  Referral Source:  Raquel Shepard MD    Visit type: Virtual visit with synchronous audio and video  The patient location is:  Home  The patient location Parish is: [facility]  The patient phone number is: [phone]    Each patient to whom he or she provides medical services by telemedicine is:  (1) informed of the relationship between the physician and patient and the respective role of any other health care provider with respect to management of the patient; and (2) notified that he or she may decline to receive medical services by telemedicine and may withdraw from such care at any time.    History of Present Illness:  Daniela Dillon, a 39 y.o.  female with history of Adjustment disorders; with mixed emotional features [F43.23] referred by Raquel Shepard MD.  Patient was seen, examined and chart was reviewed.    Met with patient.     Current session:   Pt's stress levels have impacted her physical health in multiple ways and pt expressed a desire to prioritize her physical health at this time. Pt discussed progress in reducing work-related stress. Pt identified what has helped her achieve this, including separation from her former job, "letting things go" when needed, and focusing on her hopes for the future. Pt reported she is currently a 6 or 7 out of 10 in how well she is managing stress and anxiety (pt was a 3 out of 10 last month). Pt's leave of absence from work appears to be a significant factor in reducing stress levels. LCSW and pt reviewed additional coping skills. LCSW utilized SFBT to amplify pt's strengths, progress, and resources. Pt has two more sessions through [facility] program. Pt would like to find a long-term therapist following [facility]. [facility] within [facility] insurance network. "           4/29/2025     9:58 AM 4/17/2025    10:02 AM 4/3/2025     9:35 AM 3/20/2025     8:54 AM 3/13/2025     1:58 PM   Results of the PHQ8   Little interest or pleasure in doing things Several days Not at all Nearly every day Several days More than half the days   Feeling down, depressed, or hopeless Several days Several days More than half the days More than half the days Several days   Trouble falling or staying asleep, or sleeping too much Several days Several days Nearly every day Several days Several days   Feeling tired or having little energy Several days Several days Nearly every day Several days Several days   Poor appetite or overeating Several days Several days Nearly every day Several days Several days   Feeling bad about yourself - or that you are a failure or have let yourself or your family down Not at all Not at all Not at all Not at all Not at all   Trouble concentrating on things, such as reading the newspaper or watching television Several days Several days Nearly every day Several days More than half the days   Moving or speaking so slowly that other people could have noticed. Or the opposite - being so fidgety or restless that you have been moving around a lot more than usual Not at all Not at all Nearly every day Not at all Not at all   Total Score  6 5 20 7 8           4/29/2025     9:57 AM 4/17/2025    10:01 AM 4/3/2025     9:34 AM   GAD7   1. Feeling nervous, anxious, or on edge? 1 1 3   2. Not being able to stop or control worrying? 1 1 3   3. Worrying too much about different things? 1 1 3   4. Trouble relaxing? 1 1 1   5. Being so restless that it is hard to sit still? 1 1 3   6. Becoming easily annoyed or irritable? 0 0 2   7. Feeling afraid as if something awful might happen? 1 2 1   8. If you checked off any problems, how difficult have these problems made it for you to do your work, take care of things at home, or get along with other people? 1 1 2   NEHAL-7 Score 6  7  16         Patient-reported       Mental Status Exam  General Appearance:  unremarkable, age appropriate     Speech: normal tone, normal rate, normal pitch, normal volume         Level of Cooperation: cooperative        Thought Processes: normal and logical      Mood: anxious        Thought Content: normal, no suicidality, no homicidality, delusions, or paranoia     Affect: congruent and appropriate    Orientation: Oriented x3     Memory: recent >  intact, remote >  intact     Attention Span & Concentration: intact     Fund of General Knowledge: intact and appropriate to age and level of education   Abstract Reasoning: interpretation of similarities was abstract   Judgment & Insight: good       Language:  intact       Treatment plan:  Target symptoms: depression, anxiety , adjustment, work stress  Why chosen therapy is appropriate versus another modality: relevant to diagnosis, patient responds to this modality, evidence based practice  Outcome monitoring methods: self-report, checklist/rating scale  Therapeutic intervention type: insight oriented psychotherapy, behavior modifying psychotherapy, supportive psychotherapy    Risk parameters:  Patient reports no suicidal ideation  Patient reports no homicidal ideation  Patient reports no self-injurious behavior  Patient reports no violent behavior    Verbal deficits: None    Patient's response to intervention:  The patient's response to intervention is accepting.    Progress toward goals and other mental status changes:  The patient's progress toward goals is good.    Patient advised to call [phone] or present the the nearest ED if they experience suicidal or homicidal ideation, plan or intent.       Impression:  Pt presented to [facility] program with chief complaint of work stress. 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 Pt's interactions with school administrators led her to file a workplace grievance with HR.  Current work stress has resulted in pt experiencing excessive worry, heightened anxiety, sleep difficulties, low mood, feelings of guilt, mild anhedonia, and difficulty concentrating     Diagnosis:   1. Adjustment disorder with mixed anxiety and depressed mood            Treatment Goals and Plan: Pt plans to continue individual therapy. Pt plans to continue utilizing coping skills.     Future treatment will utilize CBT, Mindfulness Techniques, Motivational Interviewing, and Solution-focused Therapy    Return to Clinic: 2 weeks    Length of Appointment: 60 Non face-to-face clinical time: 15

## 2025-05-15 ENCOUNTER — PATIENT MESSAGE (OUTPATIENT)
Dept: BEHAVIORAL HEALTH | Facility: CLINIC | Age: 40
End: 2025-05-15
Payer: COMMERCIAL

## 2025-05-15 ENCOUNTER — OFFICE VISIT (OUTPATIENT)
Dept: BEHAVIORAL HEALTH | Facility: CLINIC | Age: 40
End: 2025-05-15
Payer: COMMERCIAL

## 2025-05-15 DIAGNOSIS — F41.1 GENERALIZED ANXIETY DISORDER: Primary | ICD-10-CM

## 2025-05-15 NOTE — PROGRESS NOTES
"Primary Care Behavioral Health Integration: Follow-up  Date:  5/15/2025  Patient Name: Daniela Dillon  Type of Visit:  Video Session  Site: [facility]  Referral Source:  Raquel Shepard MD    Visit type: Virtual visit with synchronous audio and video  The patient location is:  Home  The patient location Parish is: [facility]  The patient phone number is: [phone]    Each patient to whom he or she provides medical services by telemedicine is:  (1) informed of the relationship between the physician and patient and the respective role of any other health care provider with respect to management of the patient; and (2) notified that he or she may decline to receive medical services by telemedicine and may withdraw from such care at any time.    History of Present Illness:  Daniela Dillon, a 40 y.o.  female with history of Adjustment disorders; with mixed emotional features [F43.23] referred by Raquel Shepard MD.  Patient was seen, examined and chart was reviewed.    Met with patient.     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 Pt also reported stress about future career goals. Pt has many career goals, both in regards to becoming a nurse and in opening other business ventures. Pt reported she "puts too much pressure" on herself. Pt spends a significantly amount of time thinking about career goals and how to accomplish them, and often worries about the future. LCSW discussed helpful vs unhelpful worry with pt. LCSW discussed with pt the benefit of dedicating time to being in the present moment and reviewed mindfulness skills that pt can utilize to help her practice this. LCSW utilized SFBT to amplify pt's strengths, progress, and resources. Pt has one more session through [facility] program.           5/15/2025     7:57 AM 4/29/2025     9:58 AM 4/17/2025    10:02 AM 4/3/2025     9:35 AM 3/20/2025     8:54 AM 3/13/2025     1:58 PM "   Results of the PHQ8   Little interest or pleasure in doing things Not at all Several days Not at all Nearly every day Several days More than half the days   Feeling down, depressed, or hopeless Not at all Several days Several days More than half the days More than half the days Several days   Trouble falling or staying asleep, or sleeping too much More than half the days Several days Several days Nearly every day Several days Several days   Feeling tired or having little energy More than half the days Several days Several days Nearly every day Several days Several days   Poor appetite or overeating Nearly every day Several days Several days Nearly every day Several days Several days   Feeling bad about yourself - or that you are a failure or have let yourself or your family down Not at all Not at all Not at all Not at all Not at all Not at all   Trouble concentrating on things, such as reading the newspaper or watching television Several days Several days Several days Nearly every day Several days More than half the days   Moving or speaking so slowly that other people could have noticed. Or the opposite - being so fidgety or restless that you have been moving around a lot more than usual Not at all Not at all Not at all Nearly every day Not at all Not at all   Total Score  8 6 5 20 7 8           5/15/2025     7:56 AM 4/29/2025     9:57 AM 4/17/2025    10:01 AM   GAD7   1. Feeling nervous, anxious, or on edge? 2 1 1   2. Not being able to stop or control worrying? 2 1 1   3. Worrying too much about different things? 2 1 1   4. Trouble relaxing? 2 1 1   5. Being so restless that it is hard to sit still? 0 1 1   6. Becoming easily annoyed or irritable? 0 0 0   7. Feeling afraid as if something awful might happen? 1 1 2   8. If you checked off any problems, how difficult have these problems made it for you to do your work, take care of things at home, or get along with other people? 1 1 1   NEHAL-7 Score 9  6  7         Patient-reported       Mental Status Exam  General Appearance:  unremarkable, age appropriate     Speech: normal tone, normal rate, normal pitch, normal volume         Level of Cooperation: cooperative        Thought Processes: normal and logical      Mood: anxious        Thought Content: normal, no suicidality, no homicidality, delusions, or paranoia     Affect: congruent and appropriate    Orientation: Oriented x3     Memory: recent >  intact, remote >  intact     Attention Span & Concentration: intact     Fund of General Knowledge: intact and appropriate to age and level of education   Abstract Reasoning: interpretation of similarities was abstract   Judgment & Insight: good       Language:  intact       Treatment plan:  Target symptoms: depression, anxiety , adjustment, work stress  Why chosen therapy is appropriate versus another modality: relevant to diagnosis, patient responds to this modality, evidence based practice  Outcome monitoring methods: self-report, checklist/rating scale  Therapeutic intervention type: insight oriented psychotherapy, behavior modifying psychotherapy, supportive psychotherapy    Risk parameters:  Patient reports no suicidal ideation  Patient reports no homicidal ideation  Patient reports no self-injurious behavior  Patient reports no violent behavior    Verbal deficits: None    Patient's response to intervention:  The patient's response to intervention is accepting.    Progress toward goals and other mental status changes:  The patient's progress toward goals is good.    Patient advised to call [phone] or present the the nearest ED if they experience suicidal or homicidal ideation, plan or intent.       Impression:  Pt presented to [facility] program with chief complaint of work stress. 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 Pt's interactions with school administrators led her to file a workplace grievance with HR.  Current work stress has resulted in pt experiencing excessive worry, heightened anxiety, sleep difficulties, low mood, feelings of guilt, mild anhedonia, and difficulty concentrating. Pt has also reported persistent, generalized worry.    Diagnosis:   1. Generalized anxiety disorder            Treatment Goals and Plan: Pt plans to continue individual therapy. Pt plans to practice mindfulness and utilize coping skills.     Future treatment will utilize CBT, Mindfulness Techniques, Motivational Interviewing, and Solution-focused Therapy    Return to Clinic: 2 weeks    Length of Appointment: 60 Non face-to-face clinical time: 15

## 2025-05-22 ENCOUNTER — PATIENT MESSAGE (OUTPATIENT)
Dept: FAMILY MEDICINE | Facility: CLINIC | Age: 40
End: 2025-05-22
Payer: COMMERCIAL

## 2025-05-22 DIAGNOSIS — R10.31 ABDOMINAL PAIN, RLQ: Primary | ICD-10-CM

## 2025-05-29 ENCOUNTER — LAB VISIT (OUTPATIENT)
Dept: LAB | Facility: HOSPITAL | Age: 40
End: 2025-05-29
Payer: COMMERCIAL

## 2025-05-29 ENCOUNTER — OFFICE VISIT (OUTPATIENT)
Dept: GASTROENTEROLOGY | Facility: CLINIC | Age: 40
End: 2025-05-29
Payer: COMMERCIAL

## 2025-05-29 ENCOUNTER — TELEPHONE (OUTPATIENT)
Dept: ENDOSCOPY | Facility: HOSPITAL | Age: 40
End: 2025-05-29
Payer: COMMERCIAL

## 2025-05-29 VITALS
SYSTOLIC BLOOD PRESSURE: 116 MMHG | WEIGHT: 187.63 LBS | HEART RATE: 82 BPM | HEIGHT: 66 IN | BODY MASS INDEX: 30.16 KG/M2 | DIASTOLIC BLOOD PRESSURE: 76 MMHG

## 2025-05-29 VITALS — BODY MASS INDEX: 30.05 KG/M2 | HEIGHT: 66 IN | WEIGHT: 187 LBS

## 2025-05-29 DIAGNOSIS — K21.9 GASTROESOPHAGEAL REFLUX DISEASE, UNSPECIFIED WHETHER ESOPHAGITIS PRESENT: ICD-10-CM

## 2025-05-29 DIAGNOSIS — R11.0 NAUSEA: ICD-10-CM

## 2025-05-29 DIAGNOSIS — R10.31 ABDOMINAL PAIN, RLQ: Primary | ICD-10-CM

## 2025-05-29 DIAGNOSIS — R10.9 ABDOMINAL PAIN, UNSPECIFIED ABDOMINAL LOCATION: ICD-10-CM

## 2025-05-29 DIAGNOSIS — R19.7 DIARRHEA, UNSPECIFIED TYPE: Primary | ICD-10-CM

## 2025-05-29 DIAGNOSIS — K58.0 IRRITABLE BOWEL SYNDROME WITH DIARRHEA: ICD-10-CM

## 2025-05-29 DIAGNOSIS — R10.31 ABDOMINAL PAIN, RLQ: ICD-10-CM

## 2025-05-29 LAB — IGA SERPL-MCNC: 93 MG/DL (ref 40–350)

## 2025-05-29 PROCEDURE — 1160F RVW MEDS BY RX/DR IN RCRD: CPT | Mod: CPTII,S$GLB,,

## 2025-05-29 PROCEDURE — 86677 HELICOBACTER PYLORI ANTIBODY: CPT

## 2025-05-29 PROCEDURE — 86364 TISS TRNSGLTMNASE EA IG CLAS: CPT

## 2025-05-29 PROCEDURE — 82784 ASSAY IGA/IGD/IGG/IGM EACH: CPT

## 2025-05-29 PROCEDURE — 99204 OFFICE O/P NEW MOD 45 MIN: CPT | Mod: S$GLB,,,

## 2025-05-29 PROCEDURE — 3008F BODY MASS INDEX DOCD: CPT | Mod: CPTII,S$GLB,,

## 2025-05-29 PROCEDURE — 3078F DIAST BP <80 MM HG: CPT | Mod: CPTII,S$GLB,,

## 2025-05-29 PROCEDURE — 99999 PR PBB SHADOW E&M-EST. PATIENT-LVL IV: CPT | Mod: PBBFAC,,,

## 2025-05-29 PROCEDURE — 1159F MED LIST DOCD IN RCRD: CPT | Mod: CPTII,S$GLB,,

## 2025-05-29 PROCEDURE — 3074F SYST BP LT 130 MM HG: CPT | Mod: CPTII,S$GLB,,

## 2025-05-29 PROCEDURE — 3044F HG A1C LEVEL LT 7.0%: CPT | Mod: CPTII,S$GLB,,

## 2025-05-29 PROCEDURE — 36415 COLL VENOUS BLD VENIPUNCTURE: CPT

## 2025-05-29 RX ORDER — DICYCLOMINE HYDROCHLORIDE 10 MG/1
10 CAPSULE ORAL 3 TIMES DAILY PRN
Qty: 30 CAPSULE | Refills: 0 | Status: SHIPPED | OUTPATIENT
Start: 2025-05-29

## 2025-05-29 NOTE — TELEPHONE ENCOUNTER
"----- Message from GENARO Walton sent at 5/29/2025  1:27 PM CDT -----  Regarding: EGD/colonoscopy  Procedure: EGD/ColonoscopyDiagnosis: Diarrhea, Abdominal pain, and GERDProcedure Timing: Within 12 weeks#If within 4 weeks selected, please jamil as high priority##If greater than 12 weeks, please select "5-12 weeks" and delay sending until 3 months prior to requested date# Location: Any SiteAdditional Scheduling Information: No scheduling concernsPrep Specifications:Standard prepIs the patient taking a GLP-1 Agonist:noHave you attached a patient to this message: yes  "

## 2025-05-29 NOTE — PROGRESS NOTES
"  Abdominal Pain  How Long: Late April    Frequency: Daily - varying quality of pain    Location: RLQ    Quality: Cramping, churning --> now Dull, bloating   Radiate: Back right side   Aggravated or Improved with bowel movement  /eating/ movement Eating worse dean dairy fatty greasy beef, BM better    Medications tried:  Heating pad    Nausea/Vomiting/Unexplained Weight Loss: nausea   Pyrosis/Reflux/Dysphagia: Reflux - esophagitis in past chocolate trigger    Bowel Movements: Daily or twice - 3 or 4 BSC certain foods diarrhea    Melena/Hematochezia: No     Symptoms: Not currently UTI once before    GLP-1s:  No    NSAIDs:  Rarely    Anticoagulation or Antiplatelet: No    History of H.pylori: No    Prior Colonoscopy: no   Prior Upper Endoscopy: No    Family h/o Crohn's  no   Ulcerative Colitis: no   Family h/o Celiac Sprue: no   Family h/o Colon Cancer:     no   Abdominal Surgeries: Lap salping       Gastroenterology Clinic Consultation Note    Patient ID: Daniela Dillon is a 40 y.o. female.    Chief Complaint: GI Problem (Gallbladder concerns)    History of Present Illness    CHIEF COMPLAINT:  Patient presents today for abdominal pain and digestive issues.    HISTORY OF PRESENT ILLNESS:  She reports abdominal pain that started in late April, initially presenting as rolling abdominal pains described as feeling like her insides were being "sliced with a hot knife." The pain now radiates to the right lower side. She experiences constant discomfort and bloating on the right side of her abdomen, distinct from acute pain episodes. Pain is triggered by certain foods, particularly greasy or high-fat content items. After consuming trigger foods, pain duration varies from 20 minutes to several hours, with severe episodes lasting days after eating beef. Pain sometimes improves after bowel movements. She denies vomiting and reports symptom improvement with avoidance of spicy, greasy, and fatty foods.  Recent ultrasound " showed gallbladder polyp PCP to follow-up with another ultrasound in 6 months.    GASTROINTESTINAL:  She has regular bowel movements twice daily, typically morning and afternoon, with normal consistency. She experiences diarrhea with consumption of red meat and oil-heavy or fried foods. She denies blood in stool or black stools. She has lactose sensitivity since childhood.    MEDICAL HISTORY:  She has a history of acid reflux diagnosed in her 20s resulting in esophageal perforation requiring ENT evaluation in [place]. She had a kidney infection with hematuria approximately 1-2 years ago.    SURGICAL HISTORY:  She underwent bilateral salpingectomy in November 2023.    WEIGHT MANAGEMENT:  She reports a 40-pound weight gain since moving to the area less than 4 years ago. She previously maintained weight through working with a  and following a structured diet plan but discontinued due to financial constraints. She currently eats without dietary restrictions.      ROS:  General: -fever, -chills, -fatigue, -weight gain, -weight loss  Eyes: -vision changes, -redness, -discharge  ENT: -ear pain, -nasal congestion, -sore throat  Cardiovascular: -chest pain, -palpitations, -lower extremity edema  Respiratory: +cough, -shortness of breath, +difficulty breathing  Gastrointestinal: +abdominal pain, +nausea, -vomiting, +diarrhea, -constipation, -blood in stool, +bloating, +indigestion  Genitourinary: -dysuria, -hematuria, -frequency, +excessive urination  Musculoskeletal: -joint pain, -muscle pain, +back pain  Skin: -rash, -lesion  Neurological: -headache, -dizziness, -numbness, -tingling  Psychiatric: -anxiety, -depression, -sleep difficulty       Physical Exam  Vitals and nursing note reviewed.   Constitutional:       General: She is not in acute distress.     Appearance: Normal appearance. She is not ill-appearing.   HENT:      Head: Normocephalic and atraumatic.      Right Ear: External ear normal.      Left  Ear: External ear normal.      Nose: Nose normal.   Eyes:      General: No scleral icterus.     Extraocular Movements: Extraocular movements intact.   Cardiovascular:      Rate and Rhythm: Normal rate.   Pulmonary:      Effort: Pulmonary effort is normal. No respiratory distress.   Abdominal:      General: There is no distension.      Palpations: Abdomen is soft.      Tenderness: There is no guarding.   Musculoskeletal:         General: Normal range of motion.      Cervical back: Normal range of motion.   Skin:     General: Skin is warm.   Neurological:      Mental Status: She is alert and oriented to person, place, and time.   Psychiatric:         Mood and Affect: Mood normal.         Behavior: Behavior is cooperative.         Thought Content: Thought content normal.             Medical History:  has a past medical history of Asthma.    Surgical History:  has a past surgical history that includes Dilation and curettage of uterus; Thermal ablation of endometrium using hysteroscopy (N/A, 11/14/2023); and Laparoscopic salpingectomy (Bilateral, 11/14/2023).    Family History: family history includes Alcohol abuse in her father; Drug abuse in her father; Endocrine tumor in her sister; Fibroids in her daughter; Hodgkin's lymphoma in her mother; Hypertension in her father; Lung cancer in her paternal grandmother; No Known Problems in her son; Stroke in her father..       Review of patient's allergies indicates:   Allergen Reactions    Grass pollen-manny grass standard Hives     ITCHING, RUNNY NOSE    Mycobacterium tuberculosis (tuberculin ppd) Rash       Medications Ordered Prior to Encounter[1]    Labs:  Lab Results   Component Value Date    WBC 5.53 04/07/2025    HGB 12.2 04/07/2025    HCT 38.7 04/07/2025     04/07/2025    CHOL 169 04/07/2025    TRIG 73 04/07/2025    HDL 40 04/07/2025    ALKPHOS 74 04/07/2025    ALT 17 04/07/2025    AST 21 04/07/2025     04/07/2025    K 4.4 04/07/2025     04/07/2025     "CREATININE 0.8 04/07/2025    BUN 8 04/07/2025    CO2 22 (L) 04/07/2025    TSH 2.673 04/07/2025    HGBA1C 5.6 04/07/2025       Vital Signs:  /76   Pulse 82   Ht 5' 6" (1.676 m)   Wt 85.1 kg (187 lb 9.8 oz)   BMI 30.28 kg/m²   Body mass index is 30.28 kg/m².    Imaging reviewed:   US ABDOMEN LIMITED     CLINICAL HISTORY:  Right lower quadrant pain     TECHNIQUE:  Limited ultrasound of the right upper quadrant of the abdomen (including pancreas, liver, gallbladder, common bile duct) was performed.     COMPARISON:  No comparison     FINDINGS:  Visualized pancreas is within normal limits.     The abdominal aorta is not aneurysmal.  Visualized inferior vena cava is within normal limits.     The gallbladder demonstrates a 5 mm nonmobile echogenic focus along the wall.  The common duct is not dilated, 3 mm.     The liver measures 16.6 cm, not enlarged.  The liver demonstrates no focal abnormality.     The spleen is not enlarged, 10 cm.     Impression:     5 mm gallbladder polyp versus adherent stone.  No mobile gallstones demonstrated.  No tenderness noted while scanning over the gallbladder.        Electronically signed by:Nicolasa Mcdaniel MD  Date:                                            04/28/2025    US PELVIS COMP WITH TRANSVAG NON-OB (XPD)     CLINICAL HISTORY:  Abnormal uterine and vaginal bleeding, unspecified     TECHNIQUE:  Transabdominal sonography of the pelvis was performed, followed by transvaginal sonography to better evaluate the uterus and ovaries.     COMPARISON:  Ultrasound from 10/31/2023     FINDINGS:  Uterus:     Size: 7.3 x 3.6 x 4.7 cm     Masses: None     Endometrium: Normal thickness in this pre menopausal patient, measuring 2 mm.  Fluid within the endometrial canal.     Right ovary:     Size: 2.7 x 1.5 x 3.1 cm     Appearance: Normal     Vascular flow: Normal.     Left ovary:     Size: 2.9 x 1.8 x 3.2 cm     Appearance: Simple appearing follicle measuring 1.8 x 2.4 x 1.6 cm.   "   Vascular Flow: Normal.     Free Fluid:     Small amount of free fluid in the cul-de-sac and adjacent to the left ovary.     Impression:     Fluid within the endometrial canal, likely hemorrhagic products.        Electronically signed by:Cory Rocha MD  Date:                                            04/17/2025      Endoscopy reviewed: NA       Assessment:  1. Nausea    2. Abdominal pain, RLQ    3. Gastroesophageal reflux disease, unspecified whether esophagitis present    4. Irritable bowel syndrome with diarrhea      Orders Placed This Encounter    Tissue transglutaminase, IgA    IgA    H. PYLORI ANTIBODY, IGG    dicyclomine (BENTYL) 10 MG capsule       Assessment & Plan        IRRITABLE BOWEL SYNDROME:  - Suspect IBS based on reported symptoms and food sensitivities.  - Explained IBS can be exacerbated by certain foods and anxiety.  - Discussed common food sensitivities in IBS, including dairy and gluten.  - Patient to be mindful of food triggers for IBS symptoms, particularly dairy and high-fat foods.  - Started Bentyl (dicyclomine) as needed, up to 3 times daily for abdominal pain, cramping, discomfort, and diarrhea.    GALLBLADDER DISEASE:  - Considered gallbladder issues due to US findings of 5 mm polyp.  - Monitoring gallbladder polyp size; surgery may be indicated if symptoms worsen.  - Informed about the need for serial imaging to monitor gallbladder polyp growth.  - Educated on symptoms that would warrant more immediate gallbladder removal (e.g., nausea, vomiting, chills).  - Patient to avoid spicy, greasy, and fatty foods to prevent gallbladder irritation.    DIAGNOSTIC TESTS:  - Ordered labs for Celiac disease and H. pylori to rule out other causes.  - Ordered upper and lower endoscopies with biopsies to further evaluate GI symptoms and abdominal pain.    FOLLOW-UP:  - Follow up when test results are available.          GENARO CARO  Gastroenterology Department  [facility] [phone]     This note was generated with the assistance of ambient listening technology. Verbal consent was obtained by the patient and accompanying visitor(s) for the recording of patient appointment to facilitate this note. I attest to having reviewed and edited the generated note for accuracy, though some syntax or spelling errors may persist. Please contact the author of this note for any clarification.                      [1]   Current Outpatient Medications on File Prior to Visit   Medication Sig Dispense Refill    ADVAIR -21 mcg/actuation HFAA inhaler Inhale 2 puffs into the lungs 2 (two) times daily. 12 g 3    albuterol (PROVENTIL/VENTOLIN HFA) 90 mcg/actuation inhaler Inhale 2 puffs into the lungs every 6 (six) hours as needed. 18 g 3    cetirizine (ZYRTEC) 10 MG tablet Take 10 mg by mouth once daily.      fluticasone propionate (FLONASE) 50 mcg/actuation nasal spray 1 spray by Nasal route.      ketoconazole (NIZORAL) 2 % cream Apply topically once daily. 30 g 0    montelukast (SINGULAIR) 10 mg tablet Take 10 mg by mouth every evening.      ruxolitinib 1.5 % Crea Apply to hypopigmented spots daily 60 g 5     Current Facility-Administered Medications on File Prior to Visit   Medication Dose Route Frequency Provider Last Rate Last Admin    triamcinolone acetonide injection 10 mg  10 mg Intradermal 1 time in Clinic/HOD

## 2025-05-29 NOTE — TELEPHONE ENCOUNTER
Patient is scheduled for a Colonoscopy/EGD on 6/6/25 with Dr. LAITH Denny  Referral for procedure from [facility]

## 2025-05-29 NOTE — H&P (VIEW-ONLY)
"  Abdominal Pain  How Long: Late April    Frequency: Daily - varying quality of pain    Location: RLQ    Quality: Cramping, churning --> now Dull, bloating   Radiate: Back right side   Aggravated or Improved with bowel movement  /eating/ movement Eating worse dean dairy fatty greasy beef, BM better    Medications tried:  Heating pad    Nausea/Vomiting/Unexplained Weight Loss: nausea   Pyrosis/Reflux/Dysphagia: Reflux - esophagitis in past chocolate trigger    Bowel Movements: Daily or twice - 3 or 4 BSC certain foods diarrhea    Melena/Hematochezia: No     Symptoms: Not currently UTI once before    GLP-1s:  No    NSAIDs:  Rarely    Anticoagulation or Antiplatelet: No    History of H.pylori: No    Prior Colonoscopy: no   Prior Upper Endoscopy: No    Family h/o Crohn's  no   Ulcerative Colitis: no   Family h/o Celiac Sprue: no   Family h/o Colon Cancer:     no   Abdominal Surgeries: Lap salping       Gastroenterology Clinic Consultation Note    Patient ID: Daniela Dillon is a 40 y.o. female.    Chief Complaint: GI Problem (Gallbladder concerns)    History of Present Illness    CHIEF COMPLAINT:  Patient presents today for abdominal pain and digestive issues.    HISTORY OF PRESENT ILLNESS:  She reports abdominal pain that started in late April, initially presenting as rolling abdominal pains described as feeling like her insides were being "sliced with a hot knife." The pain now radiates to the right lower side. She experiences constant discomfort and bloating on the right side of her abdomen, distinct from acute pain episodes. Pain is triggered by certain foods, particularly greasy or high-fat content items. After consuming trigger foods, pain duration varies from 20 minutes to several hours, with severe episodes lasting days after eating beef. Pain sometimes improves after bowel movements. She denies vomiting and reports symptom improvement with avoidance of spicy, greasy, and fatty foods.  Recent ultrasound " showed gallbladder polyp PCP to follow-up with another ultrasound in 6 months.    GASTROINTESTINAL:  She has regular bowel movements twice daily, typically morning and afternoon, with normal consistency. She experiences diarrhea with consumption of red meat and oil-heavy or fried foods. She denies blood in stool or black stools. She has lactose sensitivity since childhood.    MEDICAL HISTORY:  She has a history of acid reflux diagnosed in her 20s resulting in esophageal perforation requiring ENT evaluation in [place]. She had a kidney infection with hematuria approximately 1-2 years ago.    SURGICAL HISTORY:  She underwent bilateral salpingectomy in November 2023.    WEIGHT MANAGEMENT:  She reports a 40-pound weight gain since moving to the area less than 4 years ago. She previously maintained weight through working with a  and following a structured diet plan but discontinued due to financial constraints. She currently eats without dietary restrictions.      ROS:  General: -fever, -chills, -fatigue, -weight gain, -weight loss  Eyes: -vision changes, -redness, -discharge  ENT: -ear pain, -nasal congestion, -sore throat  Cardiovascular: -chest pain, -palpitations, -lower extremity edema  Respiratory: +cough, -shortness of breath, +difficulty breathing  Gastrointestinal: +abdominal pain, +nausea, -vomiting, +diarrhea, -constipation, -blood in stool, +bloating, +indigestion  Genitourinary: -dysuria, -hematuria, -frequency, +excessive urination  Musculoskeletal: -joint pain, -muscle pain, +back pain  Skin: -rash, -lesion  Neurological: -headache, -dizziness, -numbness, -tingling  Psychiatric: -anxiety, -depression, -sleep difficulty       Physical Exam  Vitals and nursing note reviewed.   Constitutional:       General: She is not in acute distress.     Appearance: Normal appearance. She is not ill-appearing.   HENT:      Head: Normocephalic and atraumatic.      Right Ear: External ear normal.      Left  Ear: External ear normal.      Nose: Nose normal.   Eyes:      General: No scleral icterus.     Extraocular Movements: Extraocular movements intact.   Cardiovascular:      Rate and Rhythm: Normal rate.   Pulmonary:      Effort: Pulmonary effort is normal. No respiratory distress.   Abdominal:      General: There is no distension.      Palpations: Abdomen is soft.      Tenderness: There is no guarding.   Musculoskeletal:         General: Normal range of motion.      Cervical back: Normal range of motion.   Skin:     General: Skin is warm.   Neurological:      Mental Status: She is alert and oriented to person, place, and time.   Psychiatric:         Mood and Affect: Mood normal.         Behavior: Behavior is cooperative.         Thought Content: Thought content normal.             Medical History:  has a past medical history of Asthma.    Surgical History:  has a past surgical history that includes Dilation and curettage of uterus; Thermal ablation of endometrium using hysteroscopy (N/A, 11/14/2023); and Laparoscopic salpingectomy (Bilateral, 11/14/2023).    Family History: family history includes Alcohol abuse in her father; Drug abuse in her father; Endocrine tumor in her sister; Fibroids in her daughter; Hodgkin's lymphoma in her mother; Hypertension in her father; Lung cancer in her paternal grandmother; No Known Problems in her son; Stroke in her father..       Review of patient's allergies indicates:   Allergen Reactions    Grass pollen-manny grass standard Hives     ITCHING, RUNNY NOSE    Mycobacterium tuberculosis (tuberculin ppd) Rash       Medications Ordered Prior to Encounter[1]    Labs:  Lab Results   Component Value Date    WBC 5.53 04/07/2025    HGB 12.2 04/07/2025    HCT 38.7 04/07/2025     04/07/2025    CHOL 169 04/07/2025    TRIG 73 04/07/2025    HDL 40 04/07/2025    ALKPHOS 74 04/07/2025    ALT 17 04/07/2025    AST 21 04/07/2025     04/07/2025    K 4.4 04/07/2025     04/07/2025     "CREATININE 0.8 04/07/2025    BUN 8 04/07/2025    CO2 22 (L) 04/07/2025    TSH 2.673 04/07/2025    HGBA1C 5.6 04/07/2025       Vital Signs:  /76   Pulse 82   Ht 5' 6" (1.676 m)   Wt 85.1 kg (187 lb 9.8 oz)   BMI 30.28 kg/m²   Body mass index is 30.28 kg/m².    Imaging reviewed:   US ABDOMEN LIMITED     CLINICAL HISTORY:  Right lower quadrant pain     TECHNIQUE:  Limited ultrasound of the right upper quadrant of the abdomen (including pancreas, liver, gallbladder, common bile duct) was performed.     COMPARISON:  No comparison     FINDINGS:  Visualized pancreas is within normal limits.     The abdominal aorta is not aneurysmal.  Visualized inferior vena cava is within normal limits.     The gallbladder demonstrates a 5 mm nonmobile echogenic focus along the wall.  The common duct is not dilated, 3 mm.     The liver measures 16.6 cm, not enlarged.  The liver demonstrates no focal abnormality.     The spleen is not enlarged, 10 cm.     Impression:     5 mm gallbladder polyp versus adherent stone.  No mobile gallstones demonstrated.  No tenderness noted while scanning over the gallbladder.        Electronically signed by:Nicolasa Mcdaniel MD  Date:                                            04/28/2025    US PELVIS COMP WITH TRANSVAG NON-OB (XPD)     CLINICAL HISTORY:  Abnormal uterine and vaginal bleeding, unspecified     TECHNIQUE:  Transabdominal sonography of the pelvis was performed, followed by transvaginal sonography to better evaluate the uterus and ovaries.     COMPARISON:  Ultrasound from 10/31/2023     FINDINGS:  Uterus:     Size: 7.3 x 3.6 x 4.7 cm     Masses: None     Endometrium: Normal thickness in this pre menopausal patient, measuring 2 mm.  Fluid within the endometrial canal.     Right ovary:     Size: 2.7 x 1.5 x 3.1 cm     Appearance: Normal     Vascular flow: Normal.     Left ovary:     Size: 2.9 x 1.8 x 3.2 cm     Appearance: Simple appearing follicle measuring 1.8 x 2.4 x 1.6 cm.   "   Vascular Flow: Normal.     Free Fluid:     Small amount of free fluid in the cul-de-sac and adjacent to the left ovary.     Impression:     Fluid within the endometrial canal, likely hemorrhagic products.        Electronically signed by:Cory Rocha MD  Date:                                            04/17/2025      Endoscopy reviewed: NA       Assessment:  1. Nausea    2. Abdominal pain, RLQ    3. Gastroesophageal reflux disease, unspecified whether esophagitis present    4. Irritable bowel syndrome with diarrhea      Orders Placed This Encounter    Tissue transglutaminase, IgA    IgA    H. PYLORI ANTIBODY, IGG    dicyclomine (BENTYL) 10 MG capsule       Assessment & Plan        IRRITABLE BOWEL SYNDROME:  - Suspect IBS based on reported symptoms and food sensitivities.  - Explained IBS can be exacerbated by certain foods and anxiety.  - Discussed common food sensitivities in IBS, including dairy and gluten.  - Patient to be mindful of food triggers for IBS symptoms, particularly dairy and high-fat foods.  - Started Bentyl (dicyclomine) as needed, up to 3 times daily for abdominal pain, cramping, discomfort, and diarrhea.    GALLBLADDER DISEASE:  - Considered gallbladder issues due to US findings of 5 mm polyp.  - Monitoring gallbladder polyp size; surgery may be indicated if symptoms worsen.  - Informed about the need for serial imaging to monitor gallbladder polyp growth.  - Educated on symptoms that would warrant more immediate gallbladder removal (e.g., nausea, vomiting, chills).  - Patient to avoid spicy, greasy, and fatty foods to prevent gallbladder irritation.    DIAGNOSTIC TESTS:  - Ordered labs for Celiac disease and H. pylori to rule out other causes.  - Ordered upper and lower endoscopies with biopsies to further evaluate GI symptoms and abdominal pain.    FOLLOW-UP:  - Follow up when test results are available.          GENARO CARO  Gastroenterology Department  [facility] [phone]     This note was generated with the assistance of ambient listening technology. Verbal consent was obtained by the patient and accompanying visitor(s) for the recording of patient appointment to facilitate this note. I attest to having reviewed and edited the generated note for accuracy, though some syntax or spelling errors may persist. Please contact the author of this note for any clarification.                      [1]   Current Outpatient Medications on File Prior to Visit   Medication Sig Dispense Refill    ADVAIR -21 mcg/actuation HFAA inhaler Inhale 2 puffs into the lungs 2 (two) times daily. 12 g 3    albuterol (PROVENTIL/VENTOLIN HFA) 90 mcg/actuation inhaler Inhale 2 puffs into the lungs every 6 (six) hours as needed. 18 g 3    cetirizine (ZYRTEC) 10 MG tablet Take 10 mg by mouth once daily.      fluticasone propionate (FLONASE) 50 mcg/actuation nasal spray 1 spray by Nasal route.      ketoconazole (NIZORAL) 2 % cream Apply topically once daily. 30 g 0    montelukast (SINGULAIR) 10 mg tablet Take 10 mg by mouth every evening.      ruxolitinib 1.5 % Crea Apply to hypopigmented spots daily 60 g 5     Current Facility-Administered Medications on File Prior to Visit   Medication Dose Route Frequency Provider Last Rate Last Admin    triamcinolone acetonide injection 10 mg  10 mg Intradermal 1 time in Clinic/HOD

## 2025-05-30 LAB — H PYLORI IGG SERPL QL IA: NEGATIVE

## 2025-06-02 ENCOUNTER — TELEPHONE (OUTPATIENT)
Dept: ENDOSCOPY | Facility: HOSPITAL | Age: 40
End: 2025-06-02
Payer: COMMERCIAL

## 2025-06-02 DIAGNOSIS — K21.9 GASTROESOPHAGEAL REFLUX DISEASE, UNSPECIFIED WHETHER ESOPHAGITIS PRESENT: ICD-10-CM

## 2025-06-02 DIAGNOSIS — R10.31 ABDOMINAL PAIN, RLQ: ICD-10-CM

## 2025-06-02 DIAGNOSIS — Z12.11 COLON CANCER SCREENING: Primary | ICD-10-CM

## 2025-06-02 LAB — W TISSUE TRANSGLUTAMINASE IGA AB: <0.2 U/ML

## 2025-06-02 RX ORDER — SODIUM, POTASSIUM,MAG SULFATES 17.5-3.13G
1 SOLUTION, RECONSTITUTED, ORAL ORAL DAILY
Qty: 1 KIT | Refills: 0 | Status: SHIPPED | OUTPATIENT
Start: 2025-06-02 | End: 2025-06-04

## 2025-06-03 ENCOUNTER — PATIENT MESSAGE (OUTPATIENT)
Dept: BEHAVIORAL HEALTH | Facility: CLINIC | Age: 40
End: 2025-06-03
Payer: COMMERCIAL

## 2025-06-05 ENCOUNTER — OFFICE VISIT (OUTPATIENT)
Dept: BEHAVIORAL HEALTH | Facility: CLINIC | Age: 40
End: 2025-06-05
Payer: COMMERCIAL

## 2025-06-05 DIAGNOSIS — F41.1 GENERALIZED ANXIETY DISORDER: Primary | ICD-10-CM

## 2025-06-06 ENCOUNTER — ANESTHESIA EVENT (OUTPATIENT)
Dept: ENDOSCOPY | Facility: HOSPITAL | Age: 40
End: 2025-06-06
Payer: COMMERCIAL

## 2025-06-06 ENCOUNTER — HOSPITAL ENCOUNTER (OUTPATIENT)
Facility: HOSPITAL | Age: 40
Discharge: HOME | End: 2025-06-06
Attending: INTERNAL MEDICINE | Admitting: INTERNAL MEDICINE
Payer: COMMERCIAL

## 2025-06-06 ENCOUNTER — RESULTS FOLLOW-UP (OUTPATIENT)
Dept: FAMILY MEDICINE | Facility: CLINIC | Age: 40
End: 2025-06-06

## 2025-06-06 ENCOUNTER — ANESTHESIA (OUTPATIENT)
Dept: ENDOSCOPY | Facility: HOSPITAL | Age: 40
End: 2025-06-06
Payer: COMMERCIAL

## 2025-06-06 VITALS
BODY MASS INDEX: 29.87 KG/M2 | OXYGEN SATURATION: 98 % | RESPIRATION RATE: 16 BRPM | SYSTOLIC BLOOD PRESSURE: 115 MMHG | DIASTOLIC BLOOD PRESSURE: 66 MMHG | HEIGHT: 66 IN | TEMPERATURE: 98 F | HEART RATE: 80 BPM | WEIGHT: 185.88 LBS

## 2025-06-06 DIAGNOSIS — K21.9 GASTROESOPHAGEAL REFLUX DISEASE, UNSPECIFIED WHETHER ESOPHAGITIS PRESENT: Primary | ICD-10-CM

## 2025-06-06 DIAGNOSIS — R10.13 EPIGASTRIC ABDOMINAL PAIN: ICD-10-CM

## 2025-06-06 DIAGNOSIS — R10.9 ABDOMINAL PAIN, UNSPECIFIED ABDOMINAL LOCATION: ICD-10-CM

## 2025-06-06 DIAGNOSIS — R19.7 DIARRHEA, UNSPECIFIED TYPE: ICD-10-CM

## 2025-06-06 LAB
B-HCG UR QL: NEGATIVE
CTP QC/QA: YES

## 2025-06-06 PROCEDURE — 81025 URINE PREGNANCY TEST: CPT | Performed by: INTERNAL MEDICINE

## 2025-06-06 PROCEDURE — 37000009 HC ANESTHESIA EA ADD 15 MINS: Performed by: INTERNAL MEDICINE

## 2025-06-06 PROCEDURE — 27201012 HC FORCEPS, HOT/COLD, DISP: Performed by: INTERNAL MEDICINE

## 2025-06-06 PROCEDURE — 25000003 PHARM REV CODE 250: Performed by: INTERNAL MEDICINE

## 2025-06-06 PROCEDURE — 43239 EGD BIOPSY SINGLE/MULTIPLE: CPT | Mod: 51,,, | Performed by: INTERNAL MEDICINE

## 2025-06-06 PROCEDURE — 82657 ENZYME CELL ACTIVITY: CPT | Performed by: INTERNAL MEDICINE

## 2025-06-06 PROCEDURE — 43239 EGD BIOPSY SINGLE/MULTIPLE: CPT | Performed by: INTERNAL MEDICINE

## 2025-06-06 PROCEDURE — 45378 DIAGNOSTIC COLONOSCOPY: CPT | Performed by: INTERNAL MEDICINE

## 2025-06-06 PROCEDURE — 25000003 PHARM REV CODE 250

## 2025-06-06 PROCEDURE — 45378 DIAGNOSTIC COLONOSCOPY: CPT | Mod: ,,, | Performed by: INTERNAL MEDICINE

## 2025-06-06 PROCEDURE — 37000008 HC ANESTHESIA 1ST 15 MINUTES: Performed by: INTERNAL MEDICINE

## 2025-06-06 PROCEDURE — 63600175 PHARM REV CODE 636 W HCPCS

## 2025-06-06 RX ORDER — LIDOCAINE HYDROCHLORIDE 20 MG/ML
INJECTION, SOLUTION EPIDURAL; INFILTRATION; INTRACAUDAL; PERINEURAL
Status: DISCONTINUED | OUTPATIENT
Start: 2025-06-06 | End: 2025-06-06

## 2025-06-06 RX ORDER — SODIUM CHLORIDE 9 MG/ML
INJECTION, SOLUTION INTRAVENOUS CONTINUOUS
Status: DISCONTINUED | OUTPATIENT
Start: 2025-06-06 | End: 2025-06-06 | Stop reason: HOSPADM

## 2025-06-06 RX ORDER — PROPOFOL 10 MG/ML
VIAL (ML) INTRAVENOUS
Status: DISCONTINUED | OUTPATIENT
Start: 2025-06-06 | End: 2025-06-06

## 2025-06-06 RX ORDER — DEXMEDETOMIDINE HYDROCHLORIDE 100 UG/ML
INJECTION, SOLUTION INTRAVENOUS
Status: DISCONTINUED | OUTPATIENT
Start: 2025-06-06 | End: 2025-06-06

## 2025-06-06 RX ADMIN — SODIUM CHLORIDE: 0.9 INJECTION, SOLUTION INTRAVENOUS at 08:06

## 2025-06-06 RX ADMIN — PROPOFOL 25 MG: 10 INJECTION, EMULSION INTRAVENOUS at 08:06

## 2025-06-06 RX ADMIN — PROPOFOL 200 MCG/KG/MIN: 10 INJECTION, EMULSION INTRAVENOUS at 08:06

## 2025-06-06 RX ADMIN — PROPOFOL 30 MG: 10 INJECTION, EMULSION INTRAVENOUS at 08:06

## 2025-06-06 RX ADMIN — LIDOCAINE HYDROCHLORIDE 100 MG: 20 INJECTION, SOLUTION EPIDURAL; INFILTRATION; INTRACAUDAL; PERINEURAL at 08:06

## 2025-06-06 RX ADMIN — PROPOFOL 70 MG: 10 INJECTION, EMULSION INTRAVENOUS at 08:06

## 2025-06-06 RX ADMIN — GLYCOPYRROLATE 0.2 MG: 0.2 INJECTION, SOLUTION INTRAMUSCULAR; INTRAVENOUS at 08:06

## 2025-06-06 RX ADMIN — DEXMEDETOMIDINE 6 MCG: 100 INJECTION, SOLUTION, CONCENTRATE INTRAVENOUS at 08:06

## 2025-06-06 NOTE — PROVATION PATIENT INSTRUCTIONS
Discharge Summary/Instructions after an Endoscopic Procedure  Patient Name: Daniela Dillon  Patient MRN: [id number]  Patient YOB: 1985 Friday, June 6, 2025  Yosef Denny MD  Dear patient,  As a result of recent federal legislation (The Federal Cures Act), you may   receive lab or pathology results from your procedure in your [facility]   account before your physician is able to contact you. Your physician or   their representative will relay the results to you with their   recommendations at their soonest availability.  Thank you,  RESTRICTIONS:  During your procedure today, you received medications for sedation.  These   medications may affect your judgment, balance and coordination.  Therefore,   for 24 hours, you have the following restrictions:   - DO NOT drive a car, operate machinery, make legal/financial decisions,   sign important papers or drink alcohol.    ACTIVITY:  Today: no heavy lifting, straining or running due to procedural   sedation/anesthesia.  The following day: return to full activity including work.  DIET:  Eat and drink normally unless instructed otherwise.     TREATMENT FOR COMMON SIDE EFFECTS:  - Mild abdominal pain, nausea, belching, bloating or excessive gas:  rest,   eat lightly and use a heating pad.  - Sore Throat: treat with throat lozenges and/or gargle with warm salt   water.  - Because air was used during the procedure, expelling large amounts of air   from your rectum or belching is normal.  - If a bowel prep was taken, you may not have a bowel movement for 1-3 days.    This is normal.  SYMPTOMS TO WATCH FOR AND REPORT TO YOUR PHYSICIAN:  1. Abdominal pain or bloating, other than gas cramps.  2. Chest pain.  3. Back pain.  4. Signs of infection such as: chills or fever occurring within 24 hours   after the procedure.  5. Rectal bleeding, which would show as bright red, maroon, or black stools.   (A tablespoon of blood from the rectum is not serious, especially if    hemorrhoids are present.)  6. Vomiting.  7. Weakness or dizziness.  GO DIRECTLY TO THE NEAREST EMERGENCY ROOM IF YOU HAVE ANY OF THE FOLLOWING:      Difficulty breathing              Chills and/or fever over 101 F   Persistent vomiting and/or vomiting blood   Severe abdominal pain   Severe chest pain   Black, tarry stools   Bleeding- more than one tablespoon   Any other symptom or condition that you feel may need urgent attention  Your doctor recommends these additional instructions:  If any biopsies were taken, your doctors clinic will contact you in 1 to 2   weeks with any results.  - Discharge patient to home.   - Repeat colonoscopy in 3 years for screening purposes.   - The findings and recommendations were discussed with the designated   responsible adult.  For questions, problems or results please call your physician - Yosef Denny MD at Work:  [phone].  [facility], EMERGENCY ROOM PHONE NUMBER: [phone]  IF A COMPLICATION OR EMERGENCY SITUATION ARISES AND YOU ARE UNABLE TO REACH   YOUR PHYSICIAN - GO DIRECTLY TO THE EMERGENCY ROOM.  Yosef Denny MD  6/6/2025 8:37:28 AM  This report has been verified and signed electronically.  Dear patient,  As a result of recent federal legislation (The Federal Cures Act), you may   receive lab or pathology results from your procedure in your [facility]   account before your physician is able to contact you. Your physician or   their representative will relay the results to you with their   recommendations at their soonest availability.  Thank you,  PROVATION

## 2025-06-06 NOTE — PROVATION PATIENT INSTRUCTIONS
Discharge Summary/Instructions after an Endoscopic Procedure  Patient Name: Daniela Dillon  Patient MRN: [id number]  Patient YOB: 1985 Friday, June 6, 2025  Yosfe Denny MD  Dear patient,  As a result of recent federal legislation (The Federal Cures Act), you may   receive lab or pathology results from your procedure in your [facility]   account before your physician is able to contact you. Your physician or   their representative will relay the results to you with their   recommendations at their soonest availability.  Thank you,  RESTRICTIONS:  During your procedure today, you received medications for sedation.  These   medications may affect your judgment, balance and coordination.  Therefore,   for 24 hours, you have the following restrictions:   - DO NOT drive a car, operate machinery, make legal/financial decisions,   sign important papers or drink alcohol.    ACTIVITY:  Today: no heavy lifting, straining or running due to procedural   sedation/anesthesia.  The following day: return to full activity including work.  DIET:  Eat and drink normally unless instructed otherwise.     TREATMENT FOR COMMON SIDE EFFECTS:  - Mild abdominal pain, nausea, belching, bloating or excessive gas:  rest,   eat lightly and use a heating pad.  - Sore Throat: treat with throat lozenges and/or gargle with warm salt   water.  - Because air was used during the procedure, expelling large amounts of air   from your rectum or belching is normal.  - If a bowel prep was taken, you may not have a bowel movement for 1-3 days.    This is normal.  SYMPTOMS TO WATCH FOR AND REPORT TO YOUR PHYSICIAN:  1. Abdominal pain or bloating, other than gas cramps.  2. Chest pain.  3. Back pain.  4. Signs of infection such as: chills or fever occurring within 24 hours   after the procedure.  5. Rectal bleeding, which would show as bright red, maroon, or black stools.   (A tablespoon of blood from the rectum is not serious, especially if    hemorrhoids are present.)  6. Vomiting.  7. Weakness or dizziness.  GO DIRECTLY TO THE NEAREST EMERGENCY ROOM IF YOU HAVE ANY OF THE FOLLOWING:      Difficulty breathing              Chills and/or fever over 101 F   Persistent vomiting and/or vomiting blood   Severe abdominal pain   Severe chest pain   Black, tarry stools   Bleeding- more than one tablespoon   Any other symptom or condition that you feel may need urgent attention  Your doctor recommends these additional instructions:  If any biopsies were taken, your doctors clinic will contact you in 1 to 2   weeks with any results.  - Discharge patient to home.   - Await pathology results.   - Perform a colonoscopy today.   - The findings and recommendations were discussed with the designated   responsible adult.  For questions, problems or results please call your physician - Yosef Denny MD at Work:  [phone].  [facility], EMERGENCY ROOM PHONE NUMBER: [phone]  IF A COMPLICATION OR EMERGENCY SITUATION ARISES AND YOU ARE UNABLE TO REACH   YOUR PHYSICIAN - GO DIRECTLY TO THE EMERGENCY ROOM.  Yosef Denny MD  6/6/2025 8:23:04 AM  This report has been verified and signed electronically.  Dear patient,  As a result of recent federal legislation (The Federal Cures Act), you may   receive lab or pathology results from your procedure in your [facility]   account before your physician is able to contact you. Your physician or   their representative will relay the results to you with their   recommendations at their soonest availability.  Thank you,  PROVATION

## 2025-06-10 ENCOUNTER — PATIENT MESSAGE (OUTPATIENT)
Dept: GASTROENTEROLOGY | Facility: CLINIC | Age: 40
End: 2025-06-10
Payer: COMMERCIAL

## 2025-06-10 LAB
ACID A-GLUCOSIDASE TSMI-CCNT: 119.1 NMOL/MIN/MG PROT
DISACCHARIDASES TSMI-IMP: ABNORMAL
GLUCAN 1,4-ALPHA-GLUCOSIDASE TSMI-CCNT: 15.1 NMOL/MIN/MG PROT
LACTASE TSMI-CCNT: 1.6 NMOL/MIN/MG PROT
PALATINASE TSMI-CCNT: 9.6 NMOL/MIN/MG PROT
PROVIDER SIGNING NAME: ABNORMAL
SUCRASE TSMI-CCNT: 35.4 NMOL/MIN/MG PROT

## 2025-06-26 ENCOUNTER — HOSPITAL ENCOUNTER (OUTPATIENT)
Dept: RADIOLOGY | Facility: HOSPITAL | Age: 40
Discharge: HOME | End: 2025-06-26
Attending: INTERNAL MEDICINE
Payer: COMMERCIAL

## 2025-06-26 DIAGNOSIS — Z12.31 ENCOUNTER FOR SCREENING MAMMOGRAM FOR BREAST CANCER: ICD-10-CM

## 2025-06-26 PROCEDURE — 77063 BREAST TOMOSYNTHESIS BI: CPT | Mod: TC

## 2025-07-03 ENCOUNTER — PATIENT MESSAGE (OUTPATIENT)
Dept: DERMATOLOGY | Facility: CLINIC | Age: 40
End: 2025-07-03
Payer: COMMERCIAL

## 2025-07-10 ENCOUNTER — PATIENT MESSAGE (OUTPATIENT)
Dept: FAMILY MEDICINE | Facility: CLINIC | Age: 40
End: 2025-07-10
Payer: COMMERCIAL

## 2025-07-14 ENCOUNTER — OFFICE VISIT (OUTPATIENT)
Dept: FAMILY MEDICINE | Facility: CLINIC | Age: 40
End: 2025-07-14
Payer: COMMERCIAL

## 2025-07-14 VITALS
DIASTOLIC BLOOD PRESSURE: 80 MMHG | BODY MASS INDEX: 30.18 KG/M2 | WEIGHT: 187.81 LBS | HEIGHT: 66 IN | TEMPERATURE: 99 F | HEART RATE: 81 BPM | SYSTOLIC BLOOD PRESSURE: 122 MMHG | RESPIRATION RATE: 16 BRPM | OXYGEN SATURATION: 99 %

## 2025-07-14 DIAGNOSIS — M54.2 NECK PAIN: ICD-10-CM

## 2025-07-14 DIAGNOSIS — R03.0 ELEVATED BLOOD PRESSURE READING: ICD-10-CM

## 2025-07-14 DIAGNOSIS — E66.09 CLASS 1 OBESITY DUE TO EXCESS CALORIES WITH SERIOUS COMORBIDITY AND BODY MASS INDEX (BMI) OF 31.0 TO 31.9 IN ADULT: ICD-10-CM

## 2025-07-14 DIAGNOSIS — E66.811 CLASS 1 OBESITY DUE TO EXCESS CALORIES WITH SERIOUS COMORBIDITY AND BODY MASS INDEX (BMI) OF 31.0 TO 31.9 IN ADULT: ICD-10-CM

## 2025-07-14 DIAGNOSIS — F43.23 ADJUSTMENT DISORDER WITH MIXED ANXIETY AND DEPRESSED MOOD: ICD-10-CM

## 2025-07-14 DIAGNOSIS — Z09 FOLLOW-UP EXAM: Primary | ICD-10-CM

## 2025-07-14 DIAGNOSIS — Z56.6 STRESS AT WORK: ICD-10-CM

## 2025-07-14 PROCEDURE — 1160F RVW MEDS BY RX/DR IN RCRD: CPT | Mod: CPTII,S$GLB,,

## 2025-07-14 PROCEDURE — 99214 OFFICE O/P EST MOD 30 MIN: CPT | Mod: S$GLB,,,

## 2025-07-14 PROCEDURE — 3008F BODY MASS INDEX DOCD: CPT | Mod: CPTII,S$GLB,,

## 2025-07-14 PROCEDURE — 99999 PR PBB SHADOW E&M-EST. PATIENT-LVL IV: CPT | Mod: PBBFAC,,,

## 2025-07-14 PROCEDURE — 1159F MED LIST DOCD IN RCRD: CPT | Mod: CPTII,S$GLB,,

## 2025-07-14 PROCEDURE — G2211 COMPLEX E/M VISIT ADD ON: HCPCS | Mod: S$GLB,,,

## 2025-07-14 PROCEDURE — 3074F SYST BP LT 130 MM HG: CPT | Mod: CPTII,S$GLB,,

## 2025-07-14 PROCEDURE — 3079F DIAST BP 80-89 MM HG: CPT | Mod: CPTII,S$GLB,,

## 2025-07-14 PROCEDURE — 3044F HG A1C LEVEL LT 7.0%: CPT | Mod: CPTII,S$GLB,,

## 2025-07-14 RX ORDER — METHOCARBAMOL 500 MG/1
500 TABLET, FILM COATED ORAL NIGHTLY PRN
Qty: 30 TABLET | Refills: 0 | Status: SHIPPED | OUTPATIENT
Start: 2025-07-14 | End: 2025-08-13

## 2025-07-14 RX ORDER — NAPROXEN 500 MG/1
500 TABLET ORAL 2 TIMES DAILY
Qty: 30 TABLET | Refills: 0 | Status: SHIPPED | OUTPATIENT
Start: 2025-07-14

## 2025-07-14 SDOH — SOCIAL DETERMINANTS OF HEALTH (SDOH): OTHER PHYSICAL AND MENTAL STRAIN RELATED TO WORK: Z56.6

## 2025-07-14 NOTE — PROGRESS NOTES
"  HPI     Chief Complaint:  Chief Complaint   Patient presents with    Follow-up       Daniela Dillon is a 40 y.o. 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 She plans to return to work next week but is uncertain about her long-term commitment to teaching. She describes her work environment as stressful, noting that multiple colleagues including her co-worker and an  have recently quit. She views nursing school as a reprieve from teaching and is considering a significant career change.    MUSCULOSKELETAL SYMPTOMS:  She reports ongoing right-sided musculoskeletal symptoms originating from neck, extending through hip and right foot, with a history of "COVID neck" from 2020. She currently experiences right-sided neck and arm pain with associated numbness and tingling. She describes muscle weakness and sensory changes, with pain exacerbated by movement and reduced range of motion, particularly with neck rotation. Symptoms were recently aggravated by gym activity and use of a massage gun. Pain is persistent with crunching sensations noted in the cervical spine. The numbness initially involved the entire arm but has since localized, with significant muscle soreness in the tricep region. Symptoms interfere with daily activities and range of motion. She denies similar episodes prior to 2020 COVID infection.    MEDICAL HISTORY:  She reports two COVID-19 infections in February 2020 while in [place], which resulted in severe pneumonia " "and a condition her physician referred to as "COVID neck". She has a known gallbladder polyp with a pending follow-up ultrasound to monitor its status. She was diagnosed with lactose intolerance following GI investigations including endoscopy and colonoscopy with biopsy.    WEIGHT MANAGEMENT:  She reports significant weight gain of 40 lbs since relocating from [place]. She attributes this to environmental factors including reduced physical activity and changes in diet. She previously had access to a  and healthy restaurants, which are no longer available in her current location.      ROS:  General: -fever, -chills, -fatigue, -weight gain, -weight loss  Eyes: -vision changes, -redness, -discharge  ENT: -ear pain, -nasal congestion, -sore throat  Cardiovascular: -chest pain, -palpitations, -lower extremity edema  Respiratory: -cough, -shortness of breath, +difficulty breathing  Gastrointestinal: +abdominal pain, -nausea, -vomiting, -diarrhea, -constipation, -blood in stool  Genitourinary: -dysuria, -hematuria, -frequency  Musculoskeletal: -joint pain, -muscle pain, +nerve pain, +neck pain  Skin: -rash, -lesion  Neurological: -headache, -dizziness, +numbness, +tingling  Psychiatric: +anxiety, -depression, -sleep difficulty             2/28/2025 Dr. Shepard    ACUTE STRESS:  - Assessed the patient's mental health status and work-related stress, determining the need for FMLA leave and short-term therapy.  - Noted the patient's reports of feeling angry, underappreciated, and unable to focus, experiencing high blood pressure and emotional distress related to work situation.  - Observed elevated heart rate at 130 bpm during evaluation.  - Recommend FMLA leave for 2-4 weeks to address acute stress and allow time for therapy initiation.  - Referred the patient to short-term therapy services for immediate mental health support.  - Instructed the patient to engage with short-term therapy services when contacted.  - " "Advised the patient to take time off work using FMLA leave to focus on mental health.  - Directed the patient to contact the office to submit FMLA paperwork for processing.      Assessment & Plan     Assessment & Plan    Assessed neck and arm pain, suspecting cervical radiculopathy based on symptoms and exam.    HISTORY OF COVID-19:  - Daniela contracted COVID-19 twice in 2020, with complications including pneumonia and ongoing symptoms affecting neck ("COVID neck") and asthma.  - Discussed management strategies for these residual effects and advised on continued symptom monitoring.         Problem List Items Addressed This Visit       Class 1 obesity due to excess calories with body mass index (BMI) of 31.0 to 31.9 in adult    Overview   Gained 40 lbs in past 2 years.    aware of need for weight loss.   ABNORMAL WEIGHT GAIN:  - Noted 40-pound weight increase since patient moved to [place].  - Attributed to lifestyle and environmental changes.  - Discussed and advised on implementing appropriate lifestyle modifications to address weight management.         Adjustment disorder with mixed anxiety and depressed mood  Patient is seeing counselor    Follow-up exam - Primary  TACHYCARDIA:  - Noted elevated pulse of 130 on February 28th.  - Discussed relationship between stress and elevated heart rate.  - Recommend stress management techniques and therapy as part of the approach to normalize heart rate.    ## GALLBLADDER POLYP:  - Scheduled abdominal ultrasound to monitor previously identified gallbladder polyp for any growth or movement.  - Will continue surveillance as indicated.    ## LACTOSE INTOLERANCE:  - Confirmed diagnosis following endoscopy and colonoscopy with biopsy.  - Discussed necessary dietary modifications, advising patient to avoid lactose-containing foods to manage GI symptoms.    Stress at work  STRESSFUL WORK SCHEDULE:  - Monitored patient's work-related stress which is contributing to hypertension " and emotional distress.  - Evaluated the connection between work environment and physical/emotional symptoms.  - Recommend FMLA leave for 2-4 weeks to address acute stress and allow time for therapy initiation, which may help manage both emotional symptoms and related physical conditions.    Elevated blood pressure reading  ESSENTIAL HYPERTENSION:  - Monitored blood pressure, which was 140/90 with pulse of 130 on February 28th, previously at hypertensive Stage II.  - Discussed the importance of stress management in controlling blood pressure.  - Advised patient to consider therapy as part of the comprehensive approach to hypertension management.    Neck pain   methocarbamoL (ROBAXIN) 500 MG Tab       naproxen (NAPROSYN) 500 MG tablet     CERVICAL RADICULOPATHY:  - Monitored nerve pain radiating from neck to hip and right foot, with associated tingling and muscle weakness in the arm.  - Ordered cervical spine x-ray to assess for potential disc issues or nerve impingement.  - Prescribed muscle relaxer for pain management and muscle cramps, with caution about potential drowsiness.  - Will consider further diagnostic testing such as nerve conduction studies or MRI if symptoms persist.    ## CERVICALGIA:  - Monitored neck pain with limited range of motion and muscle soreness.  - Management approach aligns with cervical radiculopathy treatment plan, including cervical spine x-ray and muscle relaxer prescription.  - Will reassess after initial interventions.    ## PARESTHESIA:  - Evaluated tingling and numbness in the arm indicating nerve involvement.  - This symptom appears related to the cervical issues and will be addressed through the same diagnostic and treatment approach outlined for cervical radiculopathy, including imaging and medication.         --------------------------------------------      Health Maintenance:  Health Maintenance         Date Due Completion Date    Influenza Vaccine (1) 09/01/2025 1/6/2025     "Mammogram 06/26/2026 6/26/2025    Cervical Cancer Screening 03/14/2028 3/14/2025    Hemoglobin A1c (Diabetic Prevention Screening) 04/07/2028 4/7/2025    TETANUS VACCINE 04/03/2034 4/3/2024    RSV Vaccine (Age 60+ and Pregnant patients) (1 - 1-dose 75+ series) 05/12/2058 ---            Health maintenance reviewed    Follow Up:  Follow up if symptoms worsen or fail to improve.    Exam     Review of Systems:  (as noted above)  Review of Systems    Physical Exam:   Physical Exam  Vitals:    07/14/25 0936   BP: 122/80   BP Location: Left arm   Patient Position: Sitting   Pulse: 81   Resp: 16   Temp: 98.5 °F (36.9 °C)   TempSrc: Oral   SpO2: 99%   Weight: 85.2 kg (187 lb 13.3 oz)   Height: 5' 6" (1.676 m)      Body mass index is 30.32 kg/m².    Physical Exam    Vitals: Blood pressure elevated to 140/90. Hypertensive. Pulse: 130.  General: No acute distress. Well-developed. Well-nourished.  Eyes: EOMI. Sclerae anicteric.  HENT: Normocephalic. Atraumatic. Nares patent.   Cardiovascular: Regular rate. Regular rhythm. No murmurs. No rubs. No gallops. Normal S1, S2.  Respiratory: Normal respiratory effort. Clear to auscultation bilaterally. No rales. No rhonchi. No wheezing.  Musculoskeletal: No  obvious deformity.  Extremities: No lower extremity edema.  Neurological: Alert & oriented x3. No slurred speech. Normal gait.  Psychiatric: Normal mood. Normal affect. Good insight. Good judgment.  Skin: Warm. Dry. No rash.           History     Past Medical History:  Past Medical History:   Diagnosis Date    Asthma        Past Surgical History:  Past Surgical History:   Procedure Laterality Date    COLONOSCOPY N/A 6/6/2025    Procedure: COLONOSCOPY;  Surgeon: Yosef Denny MD;  Location: [address];  Service: Endoscopy;  Laterality: N/A;    DILATION AND CURETTAGE OF UTERUS      2021 for polyp    ESOPHAGOGASTRODUODENOSCOPY N/A 6/6/2025    Procedure: EGD (ESOPHAGOGASTRODUODENOSCOPY);  Surgeon: Yosef Denny MD;  Location: [facility]" yes [facility] (4TH FLR);  Service: Endoscopy;  Laterality: N/A;  irasema/portal and given in the office/suprep/darcy  6/2: precall reviewed by JANICE  6/5 precall complete/pt confirmed on my chart on 6/4 and i called to verify/mleone    LAPAROSCOPIC SALPINGECTOMY Bilateral 11/14/2023    Procedure: SALPINGECTOMY, LAPAROSCOPIC;  Surgeon: Yaa Slaughter MD;  Location: [facility] OR;  Service: OB/GYN;  Laterality: Bilateral;    THERMAL ABLATION OF ENDOMETRIUM USING HYSTEROSCOPY N/A 11/14/2023    Procedure: ABLATION, ENDOMETRIUM, THERMAL, HYSTEROSCOPIC;  Surgeon: Yaa Slaughter MD;  Location: [facility] OR;  Service: OB/GYN;  Laterality: N/A;  RN PREOP 10/25/2023 -- TYPE & SCREEN/---done----- HCG 11/11/23 --<1.2,  HOLOGIC EFRAÍN VIMALRIT  [phone] NOTIFIED EFRAÍN ON 11/3/2023-LO       Social History:  Social History[1]    Family History:  Family History   Problem Relation Name Age of Onset    Hodgkin's lymphoma Mother      Stroke Father      Hypertension Father      Drug abuse Father      Alcohol abuse Father      Endocrine tumor Sister      Lung cancer Paternal Grandmother          smoker    Fibroids Daughter dani     No Known Problems Son bria        Allergies and Medications: (updated and reviewed)  Review of patient's allergies indicates:   Allergen Reactions    Grass pollen-june grass standard Hives     ITCHING, RUNNY NOSE    Mycobacterium tuberculosis (tuberculin ppd) Rash     Current Medications[2]    Patient Care Team:  Raquel Shepard MD as PCP - General (Internal Medicine)  Uzma Bowden MA as Care Coordinator         - The patient is given an After Visit Summary that lists all medications with directions, allergies, education, orders placed during this encounter and follow-up instructions.      - I have reviewed the patient's medical information including past medical, family, and social history sections including the medications and allergies.      - We discussed the patient's current medications.     This note was  created by combination of typed  and MModal dictation.  Transcription errors may be present.  If there are any questions, please contact me.     This note was generated with the assistance of ambient listening technology. Verbal consent was obtained by the patient and accompanying visitor(s) for the recording of patient appointment to facilitate this note. I attest to having reviewed and edited the generated note for accuracy, though some syntax or spelling errors may persist. Please contact the author of this note for any clarification.          Sharla Cunha PA-C                      [1]   Social History  Socioeconomic History    Marital status:    Tobacco Use    Smoking status: Never    Smokeless tobacco: Never   Substance and Sexual Activity    Alcohol use: Yes     Comment: social    Drug use: Yes     Types: Marijuana    Sexual activity: Yes     Partners: Male     Birth control/protection: None   Social History Narrative     since 2009    She is teacher at [employer].     Social Drivers of Health     Financial Resource Strain: Low Risk  (3/13/2025)    Overall Financial Resource Strain (CARDIA)     Difficulty of Paying Living Expenses: Not very hard   Food Insecurity: No Food Insecurity (3/13/2025)    Hunger Vital Sign     Worried About Running Out of Food in the Last Year: Never true     Ran Out of Food in the Last Year: Never true   Transportation Needs: No Transportation Needs (3/13/2025)    PRAPARE - Transportation     Lack of Transportation (Medical): No     Lack of Transportation (Non-Medical): No   Physical Activity: Sufficiently Active (3/13/2025)    Exercise Vital Sign     Days of Exercise per Week: 4 days     Minutes of Exercise per Session: 60 min   Stress: Stress Concern Present (3/13/2025)    [employer] - Occupational Stress Questionnaire     Feeling of Stress : Rather much   Housing Stability: Low Risk  (3/13/2025)    Housing  Stability Vital Sign     Unable to Pay for Housing in the Last Year: No     Number of Times Moved in the Last Year: 0     Homeless in the Last Year: No   [2]   Current Outpatient Medications   Medication Sig Dispense Refill    ADVAIR -21 mcg/actuation HFAA inhaler Inhale 2 puffs into the lungs 2 (two) times daily. 12 g 3    albuterol (PROVENTIL/VENTOLIN HFA) 90 mcg/actuation inhaler Inhale 2 puffs into the lungs every 6 (six) hours as needed. 18 g 3    cetirizine (ZYRTEC) 10 MG tablet Take 10 mg by mouth once daily.      fluticasone propionate (FLONASE) 50 mcg/actuation nasal spray 1 spray by Nasal route.      ruxolitinib 1.5 % Crea Apply to hypopigmented spots daily 60 g 5    dicyclomine (BENTYL) 10 MG capsule Take 1 capsule (10 mg total) by mouth 3 (three) times daily as needed (abdominal discomfort). (Patient not taking: Reported on 7/14/2025) 30 capsule 0    ketoconazole (NIZORAL) 2 % cream Apply topically once daily. (Patient not taking: Reported on 7/14/2025) 30 g 0    montelukast (SINGULAIR) 10 mg tablet Take 10 mg by mouth every evening. (Patient not taking: Reported on 7/14/2025)       Current Facility-Administered Medications   Medication Dose Route Frequency Provider Last Rate Last Admin    triamcinolone acetonide injection 10 mg  10 mg Intradermal 1 time in Clinic/HOD

## 2025-08-04 ENCOUNTER — PATIENT MESSAGE (OUTPATIENT)
Dept: FAMILY MEDICINE | Facility: CLINIC | Age: 40
End: 2025-08-04
Payer: COMMERCIAL

## 2025-08-04 DIAGNOSIS — M54.2 NECK PAIN: Primary | ICD-10-CM

## 2025-08-18 ENCOUNTER — OFFICE VISIT (OUTPATIENT)
Dept: PSYCHIATRY | Facility: CLINIC | Age: 40
End: 2025-08-18
Payer: COMMERCIAL

## 2025-08-18 VITALS
SYSTOLIC BLOOD PRESSURE: 115 MMHG | WEIGHT: 186.94 LBS | HEART RATE: 87 BPM | DIASTOLIC BLOOD PRESSURE: 70 MMHG | HEIGHT: 66 IN | TEMPERATURE: 98 F | BODY MASS INDEX: 30.04 KG/M2

## 2025-08-18 DIAGNOSIS — F41.1 GAD (GENERALIZED ANXIETY DISORDER): Primary | ICD-10-CM

## 2025-08-18 PROCEDURE — 90792 PSYCH DIAG EVAL W/MED SRVCS: CPT | Mod: S$GLB,,, | Performed by: PHYSICIAN ASSISTANT

## 2025-08-18 PROCEDURE — 99999 PR PBB SHADOW E&M-EST. PATIENT-LVL IV: CPT | Mod: PBBFAC,,, | Performed by: PHYSICIAN ASSISTANT

## (undated) DEVICE — SEE MEDLINE ITEM 146292

## (undated) DEVICE — SEE MEDLINE ITEM 154981

## (undated) DEVICE — TROCAR ENDOPATH XCEL 8MM 10CM

## (undated) DEVICE — PAD PINK TRENDELENBURG POS XL

## (undated) DEVICE — DRAPE STERI LONG

## (undated) DEVICE — BLANKET UPPER BODY 78.7X29.9IN

## (undated) DEVICE — CANISTER SUCTION 2 LTR

## (undated) DEVICE — Device

## (undated) DEVICE — PACK LAPAROSCOPY/PELVISCOPY II

## (undated) DEVICE — JELLY LUBRICANT STERILE 4 OZ

## (undated) DEVICE — SUT MONOCRYL 4.0 PS2 CP496G

## (undated) DEVICE — PAD SANITARY OB STERILE

## (undated) DEVICE — PAD PREP 50/CA

## (undated) DEVICE — ADHESIVE DERMABOND MINI HV

## (undated) DEVICE — GLOVE SURGICAL LATEX SZ 6

## (undated) DEVICE — KIT ANTIFOG

## (undated) DEVICE — UNDERGLOVE BIOGEL PI SZ 6.5 LF

## (undated) DEVICE — SOL NS 1000CC

## (undated) DEVICE — ELECTRODE REM PLYHSV RETURN 9

## (undated) DEVICE — POSITIONER HEAD ADULT

## (undated) DEVICE — TROCAR ENDOPATH XCEL 5X100MM

## (undated) DEVICE — MAT QUICK 40X30 FLOOR FLUID LF

## (undated) DEVICE — BLADE SURG CARBON STEEL SZ11

## (undated) DEVICE — APPLICATOR CHLORAPREP ORN 26ML

## (undated) DEVICE — KIT CUSTOM MINOR PROC ST

## (undated) DEVICE — COVER OVERHEAD SURG LT BLUE

## (undated) DEVICE — DRESSING TELFA N ADH 3X8

## (undated) DEVICE — SOL IRR SOD CHL .9% POUR

## (undated) DEVICE — SEE MEDLINE ITEM 157181

## (undated) DEVICE — CATH SELF-CATH FEMALE 14FR 6IN

## (undated) DEVICE — SOL NACL IRR 3000ML

## (undated) DEVICE — DEVICE ABLATION NOVASURE DISP

## (undated) DEVICE — NDL INSUF ULTRA VERESS 120MM

## (undated) DEVICE — SEALER LIGASURE LAP 37CM 5MM

## (undated) DEVICE — SYR 10CC LUER LOCK

## (undated) DEVICE — SEAL LENS SCOPE MYOSURE

## (undated) DEVICE — SEE MEDLINE ITEM 157110

## (undated) DEVICE — TOWEL OR DISP STRL BLUE 4/PK

## (undated) DEVICE — TUBING INSUFFLATION 10

## (undated) DEVICE — SEE MEDLINE ITEM 157150

## (undated) DEVICE — PACK FLUENT DISPOSABLE